# Patient Record
Sex: MALE | Race: BLACK OR AFRICAN AMERICAN | NOT HISPANIC OR LATINO | Employment: OTHER | URBAN - METROPOLITAN AREA
[De-identification: names, ages, dates, MRNs, and addresses within clinical notes are randomized per-mention and may not be internally consistent; named-entity substitution may affect disease eponyms.]

---

## 2021-09-03 ENCOUNTER — HOSPITAL ENCOUNTER (EMERGENCY)
Facility: HOSPITAL | Age: 76
Discharge: HOME | End: 2021-09-03
Attending: EMERGENCY MEDICINE
Payer: MEDICARE

## 2021-09-03 VITALS
RESPIRATION RATE: 18 BRPM | TEMPERATURE: 98 F | HEART RATE: 90 BPM | SYSTOLIC BLOOD PRESSURE: 132 MMHG | DIASTOLIC BLOOD PRESSURE: 66 MMHG | OXYGEN SATURATION: 96 %

## 2021-09-03 DIAGNOSIS — N30.00 ACUTE CYSTITIS WITHOUT HEMATURIA: Primary | ICD-10-CM

## 2021-09-03 DIAGNOSIS — J18.9 PNEUMONIA OF LEFT LOWER LOBE DUE TO INFECTIOUS ORGANISM: ICD-10-CM

## 2021-09-03 DIAGNOSIS — R00.0 TACHYCARDIA: ICD-10-CM

## 2021-09-03 DIAGNOSIS — I48.91 A-FIB: ICD-10-CM

## 2021-09-03 LAB
ALBUMIN SERPL BCP-MCNC: 4.2 G/DL (ref 3.5–5.2)
ALP SERPL-CCNC: 98 U/L (ref 38–126)
ALT SERPL W/O P-5'-P-CCNC: 11 U/L (ref 10–44)
ANION GAP SERPL CALC-SCNC: 13 MMOL/L (ref 8–16)
AST SERPL-CCNC: 49 U/L (ref 15–46)
BACTERIA #/AREA URNS AUTO: ABNORMAL /HPF
BASOPHILS # BLD AUTO: 0.05 K/UL (ref 0–0.2)
BASOPHILS NFR BLD: 0.3 % (ref 0–1.9)
BILIRUB SERPL-MCNC: 2.7 MG/DL (ref 0.1–1)
BILIRUB UR QL STRIP: ABNORMAL
CALCIUM SERPL-MCNC: 9.1 MG/DL (ref 8.7–10.5)
CHLORIDE SERPL-SCNC: 93 MMOL/L (ref 95–110)
CLARITY UR REFRACT.AUTO: ABNORMAL
CO2 SERPL-SCNC: 28 MMOL/L (ref 23–29)
COLOR UR AUTO: YELLOW
CREAT SERPL-MCNC: 1.6 MG/DL (ref 0.5–1.4)
DIFFERENTIAL METHOD: ABNORMAL
EOSINOPHIL # BLD AUTO: 0 K/UL (ref 0–0.5)
EOSINOPHIL NFR BLD: 0.1 % (ref 0–8)
ERYTHROCYTE [DISTWIDTH] IN BLOOD BY AUTOMATED COUNT: 15.4 % (ref 11.5–14.5)
EST. GFR  (AFRICAN AMERICAN): 47.7 ML/MIN/1.73 M^2
EST. GFR  (NON AFRICAN AMERICAN): 41.2 ML/MIN/1.73 M^2
GLUCOSE SERPL-MCNC: 161 MG/DL (ref 70–110)
GLUCOSE UR QL STRIP: NEGATIVE
HCT VFR BLD AUTO: 33 % (ref 40–54)
HGB BLD-MCNC: 11.1 G/DL (ref 14–18)
HGB UR QL STRIP: ABNORMAL
HYALINE CASTS UR QL AUTO: 2 /LPF
IMM GRANULOCYTES # BLD AUTO: 0.08 K/UL (ref 0–0.04)
IMM GRANULOCYTES NFR BLD AUTO: 0.5 % (ref 0–0.5)
KETONES UR QL STRIP: ABNORMAL
LEUKOCYTE ESTERASE UR QL STRIP: ABNORMAL
LYMPHOCYTES # BLD AUTO: 0.9 K/UL (ref 1–4.8)
LYMPHOCYTES NFR BLD: 5.2 % (ref 18–48)
MAGNESIUM SERPL-MCNC: 1.6 MG/DL (ref 1.6–2.6)
MCH RBC QN AUTO: 31.3 PG (ref 27–31)
MCHC RBC AUTO-ENTMCNC: 33.6 G/DL (ref 32–36)
MCV RBC AUTO: 93 FL (ref 82–98)
MICROSCOPIC COMMENT: ABNORMAL
MONOCYTES # BLD AUTO: 0.5 K/UL (ref 0.3–1)
MONOCYTES NFR BLD: 3.2 % (ref 4–15)
NEUTROPHILS # BLD AUTO: 14.9 K/UL (ref 1.8–7.7)
NEUTROPHILS NFR BLD: 90.7 % (ref 38–73)
NITRITE UR QL STRIP: NEGATIVE
NRBC BLD-RTO: 0 /100 WBC
NT-PROBNP SERPL-MCNC: 235 PG/ML (ref 5–1800)
PH UR STRIP: 5 [PH] (ref 5–8)
PLATELET # BLD AUTO: 328 K/UL (ref 150–450)
PMV BLD AUTO: 11.3 FL (ref 9.2–12.9)
POTASSIUM SERPL-SCNC: 4 MMOL/L (ref 3.5–5.1)
PROT SERPL-MCNC: 8 G/DL (ref 6–8.4)
PROT UR QL STRIP: ABNORMAL
RBC # BLD AUTO: 3.55 M/UL (ref 4.6–6.2)
RBC #/AREA URNS AUTO: 2 /HPF (ref 0–4)
SODIUM SERPL-SCNC: 134 MMOL/L (ref 136–145)
SP GR UR STRIP: 1.02 (ref 1–1.03)
TROPONIN I SERPL-MCNC: 0.03 NG/ML (ref 0.01–0.03)
URN SPEC COLLECT METH UR: ABNORMAL
UROBILINOGEN UR STRIP-ACNC: >=8 EU/DL
UUN UR-MCNC: 50 MG/DL (ref 2–20)
WBC # BLD AUTO: 16.39 K/UL (ref 3.9–12.7)
WBC #/AREA URNS AUTO: 30 /HPF (ref 0–5)
WBC CLUMPS UR QL AUTO: ABNORMAL

## 2021-09-03 PROCEDURE — 96372 THER/PROPH/DIAG INJ SC/IM: CPT | Mod: ER

## 2021-09-03 PROCEDURE — 83735 ASSAY OF MAGNESIUM: CPT | Mod: ER | Performed by: EMERGENCY MEDICINE

## 2021-09-03 PROCEDURE — 25000003 PHARM REV CODE 250: Mod: ER | Performed by: EMERGENCY MEDICINE

## 2021-09-03 PROCEDURE — 84484 ASSAY OF TROPONIN QUANT: CPT | Mod: ER | Performed by: EMERGENCY MEDICINE

## 2021-09-03 PROCEDURE — 93010 ELECTROCARDIOGRAM REPORT: CPT | Mod: 76,,, | Performed by: INTERNAL MEDICINE

## 2021-09-03 PROCEDURE — 96374 THER/PROPH/DIAG INJ IV PUSH: CPT | Mod: ER

## 2021-09-03 PROCEDURE — 63600175 PHARM REV CODE 636 W HCPCS: Mod: ER | Performed by: EMERGENCY MEDICINE

## 2021-09-03 PROCEDURE — 93005 ELECTROCARDIOGRAM TRACING: CPT | Mod: ER

## 2021-09-03 PROCEDURE — 87086 URINE CULTURE/COLONY COUNT: CPT | Mod: ER | Performed by: EMERGENCY MEDICINE

## 2021-09-03 PROCEDURE — 93010 EKG 12-LEAD: ICD-10-PCS | Mod: ,,, | Performed by: INTERNAL MEDICINE

## 2021-09-03 PROCEDURE — 99284 EMERGENCY DEPT VISIT MOD MDM: CPT | Mod: 25,ER

## 2021-09-03 PROCEDURE — 83880 ASSAY OF NATRIURETIC PEPTIDE: CPT | Mod: ER | Performed by: EMERGENCY MEDICINE

## 2021-09-03 PROCEDURE — 80053 COMPREHEN METABOLIC PANEL: CPT | Mod: ER | Performed by: EMERGENCY MEDICINE

## 2021-09-03 PROCEDURE — 81000 URINALYSIS NONAUTO W/SCOPE: CPT | Mod: ER | Performed by: EMERGENCY MEDICINE

## 2021-09-03 PROCEDURE — 85025 COMPLETE CBC W/AUTO DIFF WBC: CPT | Mod: ER | Performed by: EMERGENCY MEDICINE

## 2021-09-03 RX ORDER — METOPROLOL TARTRATE 1 MG/ML
5 INJECTION, SOLUTION INTRAVENOUS
Status: DISCONTINUED | OUTPATIENT
Start: 2021-09-03 | End: 2021-09-03

## 2021-09-03 RX ORDER — AZITHROMYCIN 250 MG/1
250 TABLET, FILM COATED ORAL DAILY
Qty: 6 TABLET | Refills: 0 | Status: SHIPPED | OUTPATIENT
Start: 2021-09-03 | End: 2021-09-21

## 2021-09-03 RX ORDER — METOPROLOL TARTRATE 1 MG/ML
5 INJECTION, SOLUTION INTRAVENOUS
Status: COMPLETED | OUTPATIENT
Start: 2021-09-03 | End: 2021-09-03

## 2021-09-03 RX ORDER — AMOXICILLIN AND CLAVULANATE POTASSIUM 875; 125 MG/1; MG/1
1 TABLET, FILM COATED ORAL 2 TIMES DAILY
Qty: 14 TABLET | Refills: 0 | Status: SHIPPED | OUTPATIENT
Start: 2021-09-03 | End: 2021-09-21

## 2021-09-03 RX ORDER — CEFTRIAXONE 1 G/1
1 INJECTION, POWDER, FOR SOLUTION INTRAMUSCULAR; INTRAVENOUS
Status: COMPLETED | OUTPATIENT
Start: 2021-09-03 | End: 2021-09-03

## 2021-09-03 RX ADMIN — CEFTRIAXONE SODIUM 1 G: 1 INJECTION, POWDER, FOR SOLUTION INTRAMUSCULAR; INTRAVENOUS at 05:09

## 2021-09-03 RX ADMIN — METOPROLOL TARTRATE 5 MG: 5 INJECTION INTRAVENOUS at 02:09

## 2021-09-05 LAB — BACTERIA UR CULT: NORMAL

## 2021-09-21 ENCOUNTER — OFFICE VISIT (OUTPATIENT)
Dept: CARDIOLOGY | Facility: CLINIC | Age: 76
End: 2021-09-21
Payer: MEDICARE

## 2021-09-21 VITALS
HEART RATE: 66 BPM | OXYGEN SATURATION: 97 % | WEIGHT: 213.88 LBS | DIASTOLIC BLOOD PRESSURE: 86 MMHG | SYSTOLIC BLOOD PRESSURE: 155 MMHG

## 2021-09-21 DIAGNOSIS — I48.91 A-FIB: ICD-10-CM

## 2021-09-21 DIAGNOSIS — I48.91 ATRIAL FIBRILLATION, UNSPECIFIED TYPE: Primary | ICD-10-CM

## 2021-09-21 DIAGNOSIS — M79.604 PAIN IN BOTH LOWER EXTREMITIES: ICD-10-CM

## 2021-09-21 DIAGNOSIS — M79.605 PAIN IN BOTH LOWER EXTREMITIES: ICD-10-CM

## 2021-09-21 DIAGNOSIS — E78.5 HYPERLIPIDEMIA, UNSPECIFIED HYPERLIPIDEMIA TYPE: ICD-10-CM

## 2021-09-21 DIAGNOSIS — I10 HYPERTENSION, UNSPECIFIED TYPE: ICD-10-CM

## 2021-09-21 PROCEDURE — 93010 ELECTROCARDIOGRAM REPORT: CPT | Mod: S$GLB,,, | Performed by: INTERNAL MEDICINE

## 2021-09-21 PROCEDURE — 99204 PR OFFICE/OUTPT VISIT, NEW, LEVL IV, 45-59 MIN: ICD-10-PCS | Mod: S$GLB,,, | Performed by: INTERNAL MEDICINE

## 2021-09-21 PROCEDURE — 93010 EKG 12-LEAD: ICD-10-PCS | Mod: S$GLB,,, | Performed by: INTERNAL MEDICINE

## 2021-09-21 PROCEDURE — 99204 OFFICE O/P NEW MOD 45 MIN: CPT | Mod: S$GLB,,, | Performed by: INTERNAL MEDICINE

## 2021-09-21 PROCEDURE — 93005 EKG 12-LEAD: ICD-10-PCS | Mod: S$GLB,,, | Performed by: INTERNAL MEDICINE

## 2021-09-21 PROCEDURE — 93005 ELECTROCARDIOGRAM TRACING: CPT | Mod: S$GLB,,, | Performed by: INTERNAL MEDICINE

## 2021-09-21 PROCEDURE — 99999 PR PBB SHADOW E&M-EST. PATIENT-LVL III: ICD-10-PCS | Mod: PBBFAC,,, | Performed by: INTERNAL MEDICINE

## 2021-09-21 PROCEDURE — 99213 OFFICE O/P EST LOW 20 MIN: CPT | Mod: PBBFAC,PO | Performed by: INTERNAL MEDICINE

## 2021-09-21 PROCEDURE — 99999 PR PBB SHADOW E&M-EST. PATIENT-LVL III: CPT | Mod: PBBFAC,,, | Performed by: INTERNAL MEDICINE

## 2021-09-21 RX ORDER — VALSARTAN 80 MG/1
TABLET ORAL DAILY
COMMUNITY
End: 2021-11-12 | Stop reason: SDUPTHER

## 2021-09-21 RX ORDER — ATORVASTATIN CALCIUM 10 MG/1
TABLET, FILM COATED ORAL DAILY
COMMUNITY
End: 2021-09-21 | Stop reason: SDUPTHER

## 2021-09-21 RX ORDER — ATORVASTATIN CALCIUM 10 MG/1
10 TABLET, FILM COATED ORAL DAILY
Qty: 30 TABLET | Refills: 11 | Status: SHIPPED | OUTPATIENT
Start: 2021-09-21 | End: 2021-11-12 | Stop reason: SDUPTHER

## 2021-09-21 RX ORDER — CARVEDILOL 12.5 MG/1
12.5 TABLET ORAL 2 TIMES DAILY
Qty: 60 TABLET | Refills: 11 | Status: SHIPPED | OUTPATIENT
Start: 2021-09-21 | End: 2021-11-12 | Stop reason: SDUPTHER

## 2021-09-21 RX ORDER — CHLORTHALIDONE 25 MG/1
TABLET ORAL DAILY
COMMUNITY
End: 2021-09-21

## 2021-09-28 ENCOUNTER — HOSPITAL ENCOUNTER (OUTPATIENT)
Dept: CARDIOLOGY | Facility: HOSPITAL | Age: 76
Discharge: HOME | End: 2021-09-28
Attending: INTERNAL MEDICINE
Payer: MEDICARE

## 2021-09-28 DIAGNOSIS — I48.91 ATRIAL FIBRILLATION, UNSPECIFIED TYPE: ICD-10-CM

## 2021-09-28 DIAGNOSIS — M79.604 PAIN IN BOTH LOWER EXTREMITIES: ICD-10-CM

## 2021-09-28 DIAGNOSIS — M79.605 PAIN IN BOTH LOWER EXTREMITIES: ICD-10-CM

## 2021-09-28 LAB
AORTIC ROOT ANNULUS: 3.39 CM
AORTIC VALVE CUSP SEPERATION: 1.88 CM
AV INDEX (PROSTH): 0.77
AV MEAN GRADIENT: 4 MMHG
AV PEAK GRADIENT: 7 MMHG
AV VALVE AREA: 2.5 CM2
AV VELOCITY RATIO: 0.74
CV ECHO LV RWT: 0.48 CM
DOP CALC AO PEAK VEL: 1.33 M/S
DOP CALC AO VTI: 26.78 CM
DOP CALC LVOT AREA: 3.3 CM2
DOP CALC LVOT DIAMETER: 2.04 CM
DOP CALC LVOT PEAK VEL: 0.99 M/S
DOP CALC LVOT STROKE VOLUME: 66.94 CM3
DOP CALC MV VTI: 20.85 CM
DOP CALCLVOT PEAK VEL VTI: 20.49 CM
E WAVE DECELERATION TIME: 212.72 MSEC
E/A RATIO: 0.7
ECHO LV POSTERIOR WALL: 1.22 CM (ref 0.6–1.1)
EJECTION FRACTION: 55 %
FRACTIONAL SHORTENING: 35 % (ref 28–44)
INTERVENTRICULAR SEPTUM: 1.33 CM (ref 0.6–1.1)
LA MAJOR: 6.09 CM
LA MINOR: 5.93 CM
LA WIDTH: 4.91 CM
LEFT ATRIUM SIZE: 4.61 CM
LEFT ATRIUM VOLUME MOD: 112.43 CM3
LEFT ATRIUM VOLUME: 115.61 CM3
LEFT INTERNAL DIMENSION IN SYSTOLE: 3.31 CM (ref 2.1–4)
LEFT VENTRICLE DIASTOLIC VOLUME: 125 ML
LEFT VENTRICLE SYSTOLIC VOLUME: 44.34 ML
LEFT VENTRICULAR INTERNAL DIMENSION IN DIASTOLE: 5.12 CM (ref 3.5–6)
LEFT VENTRICULAR MASS: 264.34 G
LV SEPTAL E/E' RATIO: 11.33 M/S
MV A" WAVE DURATION": 10.38 MSEC
MV MEAN GRADIENT: 1 MMHG
MV PEAK A VEL: 0.97 M/S
MV PEAK E VEL: 0.68 M/S
MV PEAK GRADIENT: 5 MMHG
MV STENOSIS PRESSURE HALF TIME: 61.69 MS
MV VALVE AREA BY CONTINUITY EQUATION: 3.21 CM2
MV VALVE AREA P 1/2 METHOD: 3.57 CM2
PISA MRMAX VEL: 0.06 M/S
PISA TR MAX VEL: 3.12 M/S
PULM VEIN S/D RATIO: 1.48
PV PEAK D VEL: 0.44 M/S
PV PEAK S VEL: 0.65 M/S
PV PEAK VELOCITY: 1.07 CM/S
RA MAJOR: 5.26 CM
RA PRESSURE: 3 MMHG
RA WIDTH: 4.72 CM
RIGHT VENTRICULAR END-DIASTOLIC DIMENSION: 2.88 CM
TDI SEPTAL: 0.06 M/S
TR MAX PG: 39 MMHG
TRICUSPID ANNULAR PLANE SYSTOLIC EXCURSION: 2.18 CM
TV REST PULMONARY ARTERY PRESSURE: 42 MMHG

## 2021-09-28 PROCEDURE — 93306 TTE W/DOPPLER COMPLETE: CPT | Mod: 26,,, | Performed by: INTERNAL MEDICINE

## 2021-09-28 PROCEDURE — 93306 ECHO (CUPID ONLY): ICD-10-PCS | Mod: 26,,, | Performed by: INTERNAL MEDICINE

## 2021-09-28 PROCEDURE — 93922 ANKLE BRACHIAL INDICES (ABI): ICD-10-PCS | Mod: 26,,, | Performed by: INTERNAL MEDICINE

## 2021-09-28 PROCEDURE — 93922 UPR/L XTREMITY ART 2 LEVELS: CPT | Mod: 26,,, | Performed by: INTERNAL MEDICINE

## 2021-09-28 PROCEDURE — 93306 TTE W/DOPPLER COMPLETE: CPT | Mod: PO

## 2021-09-28 PROCEDURE — 93922 UPR/L XTREMITY ART 2 LEVELS: CPT | Mod: PO

## 2021-09-29 ENCOUNTER — TELEPHONE (OUTPATIENT)
Dept: CARDIOLOGY | Facility: CLINIC | Age: 76
End: 2021-09-29

## 2021-09-30 ENCOUNTER — LAB VISIT (OUTPATIENT)
Dept: LAB | Facility: HOSPITAL | Age: 76
End: 2021-09-30
Attending: EMERGENCY MEDICINE
Payer: MEDICARE

## 2021-09-30 ENCOUNTER — TELEPHONE (OUTPATIENT)
Dept: FAMILY MEDICINE | Facility: CLINIC | Age: 76
End: 2021-09-30

## 2021-09-30 ENCOUNTER — OFFICE VISIT (OUTPATIENT)
Dept: CARDIOLOGY | Facility: CLINIC | Age: 76
End: 2021-09-30
Payer: MEDICARE

## 2021-09-30 VITALS
SYSTOLIC BLOOD PRESSURE: 172 MMHG | DIASTOLIC BLOOD PRESSURE: 93 MMHG | WEIGHT: 222 LBS | OXYGEN SATURATION: 96 % | HEART RATE: 84 BPM

## 2021-09-30 DIAGNOSIS — I10 HYPERTENSION, UNSPECIFIED TYPE: ICD-10-CM

## 2021-09-30 DIAGNOSIS — E78.5 HYPERLIPIDEMIA, UNSPECIFIED HYPERLIPIDEMIA TYPE: ICD-10-CM

## 2021-09-30 DIAGNOSIS — I48.91 ATRIAL FIBRILLATION, UNSPECIFIED TYPE: ICD-10-CM

## 2021-09-30 DIAGNOSIS — R29.818 SUSPECTED SLEEP APNEA: Primary | ICD-10-CM

## 2021-09-30 LAB
CHOLEST SERPL-MCNC: 131 MG/DL (ref 120–199)
CHOLEST/HDLC SERPL: 3.9 {RATIO} (ref 2–5)
CREAT SERPL-MCNC: 0.9 MG/DL (ref 0.5–1.4)
EST. GFR  (AFRICAN AMERICAN): >60 ML/MIN/1.73 M^2
EST. GFR  (NON AFRICAN AMERICAN): >60 ML/MIN/1.73 M^2
HDLC SERPL-MCNC: 34 MG/DL (ref 40–75)
HDLC SERPL: 26 % (ref 20–50)
LDLC SERPL CALC-MCNC: 71.8 MG/DL (ref 63–159)
NONHDLC SERPL-MCNC: 97 MG/DL
POTASSIUM SERPL-SCNC: 3.2 MMOL/L (ref 3.5–5.1)
SODIUM SERPL-SCNC: 144 MMOL/L (ref 136–145)
TRIGL SERPL-MCNC: 126 MG/DL (ref 30–150)

## 2021-09-30 PROCEDURE — 84132 ASSAY OF SERUM POTASSIUM: CPT | Performed by: INTERNAL MEDICINE

## 2021-09-30 PROCEDURE — 99212 PR OFFICE/OUTPT VISIT, EST, LEVL II, 10-19 MIN: ICD-10-PCS | Mod: S$GLB,,, | Performed by: INTERNAL MEDICINE

## 2021-09-30 PROCEDURE — 84295 ASSAY OF SERUM SODIUM: CPT | Performed by: INTERNAL MEDICINE

## 2021-09-30 PROCEDURE — 1126F PR PAIN SEVERITY QUANTIFIED, NO PAIN PRESENT: ICD-10-PCS | Mod: CPTII,S$GLB,, | Performed by: INTERNAL MEDICINE

## 2021-09-30 PROCEDURE — 3080F DIAST BP >= 90 MM HG: CPT | Mod: CPTII,S$GLB,, | Performed by: INTERNAL MEDICINE

## 2021-09-30 PROCEDURE — 1160F RVW MEDS BY RX/DR IN RCRD: CPT | Mod: CPTII,S$GLB,, | Performed by: INTERNAL MEDICINE

## 2021-09-30 PROCEDURE — 1160F PR REVIEW ALL MEDS BY PRESCRIBER/CLIN PHARMACIST DOCUMENTED: ICD-10-PCS | Mod: CPTII,S$GLB,, | Performed by: INTERNAL MEDICINE

## 2021-09-30 PROCEDURE — 1126F AMNT PAIN NOTED NONE PRSNT: CPT | Mod: CPTII,S$GLB,, | Performed by: INTERNAL MEDICINE

## 2021-09-30 PROCEDURE — 99212 OFFICE O/P EST SF 10 MIN: CPT | Mod: S$GLB,,, | Performed by: INTERNAL MEDICINE

## 2021-09-30 PROCEDURE — 80061 LIPID PANEL: CPT | Performed by: INTERNAL MEDICINE

## 2021-09-30 PROCEDURE — 82565 ASSAY OF CREATININE: CPT | Performed by: INTERNAL MEDICINE

## 2021-09-30 PROCEDURE — 3080F PR MOST RECENT DIASTOLIC BLOOD PRESSURE >= 90 MM HG: ICD-10-PCS | Mod: CPTII,S$GLB,, | Performed by: INTERNAL MEDICINE

## 2021-09-30 PROCEDURE — 3077F SYST BP >= 140 MM HG: CPT | Mod: CPTII,S$GLB,, | Performed by: INTERNAL MEDICINE

## 2021-09-30 PROCEDURE — 1159F PR MEDICATION LIST DOCUMENTED IN MEDICAL RECORD: ICD-10-PCS | Mod: CPTII,S$GLB,, | Performed by: INTERNAL MEDICINE

## 2021-09-30 PROCEDURE — 36415 COLL VENOUS BLD VENIPUNCTURE: CPT | Performed by: INTERNAL MEDICINE

## 2021-09-30 PROCEDURE — 99999 PR PBB SHADOW E&M-EST. PATIENT-LVL III: CPT | Mod: PBBFAC,,, | Performed by: INTERNAL MEDICINE

## 2021-09-30 PROCEDURE — 3077F PR MOST RECENT SYSTOLIC BLOOD PRESSURE >= 140 MM HG: ICD-10-PCS | Mod: CPTII,S$GLB,, | Performed by: INTERNAL MEDICINE

## 2021-09-30 PROCEDURE — 99999 PR PBB SHADOW E&M-EST. PATIENT-LVL III: ICD-10-PCS | Mod: PBBFAC,,, | Performed by: INTERNAL MEDICINE

## 2021-09-30 PROCEDURE — 1159F MED LIST DOCD IN RCRD: CPT | Mod: CPTII,S$GLB,, | Performed by: INTERNAL MEDICINE

## 2021-09-30 RX ORDER — HYDROCHLOROTHIAZIDE 12.5 MG/1
12.5 TABLET ORAL DAILY
Qty: 30 TABLET | Refills: 11 | Status: SHIPPED | OUTPATIENT
Start: 2021-09-30 | End: 2021-10-21 | Stop reason: SDUPTHER

## 2021-10-01 LAB
LEFT ABI: 1.24
LEFT ARM BP: 157 MMHG
LEFT DORSALIS PEDIS: 184 MMHG
LEFT POSTERIOR TIBIAL: 194 MMHG
LEFT TBI: 0.55
LEFT TOE PRESSURE: 86 MMHG
RIGHT ABI: 1.27
RIGHT ARM BP: 155 MMHG
RIGHT DORSALIS PEDIS: 177 MMHG
RIGHT POSTERIOR TIBIAL: 200 MMHG
RIGHT TBI: 0.64
RIGHT TOE PRESSURE: 100 MMHG

## 2021-10-05 ENCOUNTER — TELEPHONE (OUTPATIENT)
Dept: CARDIOLOGY | Facility: CLINIC | Age: 76
End: 2021-10-05

## 2021-10-05 DIAGNOSIS — I10 HYPERTENSION, UNSPECIFIED TYPE: Primary | ICD-10-CM

## 2021-10-09 ENCOUNTER — TELEPHONE (OUTPATIENT)
Dept: CARDIOLOGY | Facility: CLINIC | Age: 76
End: 2021-10-09

## 2021-10-11 ENCOUNTER — TELEPHONE (OUTPATIENT)
Dept: CARDIOLOGY | Facility: CLINIC | Age: 76
End: 2021-10-11

## 2021-10-15 ENCOUNTER — TELEPHONE (OUTPATIENT)
Dept: CARDIOLOGY | Facility: CLINIC | Age: 76
End: 2021-10-15

## 2021-10-21 ENCOUNTER — OFFICE VISIT (OUTPATIENT)
Dept: CARDIOLOGY | Facility: CLINIC | Age: 76
End: 2021-10-21
Payer: MEDICARE

## 2021-10-21 ENCOUNTER — LAB VISIT (OUTPATIENT)
Dept: LAB | Facility: HOSPITAL | Age: 76
End: 2021-10-21
Attending: INTERNAL MEDICINE
Payer: MEDICARE

## 2021-10-21 VITALS
BODY MASS INDEX: 30.24 KG/M2 | WEIGHT: 216 LBS | SYSTOLIC BLOOD PRESSURE: 169 MMHG | HEIGHT: 71 IN | HEART RATE: 89 BPM | DIASTOLIC BLOOD PRESSURE: 104 MMHG

## 2021-10-21 DIAGNOSIS — I47.10 PSVT (PAROXYSMAL SUPRAVENTRICULAR TACHYCARDIA): ICD-10-CM

## 2021-10-21 DIAGNOSIS — E78.00 PURE HYPERCHOLESTEROLEMIA: ICD-10-CM

## 2021-10-21 DIAGNOSIS — I10 PRIMARY HYPERTENSION: ICD-10-CM

## 2021-10-21 DIAGNOSIS — I48.0 PAF (PAROXYSMAL ATRIAL FIBRILLATION): ICD-10-CM

## 2021-10-21 DIAGNOSIS — I48.91 ATRIAL FIBRILLATION, UNSPECIFIED TYPE: ICD-10-CM

## 2021-10-21 DIAGNOSIS — I10 HYPERTENSION, UNSPECIFIED TYPE: ICD-10-CM

## 2021-10-21 PROBLEM — E78.5 HLD (HYPERLIPIDEMIA): Status: ACTIVE | Noted: 2021-10-21

## 2021-10-21 LAB
POTASSIUM SERPL-SCNC: 3.7 MMOL/L (ref 3.5–5.1)
SODIUM SERPL-SCNC: 141 MMOL/L (ref 136–145)

## 2021-10-21 PROCEDURE — 93005 RHYTHM STRIP: ICD-10-PCS | Mod: S$GLB,,, | Performed by: INTERNAL MEDICINE

## 2021-10-21 PROCEDURE — 3080F DIAST BP >= 90 MM HG: CPT | Mod: CPTII,S$GLB,, | Performed by: INTERNAL MEDICINE

## 2021-10-21 PROCEDURE — 3288F FALL RISK ASSESSMENT DOCD: CPT | Mod: CPTII,S$GLB,, | Performed by: INTERNAL MEDICINE

## 2021-10-21 PROCEDURE — 93010 RHYTHM STRIP: ICD-10-PCS | Mod: S$GLB,,, | Performed by: INTERNAL MEDICINE

## 2021-10-21 PROCEDURE — 93010 ELECTROCARDIOGRAM REPORT: CPT | Mod: S$GLB,,, | Performed by: INTERNAL MEDICINE

## 2021-10-21 PROCEDURE — 1159F MED LIST DOCD IN RCRD: CPT | Mod: CPTII,S$GLB,, | Performed by: INTERNAL MEDICINE

## 2021-10-21 PROCEDURE — 99205 OFFICE O/P NEW HI 60 MIN: CPT | Mod: S$GLB,,, | Performed by: INTERNAL MEDICINE

## 2021-10-21 PROCEDURE — 99999 PR PBB SHADOW E&M-EST. PATIENT-LVL III: CPT | Mod: PBBFAC,,, | Performed by: INTERNAL MEDICINE

## 2021-10-21 PROCEDURE — 1160F RVW MEDS BY RX/DR IN RCRD: CPT | Mod: CPTII,S$GLB,, | Performed by: INTERNAL MEDICINE

## 2021-10-21 PROCEDURE — 1100F PR PT FALLS ASSESS DOC 2+ FALLS/FALL W/INJURY/YR: ICD-10-PCS | Mod: CPTII,S$GLB,, | Performed by: INTERNAL MEDICINE

## 2021-10-21 PROCEDURE — 3077F SYST BP >= 140 MM HG: CPT | Mod: CPTII,S$GLB,, | Performed by: INTERNAL MEDICINE

## 2021-10-21 PROCEDURE — 1159F PR MEDICATION LIST DOCUMENTED IN MEDICAL RECORD: ICD-10-PCS | Mod: CPTII,S$GLB,, | Performed by: INTERNAL MEDICINE

## 2021-10-21 PROCEDURE — 99205 PR OFFICE/OUTPT VISIT, NEW, LEVL V, 60-74 MIN: ICD-10-PCS | Mod: S$GLB,,, | Performed by: INTERNAL MEDICINE

## 2021-10-21 PROCEDURE — 36415 COLL VENOUS BLD VENIPUNCTURE: CPT | Performed by: INTERNAL MEDICINE

## 2021-10-21 PROCEDURE — 1160F PR REVIEW ALL MEDS BY PRESCRIBER/CLIN PHARMACIST DOCUMENTED: ICD-10-PCS | Mod: CPTII,S$GLB,, | Performed by: INTERNAL MEDICINE

## 2021-10-21 PROCEDURE — 1126F PR PAIN SEVERITY QUANTIFIED, NO PAIN PRESENT: ICD-10-PCS | Mod: CPTII,S$GLB,, | Performed by: INTERNAL MEDICINE

## 2021-10-21 PROCEDURE — 84295 ASSAY OF SERUM SODIUM: CPT | Performed by: INTERNAL MEDICINE

## 2021-10-21 PROCEDURE — 3077F PR MOST RECENT SYSTOLIC BLOOD PRESSURE >= 140 MM HG: ICD-10-PCS | Mod: CPTII,S$GLB,, | Performed by: INTERNAL MEDICINE

## 2021-10-21 PROCEDURE — 93005 ELECTROCARDIOGRAM TRACING: CPT | Mod: S$GLB,,, | Performed by: INTERNAL MEDICINE

## 2021-10-21 PROCEDURE — 1126F AMNT PAIN NOTED NONE PRSNT: CPT | Mod: CPTII,S$GLB,, | Performed by: INTERNAL MEDICINE

## 2021-10-21 PROCEDURE — 3288F PR FALLS RISK ASSESSMENT DOCUMENTED: ICD-10-PCS | Mod: CPTII,S$GLB,, | Performed by: INTERNAL MEDICINE

## 2021-10-21 PROCEDURE — 1100F PTFALLS ASSESS-DOCD GE2>/YR: CPT | Mod: CPTII,S$GLB,, | Performed by: INTERNAL MEDICINE

## 2021-10-21 PROCEDURE — 84132 ASSAY OF SERUM POTASSIUM: CPT | Performed by: INTERNAL MEDICINE

## 2021-10-21 PROCEDURE — 3080F PR MOST RECENT DIASTOLIC BLOOD PRESSURE >= 90 MM HG: ICD-10-PCS | Mod: CPTII,S$GLB,, | Performed by: INTERNAL MEDICINE

## 2021-10-21 PROCEDURE — 99999 PR PBB SHADOW E&M-EST. PATIENT-LVL III: ICD-10-PCS | Mod: PBBFAC,,, | Performed by: INTERNAL MEDICINE

## 2021-10-21 RX ORDER — HYDROCHLOROTHIAZIDE 25 MG/1
25 TABLET ORAL DAILY
Qty: 90 TABLET | Refills: 3 | Status: SHIPPED | OUTPATIENT
Start: 2021-10-21 | End: 2021-11-12 | Stop reason: SDUPTHER

## 2021-10-22 ENCOUNTER — TELEPHONE (OUTPATIENT)
Dept: CARDIOLOGY | Facility: CLINIC | Age: 76
End: 2021-10-22

## 2021-10-22 DIAGNOSIS — I48.91 ATRIAL FIBRILLATION, UNSPECIFIED TYPE: Primary | ICD-10-CM

## 2021-11-12 ENCOUNTER — TELEPHONE (OUTPATIENT)
Dept: ELECTROPHYSIOLOGY | Facility: CLINIC | Age: 76
End: 2021-11-12
Payer: MEDICARE

## 2021-11-12 RX ORDER — ATORVASTATIN CALCIUM 10 MG/1
10 TABLET, FILM COATED ORAL DAILY
Qty: 90 TABLET | Refills: 3 | Status: SHIPPED | OUTPATIENT
Start: 2021-11-12 | End: 2023-05-26

## 2021-11-12 RX ORDER — VALSARTAN 40 MG/1
40 TABLET ORAL DAILY
Qty: 30 TABLET | Refills: 0 | Status: SHIPPED | OUTPATIENT
Start: 2021-11-12 | End: 2021-12-20

## 2021-11-12 RX ORDER — CARVEDILOL 12.5 MG/1
12.5 TABLET ORAL 2 TIMES DAILY
Qty: 180 TABLET | Refills: 3 | Status: SHIPPED | OUTPATIENT
Start: 2021-11-12 | End: 2023-05-26

## 2021-11-12 RX ORDER — HYDROCHLOROTHIAZIDE 25 MG/1
25 TABLET ORAL DAILY
Qty: 90 TABLET | Refills: 3 | Status: SHIPPED | OUTPATIENT
Start: 2021-11-12 | End: 2022-11-12

## 2022-01-13 ENCOUNTER — TELEPHONE (OUTPATIENT)
Dept: CARDIOLOGY | Facility: CLINIC | Age: 77
End: 2022-01-13
Payer: MEDICARE

## 2022-01-13 NOTE — TELEPHONE ENCOUNTER
Called pt to follow up in regards to verifying dosage of Rx valsartan    Pt did not answer but left a detailed voice message as well asa  Call back number    ND

## 2023-05-26 ENCOUNTER — HOSPITAL ENCOUNTER (INPATIENT)
Facility: HOSPITAL | Age: 78
LOS: 3 days | Discharge: HOME HEALTH | DRG: 896 | End: 2023-05-29
Attending: EMERGENCY MEDICINE | Admitting: INTERNAL MEDICINE
Payer: MEDICARE

## 2023-05-26 DIAGNOSIS — R41.82 ALTERED MENTAL STATUS, UNSPECIFIED ALTERED MENTAL STATUS TYPE: ICD-10-CM

## 2023-05-26 DIAGNOSIS — R06.02 SHORTNESS OF BREATH: ICD-10-CM

## 2023-05-26 DIAGNOSIS — E87.6 HYPOKALEMIA: ICD-10-CM

## 2023-05-26 DIAGNOSIS — N39.0 URINARY TRACT INFECTION WITHOUT HEMATURIA, SITE UNSPECIFIED: ICD-10-CM

## 2023-05-26 DIAGNOSIS — I48.0 PAF (PAROXYSMAL ATRIAL FIBRILLATION): ICD-10-CM

## 2023-05-26 DIAGNOSIS — I47.10 PSVT (PAROXYSMAL SUPRAVENTRICULAR TACHYCARDIA): ICD-10-CM

## 2023-05-26 DIAGNOSIS — F10.20 ALCOHOL USE DISORDER, SEVERE, DEPENDENCE: ICD-10-CM

## 2023-05-26 DIAGNOSIS — G40.909 SEIZURE DISORDER: Primary | ICD-10-CM

## 2023-05-26 LAB
ALBUMIN SERPL BCP-MCNC: 3.2 G/DL (ref 3.5–5.2)
ALLENS TEST: ABNORMAL
ALP SERPL-CCNC: 155 U/L (ref 55–135)
ALT SERPL W/O P-5'-P-CCNC: <5 U/L (ref 10–44)
AMPHET+METHAMPHET UR QL: NEGATIVE
ANION GAP SERPL CALC-SCNC: 33 MMOL/L (ref 8–16)
APAP SERPL-MCNC: <3 UG/ML (ref 10–20)
AST SERPL-CCNC: 24 U/L (ref 10–40)
B-OH-BUTYR BLD STRIP-SCNC: 0.1 MMOL/L (ref 0–0.5)
BACTERIA #/AREA URNS HPF: ABNORMAL /HPF
BARBITURATES UR QL SCN>200 NG/ML: NEGATIVE
BASOPHILS NFR BLD: 0 % (ref 0–1.9)
BENZODIAZ UR QL SCN>200 NG/ML: ABNORMAL
BILIRUB SERPL-MCNC: 1.8 MG/DL (ref 0.1–1)
BILIRUB UR QL STRIP: NEGATIVE
BNP SERPL-MCNC: 101 PG/ML (ref 0–99)
BUN SERPL-MCNC: 13 MG/DL (ref 8–23)
BZE UR QL SCN: NEGATIVE
CALCIUM SERPL-MCNC: 7.9 MG/DL (ref 8.7–10.5)
CANNABINOIDS UR QL SCN: NEGATIVE
CHLORIDE SERPL-SCNC: 97 MMOL/L (ref 95–110)
CLARITY UR: ABNORMAL
CO2 SERPL-SCNC: 11 MMOL/L (ref 23–29)
COLOR UR: YELLOW
CREAT SERPL-MCNC: 1.5 MG/DL (ref 0.5–1.4)
CREAT UR-MCNC: 170 MG/DL (ref 23–375)
CTP QC/QA: YES
DELSYS: ABNORMAL
DIFFERENTIAL METHOD: ABNORMAL
EOSINOPHIL NFR BLD: 1 % (ref 0–8)
ERYTHROCYTE [DISTWIDTH] IN BLOOD BY AUTOMATED COUNT: 14.1 % (ref 11.5–14.5)
EST. GFR  (NO RACE VARIABLE): 47 ML/MIN/1.73 M^2
ETHANOL SERPL-MCNC: <10 MG/DL
FIO2: 50 %
GLUCOSE SERPL-MCNC: 428 MG/DL (ref 70–110)
GLUCOSE UR QL STRIP: ABNORMAL
HCO3 UR-SCNC: 29.1 MMOL/L (ref 24–28)
HCT VFR BLD AUTO: 48 % (ref 40–54)
HGB BLD-MCNC: 15 G/DL (ref 14–18)
HGB UR QL STRIP: ABNORMAL
HYALINE CASTS #/AREA URNS LPF: 4 /LPF
IMM GRANULOCYTES # BLD AUTO: ABNORMAL K/UL (ref 0–0.04)
IMM GRANULOCYTES NFR BLD AUTO: ABNORMAL % (ref 0–0.5)
KETONES UR QL STRIP: ABNORMAL
LACTATE SERPL-SCNC: 4.4 MMOL/L (ref 0.5–2.2)
LACTATE SERPL-SCNC: 8.5 MMOL/L (ref 0.5–2.2)
LEUKOCYTE ESTERASE UR QL STRIP: ABNORMAL
LIPASE SERPL-CCNC: 24 U/L (ref 4–60)
LPM: 15
LYMPHOCYTES NFR BLD: 14 % (ref 18–48)
MCH RBC QN AUTO: 33.5 PG (ref 27–31)
MCHC RBC AUTO-ENTMCNC: 31.3 G/DL (ref 32–36)
MCV RBC AUTO: 107 FL (ref 82–98)
METHADONE UR QL SCN>300 NG/ML: NEGATIVE
MICROSCOPIC COMMENT: ABNORMAL
MONOCYTES NFR BLD: 2 % (ref 4–15)
NEUTROPHILS NFR BLD: 83 % (ref 38–73)
NITRITE UR QL STRIP: NEGATIVE
NRBC BLD-RTO: 1 /100 WBC
OPIATES UR QL SCN: NEGATIVE
PCO2 BLDA: 40.1 MMHG (ref 35–45)
PCO2 BLDA: 54 MMHG (ref 35–45)
PCP UR QL SCN>25 NG/ML: NEGATIVE
PH SMN: 7.02 [PH] (ref 7.35–7.45)
PH SMN: 7.47 [PH] (ref 7.35–7.45)
PH UR STRIP: 6 [PH] (ref 5–8)
PLATELET # BLD AUTO: 251 K/UL (ref 150–450)
PLATELET BLD QL SMEAR: ABNORMAL
PMV BLD AUTO: 11.4 FL (ref 9.2–12.9)
PO2 BLDA: 124 MMHG (ref 80–100)
PO2 BLDA: 61.6 MMHG (ref 40–60)
POC BASE DEFICIT: -17.3 MMOL/L (ref -2–2)
POC BE: 5 MMOL/L
POC HCO3: 14 MMOL/L (ref 24–28)
POC PERFORMED BY: ABNORMAL
POC SATURATED O2: 74.7 % (ref 95–100)
POC SATURATED O2: 99 % (ref 95–100)
POC TCO2: 30 MMOL/L (ref 23–27)
POCT GLUCOSE: 119 MG/DL (ref 70–110)
POCT GLUCOSE: 399 MG/DL (ref 70–110)
POTASSIUM SERPL-SCNC: 2.9 MMOL/L (ref 3.5–5.1)
PROCALCITONIN SERPL IA-MCNC: 0.26 NG/ML
PROT SERPL-MCNC: 6.7 G/DL (ref 6–8.4)
PROT UR QL STRIP: ABNORMAL
RBC # BLD AUTO: 4.48 M/UL (ref 4.6–6.2)
RBC #/AREA URNS HPF: 4 /HPF (ref 0–4)
SALICYLATES SERPL-MCNC: <5 MG/DL (ref 15–30)
SAMPLE: ABNORMAL
SARS-COV-2 RDRP RESP QL NAA+PROBE: NEGATIVE
SITE: ABNORMAL
SODIUM SERPL-SCNC: 141 MMOL/L (ref 136–145)
SP GR UR STRIP: 1.02 (ref 1–1.03)
SPECIMEN SOURCE: ABNORMAL
SQUAMOUS #/AREA URNS HPF: 1 /HPF
TOXICOLOGY INFORMATION: ABNORMAL
TROPONIN I SERPL DL<=0.01 NG/ML-MCNC: <0.006 NG/ML (ref 0–0.03)
URN SPEC COLLECT METH UR: ABNORMAL
UROBILINOGEN UR STRIP-ACNC: ABNORMAL EU/DL
WBC # BLD AUTO: 26.26 K/UL (ref 3.9–12.7)
WBC #/AREA URNS HPF: 24 /HPF (ref 0–5)
YEAST URNS QL MICRO: ABNORMAL

## 2023-05-26 PROCEDURE — 20000000 HC ICU ROOM

## 2023-05-26 PROCEDURE — 84425 ASSAY OF VITAMIN B-1: CPT

## 2023-05-26 PROCEDURE — 25000003 PHARM REV CODE 250

## 2023-05-26 PROCEDURE — 93010 ELECTROCARDIOGRAM REPORT: CPT | Mod: ,,, | Performed by: INTERNAL MEDICINE

## 2023-05-26 PROCEDURE — 93005 ELECTROCARDIOGRAM TRACING: CPT

## 2023-05-26 PROCEDURE — 82010 KETONE BODYS QUAN: CPT

## 2023-05-26 PROCEDURE — 27100171 HC OXYGEN HIGH FLOW UP TO 24 HOURS

## 2023-05-26 PROCEDURE — 99285 EMERGENCY DEPT VISIT HI MDM: CPT | Mod: 25

## 2023-05-26 PROCEDURE — 80143 DRUG ASSAY ACETAMINOPHEN: CPT | Performed by: EMERGENCY MEDICINE

## 2023-05-26 PROCEDURE — 82803 BLOOD GASES ANY COMBINATION: CPT

## 2023-05-26 PROCEDURE — 96376 TX/PRO/DX INJ SAME DRUG ADON: CPT

## 2023-05-26 PROCEDURE — 99900035 HC TECH TIME PER 15 MIN (STAT)

## 2023-05-26 PROCEDURE — 83605 ASSAY OF LACTIC ACID: CPT | Mod: 91 | Performed by: EMERGENCY MEDICINE

## 2023-05-26 PROCEDURE — 36415 COLL VENOUS BLD VENIPUNCTURE: CPT

## 2023-05-26 PROCEDURE — 81000 URINALYSIS NONAUTO W/SCOPE: CPT | Performed by: EMERGENCY MEDICINE

## 2023-05-26 PROCEDURE — 83690 ASSAY OF LIPASE: CPT | Performed by: EMERGENCY MEDICINE

## 2023-05-26 PROCEDURE — 80179 DRUG ASSAY SALICYLATE: CPT | Performed by: EMERGENCY MEDICINE

## 2023-05-26 PROCEDURE — 25000003 PHARM REV CODE 250: Performed by: EMERGENCY MEDICINE

## 2023-05-26 PROCEDURE — 96361 HYDRATE IV INFUSION ADD-ON: CPT

## 2023-05-26 PROCEDURE — 36600 WITHDRAWAL OF ARTERIAL BLOOD: CPT

## 2023-05-26 PROCEDURE — 63600175 PHARM REV CODE 636 W HCPCS

## 2023-05-26 PROCEDURE — 96365 THER/PROPH/DIAG IV INF INIT: CPT

## 2023-05-26 PROCEDURE — 80307 DRUG TEST PRSMV CHEM ANLYZR: CPT | Performed by: EMERGENCY MEDICINE

## 2023-05-26 PROCEDURE — 87086 URINE CULTURE/COLONY COUNT: CPT | Performed by: EMERGENCY MEDICINE

## 2023-05-26 PROCEDURE — 87150 DNA/RNA AMPLIFIED PROBE: CPT | Mod: 59 | Performed by: EMERGENCY MEDICINE

## 2023-05-26 PROCEDURE — P9612 CATHETERIZE FOR URINE SPEC: HCPCS

## 2023-05-26 PROCEDURE — 93010 EKG 12-LEAD: ICD-10-PCS | Mod: ,,, | Performed by: INTERNAL MEDICINE

## 2023-05-26 PROCEDURE — 87040 BLOOD CULTURE FOR BACTERIA: CPT | Mod: 59 | Performed by: EMERGENCY MEDICINE

## 2023-05-26 PROCEDURE — 63600175 PHARM REV CODE 636 W HCPCS: Performed by: EMERGENCY MEDICINE

## 2023-05-26 PROCEDURE — 82962 GLUCOSE BLOOD TEST: CPT

## 2023-05-26 PROCEDURE — 84484 ASSAY OF TROPONIN QUANT: CPT | Performed by: EMERGENCY MEDICINE

## 2023-05-26 PROCEDURE — 96375 TX/PRO/DX INJ NEW DRUG ADDON: CPT

## 2023-05-26 PROCEDURE — 85007 BL SMEAR W/DIFF WBC COUNT: CPT | Performed by: EMERGENCY MEDICINE

## 2023-05-26 PROCEDURE — 80053 COMPREHEN METABOLIC PANEL: CPT | Performed by: EMERGENCY MEDICINE

## 2023-05-26 PROCEDURE — 85027 COMPLETE CBC AUTOMATED: CPT | Performed by: EMERGENCY MEDICINE

## 2023-05-26 PROCEDURE — 27000190 HC CPAP FULL FACE MASK W/VALVE

## 2023-05-26 PROCEDURE — 94760 N-INVAS EAR/PLS OXIMETRY 1: CPT

## 2023-05-26 PROCEDURE — 83880 ASSAY OF NATRIURETIC PEPTIDE: CPT | Performed by: EMERGENCY MEDICINE

## 2023-05-26 PROCEDURE — 82077 ASSAY SPEC XCP UR&BREATH IA: CPT | Performed by: EMERGENCY MEDICINE

## 2023-05-26 PROCEDURE — 25000003 PHARM REV CODE 250: Performed by: INTERNAL MEDICINE

## 2023-05-26 PROCEDURE — 84145 PROCALCITONIN (PCT): CPT | Performed by: EMERGENCY MEDICINE

## 2023-05-26 PROCEDURE — 94761 N-INVAS EAR/PLS OXIMETRY MLT: CPT

## 2023-05-26 RX ORDER — LABETALOL HYDROCHLORIDE 5 MG/ML
20 INJECTION, SOLUTION INTRAVENOUS
Status: COMPLETED | OUTPATIENT
Start: 2023-05-26 | End: 2023-05-26

## 2023-05-26 RX ORDER — MUPIROCIN 20 MG/G
OINTMENT TOPICAL 2 TIMES DAILY
Status: DISCONTINUED | OUTPATIENT
Start: 2023-05-26 | End: 2023-05-29 | Stop reason: HOSPADM

## 2023-05-26 RX ORDER — DIAZEPAM 10 MG/2ML
10 INJECTION INTRAMUSCULAR EVERY 6 HOURS PRN
Status: DISCONTINUED | OUTPATIENT
Start: 2023-05-26 | End: 2023-05-29 | Stop reason: HOSPADM

## 2023-05-26 RX ORDER — ENOXAPARIN SODIUM 100 MG/ML
1 INJECTION SUBCUTANEOUS EVERY 12 HOURS
Status: DISCONTINUED | OUTPATIENT
Start: 2023-05-26 | End: 2023-05-29 | Stop reason: HOSPADM

## 2023-05-26 RX ORDER — LORAZEPAM 2 MG/ML
2 INJECTION INTRAMUSCULAR EVERY 4 HOURS PRN
Status: DISCONTINUED | OUTPATIENT
Start: 2023-05-26 | End: 2023-05-29 | Stop reason: HOSPADM

## 2023-05-26 RX ORDER — TALC
6 POWDER (GRAM) TOPICAL NIGHTLY PRN
Status: DISCONTINUED | OUTPATIENT
Start: 2023-05-26 | End: 2023-05-29 | Stop reason: HOSPADM

## 2023-05-26 RX ORDER — POTASSIUM CHLORIDE 7.45 MG/ML
10 INJECTION INTRAVENOUS
Status: COMPLETED | OUTPATIENT
Start: 2023-05-26 | End: 2023-05-26

## 2023-05-26 RX ORDER — SODIUM CHLORIDE 0.9 % (FLUSH) 0.9 %
10 SYRINGE (ML) INJECTION
Status: DISCONTINUED | OUTPATIENT
Start: 2023-05-26 | End: 2023-05-29 | Stop reason: HOSPADM

## 2023-05-26 RX ORDER — POTASSIUM CHLORIDE 7.45 MG/ML
10 INJECTION INTRAVENOUS
Status: COMPLETED | OUTPATIENT
Start: 2023-05-27 | End: 2023-05-27

## 2023-05-26 RX ORDER — POTASSIUM CHLORIDE 14.9 MG/ML
20 INJECTION INTRAVENOUS
Status: DISCONTINUED | OUTPATIENT
Start: 2023-05-26 | End: 2023-05-26

## 2023-05-26 RX ADMIN — POTASSIUM CHLORIDE 10 MEQ: 7.46 INJECTION, SOLUTION INTRAVENOUS at 05:05

## 2023-05-26 RX ADMIN — SODIUM CHLORIDE, POTASSIUM CHLORIDE, SODIUM LACTATE AND CALCIUM CHLORIDE 1000 ML: 600; 310; 30; 20 INJECTION, SOLUTION INTRAVENOUS at 05:05

## 2023-05-26 RX ADMIN — AZITHROMYCIN MONOHYDRATE 500 MG: 500 INJECTION, POWDER, LYOPHILIZED, FOR SOLUTION INTRAVENOUS at 10:05

## 2023-05-26 RX ADMIN — CEFTRIAXONE SODIUM 2 G: 2 INJECTION, POWDER, FOR SOLUTION INTRAMUSCULAR; INTRAVENOUS at 05:05

## 2023-05-26 RX ADMIN — LABETALOL HYDROCHLORIDE 20 MG: 5 INJECTION INTRAVENOUS at 05:05

## 2023-05-26 RX ADMIN — ENOXAPARIN SODIUM 100 MG: 100 INJECTION SUBCUTANEOUS at 10:05

## 2023-05-26 RX ADMIN — MUPIROCIN: 20 OINTMENT TOPICAL at 10:05

## 2023-05-26 RX ADMIN — POTASSIUM CHLORIDE 10 MEQ: 7.46 INJECTION, SOLUTION INTRAVENOUS at 06:05

## 2023-05-26 RX ADMIN — FOLIC ACID: 5 INJECTION, SOLUTION INTRAMUSCULAR; INTRAVENOUS; SUBCUTANEOUS at 10:05

## 2023-05-26 NOTE — ED PROVIDER NOTES
"Encounter Date: 5/26/2023       History     Chief Complaint   Patient presents with    Seizures     Pt BIB EMS after brother found pt down at home having seizure. Pt CBG upon EMS arrival was 42. Pt was given 5mg versed and 25g D50 and 1mg glucagon. Repeat sugar 480 per EMS. Pt's brother not familiar with pt's PMhx, pt noncompliant with seizure medications.      This is a 77 yo gentleman with history of HTN, a-fib on Xarelto, HL, questionable seizure disorder, IDDM who was brought for evaluation of AMS. Patient supposedly last seen normal by his brother qt 09:30 today and when sibling returned at 14:10, noticed brother "seizing" on bed. EMS promptly summoned who noted an initial pulse ox in the 80's for which supplemental oxygen was administered with subsequent rise to the mid 90's on 100% NRB. Initial Accucheck 42 for which an amp of D50 was administered with subsequent rise to 180 then 480. Patient exhibited tonic seizure activity en route for which 5 mg IV Versed given. Patient was also administered 1 mg IM Glucagon. Brother states non-compliance with medications.   This   Review of patient's allergies indicates:  No Known Allergies  Past Medical History:   Diagnosis Date    Atrial fibrillation     Hypertension     Irregular heart beat     Supraventricular tachycardia      No past surgical history on file.  History reviewed. No pertinent family history.  Social History     Tobacco Use    Smoking status: Former    Smokeless tobacco: Never   Substance Use Topics    Alcohol use: Yes     Comment: socially     Review of Systems   Unable to perform ROS: Mental status change     Physical Exam     Initial Vitals [05/26/23 1514]   BP Pulse Resp Temp SpO2   (!) 116/59 (!) 119 (!) 22 98.4 °F (36.9 °C) (!) 86 %      MAP       --         Physical Exam    Nursing note and vitals reviewed.  Constitutional: Vital signs are normal. He appears well-developed and well-nourished.   HENT:   Head: Normocephalic and atraumatic.   Eyes: " Conjunctivae and lids are normal.   No anisocoria, 3 mm OU   Neck: Trachea normal and phonation normal. Neck supple. No thyroid mass and no thyromegaly present.   Normal range of motion.   Full passive range of motion without pain.     Cardiovascular:  Regular rhythm, normal heart sounds, intact distal pulses and normal pulses.     Exam reveals no gallop and no friction rub.       No murmur heard.  tachycardic   Pulmonary/Chest: He has no wheezes.   Bronchial breath sounds bilateral   Abdominal: Abdomen is soft. He exhibits no distension. There is no abdominal tenderness.   Genitourinary:    Penis normal.     Musculoskeletal:         General: Normal range of motion.      Right shoulder: No swelling, deformity or tenderness.      Cervical back: Full passive range of motion without pain, normal range of motion and neck supple. Normal.     Neurological:   Moving all extremities x 4, opens eyes to command, no facial drawing   Skin: Skin is warm, dry and intact.   Psychiatric: He has a normal mood and affect. His speech is normal and behavior is normal.       ED Course   Procedures  Labs Reviewed   CBC W/ AUTO DIFFERENTIAL - Abnormal; Notable for the following components:       Result Value    WBC 26.26 (*)     RBC 4.48 (*)      (*)     MCH 33.5 (*)     MCHC 31.3 (*)     nRBC 1 (*)     Gran % 83.0 (*)     Lymph % 14.0 (*)     Mono % 2.0 (*)     All other components within normal limits   COMPREHENSIVE METABOLIC PANEL - Abnormal; Notable for the following components:    Potassium 2.9 (*)     CO2 11 (*)     Glucose 428 (*)     Creatinine 1.5 (*)     Calcium 7.9 (*)     Albumin 3.2 (*)     Total Bilirubin 1.8 (*)     Alkaline Phosphatase 155 (*)     ALT <5 (*)     Anion Gap 33 (*)     eGFR 47 (*)     All other components within normal limits   B-TYPE NATRIURETIC PEPTIDE - Abnormal; Notable for the following components:     (*)     All other components within normal limits   URINALYSIS, REFLEX TO URINE CULTURE  - Abnormal; Notable for the following components:    Appearance, UA Hazy (*)     Protein, UA 3+ (*)     Glucose, UA 3+ (*)     Ketones, UA Trace (*)     Occult Blood UA 1+ (*)     Urobilinogen, UA 4.0-6.0 (*)     Leukocytes, UA 2+ (*)     All other components within normal limits    Narrative:     Specimen Source->Urine   DRUG SCREEN PANEL, URINE EMERGENCY - Abnormal; Notable for the following components:    Benzodiazepines Presumptive Positive (*)     All other components within normal limits    Narrative:     Specimen Source->Urine   ACETAMINOPHEN LEVEL - Abnormal; Notable for the following components:    Acetaminophen (Tylenol), Serum <3.0 (*)     All other components within normal limits   SALICYLATE LEVEL - Abnormal; Notable for the following components:    Salicylate Lvl <5.0 (*)     All other components within normal limits   URINALYSIS MICROSCOPIC - Abnormal; Notable for the following components:    WBC, UA 24 (*)     Hyaline Casts, UA 4 (*)     All other components within normal limits    Narrative:     Specimen Source->Urine   LACTIC ACID, PLASMA - Abnormal; Notable for the following components:    Lactate (Lactic Acid) 8.5 (*)     All other components within normal limits    Narrative:      LA critical result(s) called and verbal readback obtained from CAROLINA JOSHI RN by JESSICA 05/26/2023 18:20   PROCALCITONIN - Abnormal; Notable for the following components:    Procalcitonin 0.26 (*)     All other components within normal limits   POCT GLUCOSE - Abnormal; Notable for the following components:    POCT Glucose 399 (*)     All other components within normal limits   CULTURE, URINE   CULTURE, BLOOD   CULTURE, BLOOD   TROPONIN I   ALCOHOL,MEDICAL (ETHANOL)   LIPASE   LACTIC ACID, PLASMA   SARS-COV-2 RDRP GENE     EKG Readings: (Independently Interpreted)   Initial Reading: No STEMI. Rhythm: Sinus Tachycardia.   Sinus tachycardia, no path cheek ST-elevation, LVH, nonspecific ST and T-wave abnormality      Imaging Results              CT Head Without Contrast (Final result)  Result time 05/26/23 17:42:49      Final result by Hipolito Shultz MD (05/26/23 17:42:49)                   Impression:      Involutional changes of the right with no evidence of acute hemorrhage, mass or infarction.      Electronically signed by: Hipolito Shultz  Date:    05/26/2023  Time:    17:42               Narrative:    EXAMINATION:  CT HEAD WITHOUT CONTRAST    CLINICAL HISTORY:  Mental status change, unknown cause;    TECHNIQUE:  Low dose axial CT images obtained throughout the head without intravenous contrast. Sagittal and coronal reconstructions were performed.    COMPARISON:  None.    FINDINGS:  Intracranial compartment:    Ventricles and sulci are prominent in size for age suggesting involutional change without evidence of hydrocephalus. No extra-axial blood or fluid collections.    The brain parenchyma appears intact with prominence of the cortical sulcal markings basilar cisterns and ventricular system..  No parenchymal mass, hemorrhage, edema or major vascular distribution infarct.    Skull/extracranial contents (limited evaluation): No fracture. Mastoid air cells and paranasal sinuses are essentially clear.                                       X-Ray Chest AP Portable (Final result)  Result time 05/26/23 16:29:18      Final result by Puneet Ochoa MD (05/26/23 16:29:18)                   Impression:      1. Interstitial findings are accentuated by shallow inspiratory effort and habitus, mild edema is a consideration.  Correlation is advised.      Electronically signed by: Puneet Ochoa MD  Date:    05/26/2023  Time:    16:29               Narrative:    EXAMINATION:  XR CHEST AP PORTABLE    CLINICAL HISTORY:  CHF;    TECHNIQUE:  Single frontal view of the chest was performed.    COMPARISON:  09/03/2021    FINDINGS:  The cardiomediastinal silhouette is prominent, similar to the previous exam noting calcification of  "the aorta and overall magnification by technique.  Surgical changes are noted of the gastroesophageal junction..  There is no pleural effusion.  The trachea is midline.  The lungs are symmetrically expanded bilaterally with mildly coarse central hilar interstitial attenuation accentuated by shallow inspiratory effort and habitus..  No large focal consolidation seen.  There is no pneumothorax.  The osseous structures are remarkable for degenerative change and osteopenia..                                       Medications   LORazepam injection 2 mg (has no administration in time range)   diazePAM injection 10 mg (has no administration in time range)   sodium chloride 0.9% flush 10 mL (has no administration in time range)   melatonin tablet 6 mg (has no administration in time range)   enoxaparin injection 100 mg (has no administration in time range)   cefTRIAXone (ROCEPHIN) 2 g in dextrose 5 % in water (D5W) 5 % 50 mL IVPB (MB+) (0 g Intravenous Stopped 5/26/23 1802)   lactated ringers bolus 1,000 mL (1,000 mLs Intravenous New Bag 5/26/23 1736)   potassium chloride 10 mEq in 100 mL IVPB (0 mEq Intravenous Stopped 5/26/23 1923)   labetaloL injection 20 mg (20 mg Intravenous Given 5/26/23 1759)     Medical Decision Making:   Initial Assessment:   This is a 79 yo gentleman with history of HTN, a-fib on Xarelto, HL, questionable seizure disorder, IDDM who was brought for evaluation of AMS. Patient supposedly last seen normal by his brother qt 09:30 today and when sibling returned at 14:10, noticed brother "seizing" on bed. EMS promptly summoned who noted an initial pulse ox in the 80's for which supplemental oxygen was administered with subsequent rise to the mid 90's on 100% NRB. Initial Accucheck 42 for which an amp of D50 was administered with subsequent rise to 180 then 480. Patient exhibited tonic seizure activity en route for which 5 mg IV Versed given. Patient was also administered 1 mg IM Glucagon. Brother states " non-compliance with medications.   Differential Diagnosis:   DDx includes but not limited to: ICH, CVA, DKA, coagulopathy, electrolyte derangement, infection, seizure  ED Management:  Accucheck here is 399. Rectal temp afebrile. VBG exhibits a mixed acidosis. BIPAP instituted with O2 sats now in them mid 90's on an FiO2 of 60%. Patient now more alert and moving all extremities. CT head pending. No sign of trauma. Patient liely post-ictal and partially sedated by IV Versed administered earlier. Labs pending. Will gently hydrate. Patient with likely hypoglycemic episode with concomitant seizure activity. No seizure activity while in ED.   16:30 serum chemistry exhibits evidence of metabolic acidosis with elevated blood sugar. MA may be secondary to recent seizure activity.  Unlikely DKA as patient was initially severely hypoglycemic.   Patient is not a candidate for an insulin drip at this juncture due to his moderate hypokalemia.  Twenty mEq of IV potassium chloride will be administered.  Evidence of UTI for which IV Rocephin administered.  Will initiate septic workup. Will administer judicious IV fluids in light of possible CHF component.  I discussed with Pulmonary/CC Fellow at 17:20 who agrees with ICU admission for further management.   Blood pressure 205/123.  Will administer IV Labetalol.  CT head results pending.  Patient more alert.  Patient had been administered IV Ativan for persistent restlessness. No fever. Another family member had reported that patient consumes a fifth of voda daily and now with ETOH negligible. Concern for developing DT's.           Attending Attestation:         Attending Critical Care:   Critical Care Times:   ==============================================================  Total Critical Care Time - exclusive of procedural time: 60 minutes.  ==============================================================                     Clinical Impression:   Final diagnoses:  [R06.02] Shortness  of breath  [G40.909] Seizure disorder (Primary)  [N39.0] Urinary tract infection without hematuria, site unspecified  [R41.82] Altered mental status, unspecified altered mental status type  [E87.6] Hypokalemia        ED Disposition Condition    Admit                 Fadi Rasmussen MD  05/26/23 2040

## 2023-05-26 NOTE — Clinical Note
Diagnosis: Seizure disorder [041075]   Future Attending Provider: JUANCARLOS CARTY [[id number]]   Admitting Provider:: JUANCARLOS CARTY [[id number]]

## 2023-05-27 LAB
ALBUMIN SERPL BCP-MCNC: 2.9 G/DL (ref 3.5–5.2)
ALP SERPL-CCNC: 120 U/L (ref 55–135)
ALT SERPL W/O P-5'-P-CCNC: 6 U/L (ref 10–44)
AMMONIA PLAS-SCNC: 31 UMOL/L (ref 10–50)
ANION GAP SERPL CALC-SCNC: 13 MMOL/L (ref 8–16)
ANISOCYTOSIS BLD QL SMEAR: SLIGHT
AST SERPL-CCNC: 27 U/L (ref 10–40)
BASOPHILS # BLD AUTO: 0.11 K/UL (ref 0–0.2)
BASOPHILS NFR BLD: 0.5 % (ref 0–1.9)
BILIRUB DIRECT SERPL-MCNC: 0.5 MG/DL (ref 0.1–0.3)
BILIRUB SERPL-MCNC: 3.1 MG/DL (ref 0.1–1)
BUN SERPL-MCNC: 17 MG/DL (ref 8–23)
CALCIUM SERPL-MCNC: 7.6 MG/DL (ref 8.7–10.5)
CHLORIDE SERPL-SCNC: 102 MMOL/L (ref 95–110)
CHOLEST SERPL-MCNC: 116 MG/DL (ref 120–199)
CHOLEST/HDLC SERPL: 3.7 {RATIO} (ref 2–5)
CO2 SERPL-SCNC: 26 MMOL/L (ref 23–29)
CREAT SERPL-MCNC: 1.6 MG/DL (ref 0.5–1.4)
CREAT UR-MCNC: 80.8 MG/DL (ref 23–375)
DIFFERENTIAL METHOD: ABNORMAL
EOSINOPHIL # BLD AUTO: 0 K/UL (ref 0–0.5)
EOSINOPHIL NFR BLD: 0 % (ref 0–8)
ERYTHROCYTE [DISTWIDTH] IN BLOOD BY AUTOMATED COUNT: 14 % (ref 11.5–14.5)
EST. GFR  (NO RACE VARIABLE): 44 ML/MIN/1.73 M^2
GLUCOSE SERPL-MCNC: 149 MG/DL (ref 70–110)
HCT VFR BLD AUTO: 44.3 % (ref 40–54)
HDLC SERPL-MCNC: 31 MG/DL (ref 40–75)
HDLC SERPL: 26.7 % (ref 20–50)
HGB BLD-MCNC: 15.1 G/DL (ref 14–18)
IMM GRANULOCYTES # BLD AUTO: 0.21 K/UL (ref 0–0.04)
IMM GRANULOCYTES NFR BLD AUTO: 0.9 % (ref 0–0.5)
INR PPP: 1.2 (ref 0.8–1.2)
LACTATE SERPL-SCNC: 2.6 MMOL/L (ref 0.5–2.2)
LDLC SERPL CALC-MCNC: 68 MG/DL (ref 63–159)
LYMPHOCYTES # BLD AUTO: 0.9 K/UL (ref 1–4.8)
LYMPHOCYTES NFR BLD: 4 % (ref 18–48)
MAGNESIUM SERPL-MCNC: 1.2 MG/DL (ref 1.6–2.6)
MCH RBC QN AUTO: 33.6 PG (ref 27–31)
MCHC RBC AUTO-ENTMCNC: 34.1 G/DL (ref 32–36)
MCV RBC AUTO: 98 FL (ref 82–98)
MONOCYTES # BLD AUTO: 0.8 K/UL (ref 0.3–1)
MONOCYTES NFR BLD: 3.4 % (ref 4–15)
MRSA ID BY PCR: NEGATIVE
NEUTROPHILS # BLD AUTO: 20.6 K/UL (ref 1.8–7.7)
NEUTROPHILS NFR BLD: 91.2 % (ref 38–73)
NONHDLC SERPL-MCNC: 85 MG/DL
NRBC BLD-RTO: 0 /100 WBC
PHOSPHATE SERPL-MCNC: 4.2 MG/DL (ref 2.7–4.5)
PLATELET # BLD AUTO: 160 K/UL (ref 150–450)
PLATELET BLD QL SMEAR: ABNORMAL
PMV BLD AUTO: 10.3 FL (ref 9.2–12.9)
POCT GLUCOSE: 121 MG/DL (ref 70–110)
POCT GLUCOSE: 127 MG/DL (ref 70–110)
POCT GLUCOSE: 143 MG/DL (ref 70–110)
POCT GLUCOSE: 160 MG/DL (ref 70–110)
POTASSIUM SERPL-SCNC: 3.1 MMOL/L (ref 3.5–5.1)
PROT SERPL-MCNC: 6.2 G/DL (ref 6–8.4)
PROTHROMBIN TIME: 13.2 SEC (ref 9–12.5)
RBC # BLD AUTO: 4.5 M/UL (ref 4.6–6.2)
SODIUM SERPL-SCNC: 141 MMOL/L (ref 136–145)
SODIUM UR-SCNC: 21 MMOL/L (ref 20–250)
STAPH AUREUS ID BY PCR: NEGATIVE
TRIGL SERPL-MCNC: 85 MG/DL (ref 30–150)
TSH SERPL DL<=0.005 MIU/L-ACNC: 1.49 UIU/ML (ref 0.4–4)
WBC # BLD AUTO: 22.56 K/UL (ref 3.9–12.7)

## 2023-05-27 PROCEDURE — 84100 ASSAY OF PHOSPHORUS: CPT

## 2023-05-27 PROCEDURE — 25000003 PHARM REV CODE 250: Performed by: STUDENT IN AN ORGANIZED HEALTH CARE EDUCATION/TRAINING PROGRAM

## 2023-05-27 PROCEDURE — 27000221 HC OXYGEN, UP TO 24 HOURS

## 2023-05-27 PROCEDURE — 20000000 HC ICU ROOM

## 2023-05-27 PROCEDURE — 63600175 PHARM REV CODE 636 W HCPCS

## 2023-05-27 PROCEDURE — 51798 US URINE CAPACITY MEASURE: CPT

## 2023-05-27 PROCEDURE — 63600175 PHARM REV CODE 636 W HCPCS: Performed by: STUDENT IN AN ORGANIZED HEALTH CARE EDUCATION/TRAINING PROGRAM

## 2023-05-27 PROCEDURE — 25000003 PHARM REV CODE 250: Performed by: INTERNAL MEDICINE

## 2023-05-27 PROCEDURE — 83605 ASSAY OF LACTIC ACID: CPT | Performed by: STUDENT IN AN ORGANIZED HEALTH CARE EDUCATION/TRAINING PROGRAM

## 2023-05-27 PROCEDURE — 82570 ASSAY OF URINE CREATININE: CPT | Performed by: STUDENT IN AN ORGANIZED HEALTH CARE EDUCATION/TRAINING PROGRAM

## 2023-05-27 PROCEDURE — S5010 5% DEXTROSE AND 0.45% SALINE: HCPCS

## 2023-05-27 PROCEDURE — 94761 N-INVAS EAR/PLS OXIMETRY MLT: CPT

## 2023-05-27 PROCEDURE — 84443 ASSAY THYROID STIM HORMONE: CPT

## 2023-05-27 PROCEDURE — 85025 COMPLETE CBC W/AUTO DIFF WBC: CPT

## 2023-05-27 PROCEDURE — 25000003 PHARM REV CODE 250

## 2023-05-27 PROCEDURE — 85610 PROTHROMBIN TIME: CPT

## 2023-05-27 PROCEDURE — 36415 COLL VENOUS BLD VENIPUNCTURE: CPT | Performed by: STUDENT IN AN ORGANIZED HEALTH CARE EDUCATION/TRAINING PROGRAM

## 2023-05-27 PROCEDURE — 80053 COMPREHEN METABOLIC PANEL: CPT

## 2023-05-27 PROCEDURE — 84300 ASSAY OF URINE SODIUM: CPT | Performed by: STUDENT IN AN ORGANIZED HEALTH CARE EDUCATION/TRAINING PROGRAM

## 2023-05-27 PROCEDURE — 36415 COLL VENOUS BLD VENIPUNCTURE: CPT

## 2023-05-27 PROCEDURE — 82248 BILIRUBIN DIRECT: CPT | Performed by: STUDENT IN AN ORGANIZED HEALTH CARE EDUCATION/TRAINING PROGRAM

## 2023-05-27 PROCEDURE — 83735 ASSAY OF MAGNESIUM: CPT | Performed by: STUDENT IN AN ORGANIZED HEALTH CARE EDUCATION/TRAINING PROGRAM

## 2023-05-27 PROCEDURE — 99900035 HC TECH TIME PER 15 MIN (STAT)

## 2023-05-27 PROCEDURE — 82140 ASSAY OF AMMONIA: CPT

## 2023-05-27 PROCEDURE — 80061 LIPID PANEL: CPT

## 2023-05-27 RX ORDER — HYDROCHLOROTHIAZIDE 12.5 MG/1
12.5 TABLET ORAL DAILY
COMMUNITY
End: 2023-08-23 | Stop reason: ALTCHOICE

## 2023-05-27 RX ORDER — CLONIDINE 0.1 MG/24H
1 PATCH, EXTENDED RELEASE TRANSDERMAL
Status: DISCONTINUED | OUTPATIENT
Start: 2023-05-27 | End: 2023-05-29 | Stop reason: HOSPADM

## 2023-05-27 RX ORDER — MAGNESIUM SULFATE HEPTAHYDRATE 40 MG/ML
2 INJECTION, SOLUTION INTRAVENOUS ONCE
Status: COMPLETED | OUTPATIENT
Start: 2023-05-27 | End: 2023-05-27

## 2023-05-27 RX ORDER — ONDANSETRON 2 MG/ML
4 INJECTION INTRAMUSCULAR; INTRAVENOUS ONCE AS NEEDED
Status: COMPLETED | OUTPATIENT
Start: 2023-05-27 | End: 2023-05-27

## 2023-05-27 RX ORDER — LABETALOL HYDROCHLORIDE 5 MG/ML
10 INJECTION, SOLUTION INTRAVENOUS EVERY 4 HOURS PRN
Status: DISCONTINUED | OUTPATIENT
Start: 2023-05-27 | End: 2023-05-29 | Stop reason: HOSPADM

## 2023-05-27 RX ORDER — LABETALOL HYDROCHLORIDE 5 MG/ML
10 INJECTION, SOLUTION INTRAVENOUS ONCE
Status: COMPLETED | OUTPATIENT
Start: 2023-05-27 | End: 2023-05-27

## 2023-05-27 RX ORDER — DEXTROSE MONOHYDRATE AND SODIUM CHLORIDE 5; .45 G/100ML; G/100ML
INJECTION, SOLUTION INTRAVENOUS CONTINUOUS
Status: DISCONTINUED | OUTPATIENT
Start: 2023-05-27 | End: 2023-05-28

## 2023-05-27 RX ORDER — CARVEDILOL 12.5 MG/1
12.5 TABLET ORAL DAILY
COMMUNITY
End: 2023-08-23 | Stop reason: SDUPTHER

## 2023-05-27 RX ORDER — POTASSIUM CHLORIDE 7.45 MG/ML
10 INJECTION INTRAVENOUS
Status: COMPLETED | OUTPATIENT
Start: 2023-05-27 | End: 2023-05-27

## 2023-05-27 RX ORDER — VALSARTAN 40 MG/1
40 TABLET ORAL NIGHTLY
Status: DISCONTINUED | OUTPATIENT
Start: 2023-05-27 | End: 2023-05-29 | Stop reason: HOSPADM

## 2023-05-27 RX ADMIN — DIAZEPAM 10 MG: 10 INJECTION, SOLUTION INTRAMUSCULAR; INTRAVENOUS at 07:05

## 2023-05-27 RX ADMIN — VALSARTAN 40 MG: 40 TABLET, FILM COATED ORAL at 08:05

## 2023-05-27 RX ADMIN — LORAZEPAM 2 MG: 2 INJECTION INTRAMUSCULAR; INTRAVENOUS at 05:05

## 2023-05-27 RX ADMIN — DIAZEPAM 10 MG: 10 INJECTION, SOLUTION INTRAMUSCULAR; INTRAVENOUS at 02:05

## 2023-05-27 RX ADMIN — POTASSIUM CHLORIDE 10 MEQ: 7.46 INJECTION, SOLUTION INTRAVENOUS at 02:05

## 2023-05-27 RX ADMIN — ENOXAPARIN SODIUM 100 MG: 100 INJECTION SUBCUTANEOUS at 09:05

## 2023-05-27 RX ADMIN — LABETALOL HYDROCHLORIDE 10 MG: 5 INJECTION INTRAVENOUS at 04:05

## 2023-05-27 RX ADMIN — CEFTRIAXONE SODIUM 2 G: 2 INJECTION, POWDER, FOR SOLUTION INTRAMUSCULAR; INTRAVENOUS at 04:05

## 2023-05-27 RX ADMIN — CLONIDINE 1 PATCH: 0.1 PATCH TRANSDERMAL at 12:05

## 2023-05-27 RX ADMIN — MUPIROCIN: 20 OINTMENT TOPICAL at 08:05

## 2023-05-27 RX ADMIN — POTASSIUM CHLORIDE 10 MEQ: 7.46 INJECTION, SOLUTION INTRAVENOUS at 10:05

## 2023-05-27 RX ADMIN — POTASSIUM CHLORIDE 10 MEQ: 7.46 INJECTION, SOLUTION INTRAVENOUS at 09:05

## 2023-05-27 RX ADMIN — POTASSIUM CHLORIDE 10 MEQ: 7.46 INJECTION, SOLUTION INTRAVENOUS at 07:05

## 2023-05-27 RX ADMIN — LORAZEPAM 2 MG: 2 INJECTION INTRAMUSCULAR; INTRAVENOUS at 09:05

## 2023-05-27 RX ADMIN — MAGNESIUM SULFATE 2 G: 2 INJECTION INTRAVENOUS at 10:05

## 2023-05-27 RX ADMIN — POTASSIUM CHLORIDE 10 MEQ: 7.46 INJECTION, SOLUTION INTRAVENOUS at 01:05

## 2023-05-27 RX ADMIN — DIAZEPAM 10 MG: 10 INJECTION, SOLUTION INTRAMUSCULAR; INTRAVENOUS at 12:05

## 2023-05-27 RX ADMIN — POTASSIUM CHLORIDE 10 MEQ: 7.46 INJECTION, SOLUTION INTRAVENOUS at 08:05

## 2023-05-27 RX ADMIN — POTASSIUM CHLORIDE 10 MEQ: 7.46 INJECTION, SOLUTION INTRAVENOUS at 12:05

## 2023-05-27 RX ADMIN — POTASSIUM CHLORIDE 10 MEQ: 7.46 INJECTION, SOLUTION INTRAVENOUS at 11:05

## 2023-05-27 RX ADMIN — THIAMINE HYDROCHLORIDE 100 MG: 100 INJECTION, SOLUTION INTRAMUSCULAR; INTRAVENOUS at 04:05

## 2023-05-27 RX ADMIN — ENOXAPARIN SODIUM 100 MG: 100 INJECTION SUBCUTANEOUS at 08:05

## 2023-05-27 RX ADMIN — DEXTROSE AND SODIUM CHLORIDE: 5; .45 INJECTION, SOLUTION INTRAVENOUS at 06:05

## 2023-05-27 RX ADMIN — AZITHROMYCIN MONOHYDRATE 500 MG: 500 INJECTION, POWDER, LYOPHILIZED, FOR SOLUTION INTRAVENOUS at 08:05

## 2023-05-27 RX ADMIN — POTASSIUM CHLORIDE 10 MEQ: 7.46 INJECTION, SOLUTION INTRAVENOUS at 04:05

## 2023-05-27 RX ADMIN — LABETALOL HYDROCHLORIDE 10 MG: 5 INJECTION INTRAVENOUS at 09:05

## 2023-05-27 RX ADMIN — ONDANSETRON 4 MG: 2 INJECTION INTRAMUSCULAR; INTRAVENOUS at 12:05

## 2023-05-27 RX ADMIN — Medication 6 MG: at 08:05

## 2023-05-27 NOTE — PROGRESS NOTES
"\[facility]\"" Hospital Medicine Progress Note    Primary Team: \[facility]\"" Hospitalist Team B  Attending Physician: Ferny Haas MD  Resident: Lisa  Intern: Sandra    Subjective:      NAEON. Pt was seen at bedside still acutely encephalopathic. He opens his eyes and exhibits partial tracking but does not respond or react to verbal stimuli. 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     Objective:     Last 24 Hour Vital Signs:  BP  Min: 116/66  Max: 224/117  Temp  Av.1 °F (36.7 °C)  Min: 97.8 °F (36.6 °C)  Max: 98.5 °F (36.9 °C)  Pulse  Av.1  Min: 92  Max: 159  Resp  Av.2  Min: 10  Max: 39  SpO2  Av.6 %  Min: 86 %  Max: 100 %  Height  Av' 11" (180.3 cm)  Min: 5' 11" (180.3 cm)  Max: 5' 11" (180.3 cm)  Weight  Av kg (226 lb 15.7 oz)  Min: 98 kg (216 lb)  Max: 107 kg (235 lb 14.3 oz)  I/O last 3 completed shifts:  In: 3522 [I.V.:900.2; IV Piggyback:2621.8]  Out: 75 [Urine:75]    Physical Examination:  GGeneral: WNWD adult male laying in bed, restrained but restless  HEENT: NC/AT, PERRL, EOMI, MMM, NC in place  Neck: Supple, trachea midline  CV: Grossly normal to auscultation (difficult to appreciate d/t pt movement)  Resp: Grossly CTAB, on 2 L via NC  Abdominal: Soft, NT/ND  Extremities: 2+ pulses, ROM grossly intact, no BLE edema  Skin: Warm, dry, intact, no rash or erythema appreciated on exposed skin  Neuro: Alert, not oriented, opens eyes w/ intermittent tracking, spontaneously moving extremities  Psych: Unable to assess       Laboratory:  Laboratory Data Reviewed: yes  Pertinent Findings:  Recent Labs   Lab 23  1547 23  0505   WBC 26.26* 22.56*   HGB 15.0 15.1   HCT 48.0 44.3    160   * 98   RDW 14.1 14.0    141   K 2.9* 3.1*   CL 97 102   CO2 11* 26   BUN 13 17   CREATININE 1.5* 1.6*   * 149*   PROT 6.7 6.2   ALBUMIN 3.2* 2.9*   BILITOT 1.8* 3.1*   AST 24 27   ALKPHOS " 155* 120   ALT <5* 6*     Recent Labs   Lab 05/26/23  1547   TROPONINI <0.006   *        Microbiology Data Reviewed: yes  Pertinent Findings:  Microbiology Results (last 7 days)       Procedure Component Value Units Date/Time    Blood culture x two cultures. Draw prior to antibiotics. [[id number]] Collected: 05/26/23 1630    Order Status: Sent Specimen: Blood from Peripheral, Antecubital, Left Updated: 05/27/23 0205    Blood culture x two cultures. Draw prior to antibiotics. [[id number]] Collected: 05/26/23 1654    Order Status: Sent Specimen: Blood from Peripheral, Antecubital, Left Updated: 05/27/23 0205    Urine culture [[id number]] Collected: 05/26/23 1605    Order Status: No result Specimen: Urine Updated: 05/26/23 1625             Other Results:  EKG (my interpretation): No new study since admission    Radiology Data Reviewed: yes  Pertinent Findings:  CT Head Without Contrast   Final Result      Involutional changes of the right with no evidence of acute hemorrhage, mass or infarction.         Electronically signed by: Hipolito Shultz   Date:    05/26/2023   Time:    17:42      X-Ray Chest AP Portable   Final Result      1. Interstitial findings are accentuated by shallow inspiratory effort and habitus, mild edema is a consideration.  Correlation is advised.         Electronically signed by: Puneet Ochoa MD   Date:    05/26/2023   Time:    16:29           Current Medications:     Infusions:       Scheduled:   azithromycin  500 mg Intravenous Daily    cefTRIAXone (ROCEPHIN) IVPB  2 g Intravenous Daily    enoxparin  1 mg/kg Subcutaneous Q12H (prophylaxis, 0900/2100)    mupirocin   Nasal BID    potassium chloride  10 mEq Intravenous Q1H        PRN:  diazePAM, lorazepam, melatonin, sodium chloride 0.9%    Antibiotics and Day Number of Therapy:  Antibiotics (72h ago, onward)      Start     Stop Route Frequency Ordered    05/27/23 1700  cefTRIAXone (ROCEPHIN) 2 g in dextrose 5 % in water (D5W) 5 % 50 mL IVPB  (MB+)         -- IV Daily 23  mupirocin 2 % ointment         2059 Nasl 2 times daily 23  azithromycin (ZITHROMAX) 500 mg in dextrose 5 % (D5W) 250 mL IVPB (Vial-Mate)         -- IV Daily 23             Lines and Day Number of Therapy:  PIV       Assessment and Plan:     Dejuan Correa is a 78 y.o. male w/ PMHx of pAfib, HTN, HLD, and EtOh Use Disorder who presented to [facility] on 2023 with a primary complaint of seizure-like activity around 2:00 pm today. He was admitted to [facility] for acute encephalopathy / seizure-like activity. Pt has not demonstrated any further seizure-like activity but remains acutely encephalopathic.      Acute Encephalopathy  Seizure like Activity  Found at approximately 2:00 pm down and in what appeared to be a convulsive episode.  Plan:  - Etiologies include EtOH w/d vs hypoglycemia vs infection  - Unclear if pt has previous seizure disorder per hx given by brother  - Troponin negative,   - COVID negative  - Procal slightly elevated at 0.26  - Lipase negative  - LA 8.5, continue to trend  - UDS positive for benzos  - EtOH level < 10  - VB.02/54/62/14  - Bcx x 2 collected in ED  - s/p CTX 2g IV and 1 L LR bolus in ED  - Continue Azithro and CTX for possible aspiration event during seizure  - Beta hydroxybutyrate level negative  - AB.47/40.1/124/29.1  - tele-Psych consulted, appreciate recs  - Continue to closely monitor vitals  - Ativan 2 mg q4h prn for anxiety or CIWA > 8  - Diazepam 10 mg q6h prn for seizures or EtOH w/d  - Plan to transfer to [facility] if experiences another seizure for EEG     Acute Hypoxic Respiratory Failure  Pt seen w/ NRB on 15 L.  Plan:  - VBG, procal, COVID, UDS as above  - CXR did not demonstrate any striking abnormalities  - Azithro and CTX as above  - AB.47/40.1/124/29.1  - Continue to wean O2 as tolerated     EtOH Use  "Disorder  Collateral from daughter states that pt has been a daily binge drinker; 3-7 "fifths" of vodka per day.  Plan:  - EtOH level < 10  - CIWA protocol  - Ativan and Diazepam as above  - s/p banana bag on day of admit  - Thiamine level pending  - Thiamine 100 mg daily x 5 days  - U/S Abd pending  - Continue to closely monitor     Hypokalemia  K 2.9 on CMP in ED.  Plan:  - s/p Kcl 20 meq given in ED  - K 3.1 this morning, continuing to replete w/ K riders  - Continue to monitor on morning labs     Afib  Hx per chart review. Currently NSR.  Plan:  - Monitor on Tele  - Continue to monitor for symptoms     Hypertensive Urgency  HTN  Per chart review, pt takes Valsartan 40 mg daily at home. He was normotensive at initial presentation but shortly thereafter became hypertensive to 224/117.  Plan:  - s/p Labetalol 20 mg IV in ED  - Clonidine 0.1 mg 24 hr patch  - Labetalol 10 mg IV q4h PRN for SPB > 180  - Continue Valsartan when pt is able  - Continue to routinely check vitals     HLD  Per chart review. No apparent home meds.  Plan:  - Lipid panel demonstrated: decreased total cholesterol and HDL w/ normal triglycerides and LDL  - Can start statin therapy as appropriate        Code Status: Full code  VTE Ppx: Lovenox  Diet: NPO     Disposition: Pending Psych recommendations and U/S Abd      Sunny Flores DO  [facility] Internal Medicine, PGY-1  [facility] Hospital Medicine Team B    [facility] Medicine Hospitalist Pager numbers:   [facility] Hospitalist Medicine Team A (Anila/Priscilla): [phone]  [facility] Hospitalist Medicine Team B (Reji/Samina):  [phone]  "

## 2023-05-27 NOTE — CONSULTS
Consult Note  [facility]    Attending: Dr. Haas  Admit Date: 5/26/2023  Today's Date: 05/27/2023    HPI: 77 yo M with PMH HTN, pAfib, HLD, alcohol use disorder, seizure dx (unknown?) who was BIB EMS after seizure at home around 2PM on 5/26. Was found to be hypoxic 80s and with BS 42. Was given D50 and glucagon x1. Had another witnessed seizure like activity and was given Versed 5 x1. Per chart review, patient has very few records here and possibly sees a PCP in [place] by daughter.  Primary team called patient's brother who states he does not take AEDs but thinks he is supposed to be on them. Patient drinks 1/5 or so of vodka daily for over 20 years.     SUBJECTIVE:     Patient seen and examined this morning. Still not following commands but does open eyes. Was just given valium prior to examination.     OBJECTIVE:     Vital Signs Trends/Hx Reviewed  Vitals:    05/27/23 0600 05/27/23 0700 05/27/23 0712 05/27/23 0730   BP: 130/72 133/78     BP Location:  Right arm     Patient Position:  Lying     Pulse: 100 96 97 107   Resp: (!) 22 17 (!) 23 (!) 35   Temp:   98.5 °F (36.9 °C)    TempSrc:   Axillary    SpO2: 95% 100% 100%    Weight: 107 kg (235 lb 14.3 oz)      Height:           Oxygen Concentration (%):  [6-60] 6        Physical Exam  Vitals reviewed.   Constitutional:       General: He is not in acute distress.     Appearance: He is not toxic-appearing.   Cardiovascular:      Rate and Rhythm: Normal rate and regular rhythm.      Pulses: Normal pulses.   Pulmonary:      Effort: Pulmonary effort is normal. No respiratory distress.      Breath sounds: Normal breath sounds. No wheezing, rhonchi or rales.      Comments: Shallow breaths  Abdominal:      Palpations: Abdomen is soft.      Tenderness: There is no abdominal tenderness.   Musculoskeletal:      Right lower leg: No edema.      Left lower leg: No edema.      Comments: Moves extremities spontaneously    Skin:     General: Skin is  warm.   Neurological:      Mental Status: He is disoriented.      Comments: Does not follow commands but does open eyes to voice       Laboratory:  Recent Labs   Lab 05/27/23  0505   WBC 22.56*   RBC 4.50*   HGB 15.1   HCT 44.3      MCV 98   MCH 33.6*   MCHC 34.1     Recent Labs   Lab 05/27/23  0505      K 3.1*      CO2 26   BUN 17   CREATININE 1.6*     Recent Labs   Lab 05/26/23  2035   PH 7.468*   PCO2 40.1   PO2 124*   HCO3 29.1*   POCSATURATED 99   BE 5           ASSESSMENT & RECOMMENDATIONS   77 yo M with PMH HTN, pAfib, HLD, alcohol use disorder, seizure dx (?) who was BIB EMS after seizure at home. Found to be hypoglycemic 40s. LA 8.5 downtrending. Unclear of his baseline mental status. Suspect seizure related to hypoglycemia vs alcohol withdrawal vs underlying seizure disorder.     Neuro/Psych  #Acute encephalopathy - likely alcohol withdrawal vs seizure  #Seizure disorder (?)  Was hypoglycemia 40s in ED  CT imaging unremarkable  Has PRN Valium for seizures and Ativan for CIWA>8  Has not been loaded with Keppra, need more collateral to see what AEDs patient is supposed to be on    CV  #HTN  Possibly on Valsartan 40 mg at home?   Persistently hypertensive while here  Would add on anti-HTN for goal SBP <160    #pAfib  EKG w/ Sinus tach on admit  Unsure if patient is still on Eliquis though noted in his chart from 2021     Pulm  #Acute Hypoxic Respiratory Failure  Intermittent de-sats, on 2-3L NC  Most recent ABG as above  CXR with no consolidation, coarse central hilar attenuation   Possible suspicion of CAREY noted per chart review    FEN/GI  Diet: NPO until mental status improves  Replete electrolytes for goal K>3, Phos >3, Mg >2    #Elevated Tbili  PT 13.2 INR 1.2  Ammonia 31  Will order RUQ US to assess for cirrhosis, has no prior abdominal imaging  Monitor LFTs     RENAL  UOP: 75cc , In: 3522cc, net +3447cc in last 24 hrs    #ALEYDA vs CKD   BUN/Cr: 17/1.6 unsure of his baseline? Only has 1  value from 2021 with Cr 1.6   Continue to monitor renal status and urine output     Heme  H/H 15.1/44.3; stable  WBC 22.56 down-trending from admit 26.26  DVT prophylaxis: Lovenox    Endo  No known issues at this time.  Goal -180  POCT q6h    ID  #Leukocytosis  Remains afebrile  Cultures are sent   UA with 2+ leukocytes, no nitrites. WBC24  Currently on Rocephin, Azithromycin treating presumed UTI and PNA    Feeding: NPO  Analgesia: none  Sedation: benzos   Thrombo PPX: Lovenox  Head of Bed: > 30 degrees  Ulcer PPX: N/A  Glucose: goal 140-180s  SBT/SAT: N/A  Bowel Regimen: none, unsure of last BM  Indwelling Lines: condom cath, PIV x2  Abx: Rocephin, Azithromycin      Code Status: Full    Arielle Morgan MD  [facility], PGY-1  05/27/2023

## 2023-05-27 NOTE — EICU
"EICU Note    77 y/o male with a PMH of HTN, questionable seizure disorder,and a-fib on Xarelto presents with complaints of having a seizure. Seen by EMS and received oxygen for a saturation in the 80s, and  D50  for a BS of 42. Patient again seized in the ambulance and was given versed 5 mg IV x1.     Currently:    BP (!) 157/77   Pulse 95   Temp 98.1 °F (36.7 °C) (Axillary)   Resp 19   Ht 5' 11" (1.803 m)   Wt 98 kg (216 lb)   SpO2 97%   BMI 30.13 kg/m²     Labs:    CBC: WBC 26.26/ Hgb 15/ Hct 48/ plts 251 K    BMP:  Na 141/ K 2.9/ CL 97/ CO2 11/ AGAP 33/ BUN 13/ Cr 1.5             Glucose 428    Alkphos: 156  BNP: 101  Troponin: < 0.006  Acetaminophen: 3.0  Salicylate: < 5.0  Alcohol: <10    UDS: positive benzodiazepines    CT Scan brain w/o contrast: Final reading  Impression:  Involutional changes of the right with no evidence of acute hemorrhage, mass or infarction.    Chest Xray: Final result     Impression:  1. Interstitial findings are accentuated by shallow inspiratory effort and habitus, mild edema is a consideration.  Correlation is advised.        Impression;  -Probable seizures  -Hypoxia R/O aspiration   -Hypertension    Plan;   Continue present management of IV azithromycin and rocephin, valium PRN if patient should have seizures, hydration. Neurology evaluation      "

## 2023-05-27 NOTE — PLAN OF CARE
Problem: Adult Inpatient Plan of Care  Goal: Plan of Care Review  Outcome: Ongoing, Progressing  Goal: Patient-Specific Goal (Individualized)  Outcome: Ongoing, Progressing  Goal: Absence of Hospital-Acquired Illness or Injury  Outcome: Ongoing, Progressing  Goal: Optimal Comfort and Wellbeing  Outcome: Ongoing, Progressing  Goal: Readiness for Transition of Care  Outcome: Ongoing, Progressing     Problem: Alcohol Withdrawal  Goal: Alcohol Withdrawal Symptom Control  Outcome: Ongoing, Progressing     Problem: Seizure, Active Management  Goal: Absence of Seizure/Seizure-Related Injury  Outcome: Ongoing, Progressing  Pt remains nonverbal and uncooperative. He does not follow commands and does not make eye contact when speaking to pt. Afebrile overnight. ST w/ pvcs when agitated w/BP elevated but ST normotensive when resting.  Sats remained >90 on 6L HFNC. No UOP since arrival BS 87 cc. One dose of valium and ativan given during shift when CIWA>8. Pt remains in restraints overnight. Gordy Roberts(dtr) on POC will call in AM. Frequent checks for safety done during shift. Will report to oncoming RN.

## 2023-05-27 NOTE — H&P
[facility] Medicine H&P Note     Admitting Team: [facility] Hospitalist Team B  Attending Physician: Ferny Haas MD  Resident: Lisa  Intern: Sandra    Date of Admit: 5/26/2023    Chief Complaint     Seizure-like activity earlier today w/ new-onset altered mentation    Subjective:      History of Present Illness:  Dejuan Correa is a 78 y.o. male w/ PMHx of pAfib, HTN, HLD, and EtOh Use Disorder who presented to [facility] on 5/26/2023 with a primary complaint of seizure-like activity around 2:00 pm today.     The patient was in their usual state of health until approximately 2:00 pm today when he was found down at home having what appeared to be  seizure. Last known normal was 9:30 am. CBG upon EMS arrival was 42, and D50 25 g and Glugcagon 1 mg was given in the field. EMS also gave 5 mg Versed while in route for continued seizure-like activity. Repeat CBG w/ EMS was 480 while in route. Pt's brother provided limited collateral stating that the pt is non-compliant with AEDs. Pt's daughter also called in and spoke w/ nursing staff stating that the pt has a 20 yr hx of drinking multiple fifths of vodka daily. Pt is currently unable to verbally respond to questioning.    Daughter states that pt's former PCP in [place] is Dr. Francis and can be contacted at [phone].    Past Medical History:  Past Medical History:   Diagnosis Date    Atrial fibrillation     Hypertension     Irregular heart beat     Supraventricular tachycardia        Past Surgical History:  No past surgical history on file.    Allergies:  Review of patient's allergies indicates:  No Known Allergies    Home Medications:  Prior to Admission medications    Medication Sig Start Date End Date Taking? Authorizing Provider   valsartan (DIOVAN) 40 MG tablet TAKE 1 TABLET (40 MG TOTAL) BY MOUTH ONCE DAILY. PT STATED TAKING 320MG ONCE DAILY  Patient taking differently: Take 40 mg by mouth once daily. 8/10/22   Britney Cooper MD  "  atorvastatin (LIPITOR) 10 MG tablet Take 1 tablet (10 mg total) by mouth once daily. 21  Britney Cooper MD   carvediloL (COREG) 12.5 MG tablet Take 1 tablet (12.5 mg total) by mouth 2 (two) times daily. 21  Britney Cooper MD   hydroCHLOROthiazide (HYDRODIURIL) 12.5 MG Tab TAKE 1 TABLET BY MOUTH DAILY 10/24/22 5/26/23  Roberto Howard MD   rivaroxaban (XARELTO) 20 mg Tab Take 1 tablet (20 mg total) by mouth once daily. 21  Britney Cooper MD       Family History:  History reviewed. No pertinent family history.    Social History:  Social History     Tobacco Use    Smoking status: Former    Smokeless tobacco: Never   Substance Use Topics    Alcohol use: Yes     Comment: socially       Review of Systems:  Unable to adequately assess 2/2 acute encephalopathy    Health Maintaince :   Primary Care Physician: Joe De Jesus Jr, MD    Immunizations:   Immunization History   Administered Date(s) Administered    COVID-19, MRNA, LN-S, PF (Pfizer) (Purple Cap) 2021, 2021, 2022       Tdap: unknown  Flu: unknown  Pna: unknown    Cancer Screening:  Colonoscopy: unknown     Objective:   Last 24 Hour Vital Signs:  BP  Min: 116/59  Max: 224/117  Temp  Av.5 °F (36.9 °C)  Min: 98.4 °F (36.9 °C)  Max: 98.5 °F (36.9 °C)  Pulse  Av.3  Min: 98  Max: 159  Resp  Av.8  Min: 10  Max: 39  SpO2  Av.6 %  Min: 86 %  Max: 100 %  Height  Av' 11" (180.3 cm)  Min: 5' 11" (180.3 cm)  Max: 5' 11" (180.3 cm)  Weight  Av kg (216 lb)  Min: 98 kg (216 lb)  Max: 98 kg (216 lb)  Body mass index is 30.13 kg/m².  I/O last 3 completed shifts:  In: 50 [IV Piggyback:50]  Out: -     Physical Examination:  General: WNWD adult male laying in bed, restrained but restless  HEENT: NC/AT, PERRL, EOMI, MMM, NRB in place  Neck: Supple, trachea midline  CV: Grossly normal to auscultation (difficult to appreciate d/t pt movement)  Resp: Grossly CTAB, on 15 L via " NRB  Abdominal: Soft, NT/ND  Extremities: 2+ pulses, ROM grossly intact, no BLE edema  Skin: Warm, dry, intact, no rash or erythema appreciated on exposed skin  Neuro: Alert, not oriented, opens eyes w/ intermittent tracking, spontaneously moving extremities  Psych: Unable to assess    Laboratory:  Most Recent Data:  CBC:   Lab Results   Component Value Date    WBC 26.26 (H) 05/26/2023    HGB 15.0 05/26/2023    HCT 48.0 05/26/2023     05/26/2023     (H) 05/26/2023    RDW 14.1 05/26/2023     BMP:   Lab Results   Component Value Date     05/26/2023    K 2.9 (L) 05/26/2023    CL 97 05/26/2023    CO2 11 (L) 05/26/2023    BUN 13 05/26/2023    CREATININE 1.5 (H) 05/26/2023     (H) 05/26/2023    CALCIUM 7.9 (L) 05/26/2023    MG 1.6 09/03/2021     LFTs:   Lab Results   Component Value Date    PROT 6.7 05/26/2023    ALBUMIN 3.2 (L) 05/26/2023    BILITOT 1.8 (H) 05/26/2023    AST 24 05/26/2023    ALKPHOS 155 (H) 05/26/2023    ALT <5 (L) 05/26/2023     FLP:   Lab Results   Component Value Date    CHOL 131 09/30/2021    HDL 34 (L) 09/30/2021    LDLCALC 71.8 09/30/2021    TRIG 126 09/30/2021    CHOLHDL 26.0 09/30/2021     DM:   Lab Results   Component Value Date    LDLCALC 71.8 09/30/2021    CREATININE 1.5 (H) 05/26/2023     Cardiac:   Lab Results   Component Value Date    TROPONINI <0.006 05/26/2023     (H) 05/26/2023     Urinalysis:   Lab Results   Component Value Date    LABURIN No significant growth 09/03/2021    COLORU Yellow 05/26/2023    SPECGRAV 1.020 05/26/2023    NITRITE Negative 05/26/2023    KETONESU Trace (A) 05/26/2023    UROBILINOGEN 4.0-6.0 (A) 05/26/2023    WBCUA 24 (H) 05/26/2023       Trended Lab Data:  Recent Labs   Lab 05/26/23  1547   WBC 26.26*   HGB 15.0   HCT 48.0      *   RDW 14.1      K 2.9*   CL 97   CO2 11*   BUN 13   CREATININE 1.5*   *   PROT 6.7   ALBUMIN 3.2*   BILITOT 1.8*   AST 24   ALKPHOS 155*   ALT <5*       Trended Cardiac  Data:  Recent Labs   Lab 05/26/23  1547   TROPONINI <0.006   *       Microbiology Data:  Microbiology Results (last 7 days)       Procedure Component Value Units Date/Time    Blood culture x two cultures. Draw prior to antibiotics. [[id number]] Collected: 05/26/23 1654    Order Status: Sent Specimen: Blood from Peripheral, Antecubital, Left Updated: 05/26/23 1655    Blood culture x two cultures. Draw prior to antibiotics. [[id number]] Collected: 05/26/23 1630    Order Status: Sent Specimen: Blood from Peripheral, Antecubital, Left Updated: 05/26/23 1655    Urine culture [[id number]] Collected: 05/26/23 1605    Order Status: No result Specimen: Urine Updated: 05/26/23 1625             Other Results:  EKG (my interpretation): Sinus tach at 123 bpm. T wave flattening in V4 and V5. ST depression in V4 and V5.    Radiology:  Imaging Results              CT Head Without Contrast (Final result)  Result time 05/26/23 17:42:49      Final result by Hipolito Shultz MD (05/26/23 17:42:49)                   Impression:      Involutional changes of the right with no evidence of acute hemorrhage, mass or infarction.      Electronically signed by: Hipolito Shultz  Date:    05/26/2023  Time:    17:42               Narrative:    EXAMINATION:  CT HEAD WITHOUT CONTRAST    CLINICAL HISTORY:  Mental status change, unknown cause;    TECHNIQUE:  Low dose axial CT images obtained throughout the head without intravenous contrast. Sagittal and coronal reconstructions were performed.    COMPARISON:  None.    FINDINGS:  Intracranial compartment:    Ventricles and sulci are prominent in size for age suggesting involutional change without evidence of hydrocephalus. No extra-axial blood or fluid collections.    The brain parenchyma appears intact with prominence of the cortical sulcal markings basilar cisterns and ventricular system..  No parenchymal mass, hemorrhage, edema or major vascular distribution infarct.    Skull/extracranial  contents (limited evaluation): No fracture. Mastoid air cells and paranasal sinuses are essentially clear.                                       X-Ray Chest AP Portable (Final result)  Result time 05/26/23 16:29:18      Final result by Puneet Ochoa MD (05/26/23 16:29:18)                   Impression:      1. Interstitial findings are accentuated by shallow inspiratory effort and habitus, mild edema is a consideration.  Correlation is advised.      Electronically signed by: Puneet Ochoa MD  Date:    05/26/2023  Time:    16:29               Narrative:    EXAMINATION:  XR CHEST AP PORTABLE    CLINICAL HISTORY:  CHF;    TECHNIQUE:  Single frontal view of the chest was performed.    COMPARISON:  09/03/2021    FINDINGS:  The cardiomediastinal silhouette is prominent, similar to the previous exam noting calcification of the aorta and overall magnification by technique.  Surgical changes are noted of the gastroesophageal junction..  There is no pleural effusion.  The trachea is midline.  The lungs are symmetrically expanded bilaterally with mildly coarse central hilar interstitial attenuation accentuated by shallow inspiratory effort and habitus..  No large focal consolidation seen.  There is no pneumothorax.  The osseous structures are remarkable for degenerative change and osteopenia..                                         Assessment and Plan:     Dejuan Correa is a 78 y.o. male w/ PMHx of pAfib, HTN, HLD, and EtOh Use Disorder who presented to [facility] on 5/26/2023 with a primary complaint of seizure-like activity around 2:00 pm today. He was admitted to [facility] for acute encephalopathy / seizure-like activity.    Acute Encephalopathy  Seizure like Activity  Found at approximately 2:00 pm down and in what appeared to be a convulsive episode.  Plan:  - Etiologies include EtOH w/d vs hypoglycemia vs infection  - Unclear if pt has previous seizure disorder per hx given by brother  -  "Troponin negative,   - COVID negative  - Procal slightly elevated at 0.26  - Lipase negative  - LA 8.5, continue to trend  - UDS positive for benzos  - EtOH level < 10  - VB.02/54/62/14  - Bcx x 2 collected in ED  - s/p CTX 2g IV and 1 L LR bolus in ED  - Azithro and CTX for possible aspiration event during seizure  - Check beta hydroxybutyrate level  - Check ABG  - Continue to closely monitor vitals    Acute Hypoxic Respiratory Failure  Pt seen w/ NRB on 15 L.  Plan:  - VBG, procal, COVID, UDS as above  - CXR did not demonstrate any striking abnormalities  - Azithro and CTX as above  - Check ABG  - Continue to wean O2 as tolerated    EtOH Use Disorder  Collateral from daughter states that pt has been a daily binge drinker; 3-7 "fifths" of vodka per day.  Plan:  - EtOH level < 10  - CIWA protocol  - Give banana bag  - Check thiamine level  - Continue to closely monitor    Hypokalemia  K 2.9 on CMP in ED.  Plan:  - s/p Kcl 20 meq given in ED  - Continue to replete K as needed  - Continue to monitor on morning labs    Afib  Hx per chart review. Currently NSR.  Plan:  - Monitor on Tele  - Continue to monitor for symptoms    Hypertensive Urgency  HTN  Per chart review, pt takes Valsartan 40 mg daily at home. He was normotensive at initial presentation but shortly thereafter became hypertensive to 224/117.  Plan:  - s/p Labetalol 20 mg IV in ED  - Continue Valsartan when pt is able  - Continue to routinely check vitals    HLD  Per chart review. No apparent home meds.  Plan:  - Check lipid panel  - Can start statin therapy as appropriate      Code Status: Full code  VTE Ppx: Lovenox  Diet: NPO    Disposition: Pending improvement in acute encephalopathy and respiratory status    Sunny Flores,   [facility] Medicine, PGY-1  [facility] Hospital Medicine Team B    [facility] Medicine Hospitalist Pager numbers:   [facility] Hospitalist Medicine Team A (Anila/Priscilla): [phone]  [facility] Hospitalist Medicine Team B " (Reji/Samina):  [phone]

## 2023-05-27 NOTE — NURSING TRANSFER
Nursing Transfer Note      5/26/2023     Reason patient is being transferred: Sz/Etoh withdrawal    Transfer From: ED    Transfer via stretcher    Transfer with high flow 15 NC to O2, cardiac monitoring    Transported by Nyasia ED RN    Telemetry: Box Number , Rate 109, and Rhythm ST w/pvcs    Medicines sent: IVF    Any special needs or follow-up needed: Stephanie    Chart send with patient: Yes    Notified: Nolan(spouse), Alejandra(daughter) [phone]    Patient reassessed at: 2105 5/26/23     Upon arrival to floor: cardiac monitor applied, patient oriented to room, call bell in reach, and bed in lowest position. Pt currently resting with restraints but not following commands, only looks at RN.     RN spoke with dtr(Alejandra) who stated pt lived in [place] and moved to [place] 3 years ago to family home while wife went to [place] with dtr. She stated he has been an alcoholic for about 20 yrs worsening within the last 3 yrs drinking 3-7 fifths/day of vodka or whisky. The only history she knew of was gout, recently a.fib, and he was supposed to have stent placed. Per dtr MD in [place] was Dr. Aguilar [phone]. She states he has multiple children which he is not in contact with and to call her with information on Mr. Correa.

## 2023-05-27 NOTE — PHARMACY MED REC
"Admission Medication History     The home medication history was taken by Carol Augustin CPhT.    Medication history obtained from, [facility]    You may go to "Admission" then "Reconcile Home Medications" tabs to review and/or act upon these items.     The home medication list has been updated by the Pharmacy department.   Please read ALL comments highlighted in yellow.   Please address this information as you see fit.    Feel free to contact us if you have any questions or require assistance.      The medications listed below were removed from the home medication list.  Please reorder if appropriate:  Patient reports no longer taking the following medication(s):  Atorvastatin 10 mg  Carvedilol 12.5 mg  HCTZ 12.5 mg  Xarelto 20 mg      Carol Augustin CPhT.  Ext [phone]               .        "

## 2023-05-27 NOTE — PLAN OF CARE
Problem: Adult Inpatient Plan of Care  Goal: Plan of Care Review  Outcome: Ongoing, Progressing     Problem: Fall Injury Risk  Goal: Absence of Fall and Fall-Related Injury  Outcome: Ongoing, Progressing     Problem: Restraint, Nonbehavioral (Nonviolent)  Goal: Absence of Harm or Injury  Outcome: Ongoing, Progressing     Problem: Seizure, Active Management  Goal: Absence of Seizure/Seizure-Related Injury  Outcome: Ongoing, Progressing     Problem: Alcohol Withdrawal  Goal: Alcohol Withdrawal Symptom Control  Outcome: Ongoing, Progressing     Problem: Acute Neurologic Deterioration (Alcohol Withdrawal)  Goal: Optimal Neurologic Function  Outcome: Ongoing, Progressing     Problem: Substance Misuse (Alcohol Withdrawal)  Goal: Readiness for Change Identified  Outcome: Ongoing, Progressing

## 2023-05-27 NOTE — ED NOTES
Assumed care of pt who will be admitted s/p new onset SZ. Pt is altered and does not verbally respond or follow commands but does show purposeful movement. Pt is in 4 point medical restraints for pt safety. Pt not redirectable and tries to climb out of bed and pull off/ out equipment. ABCs intact, NAD. No obvious major trauma. Unable to perform complete assessment d/t altered mental status.    Review of patient's allergies indicates:  No Known Allergies

## 2023-05-28 PROBLEM — F10.20 ALCOHOL USE DISORDER, SEVERE, DEPENDENCE: Status: ACTIVE | Noted: 2023-05-28

## 2023-05-28 LAB
ALBUMIN SERPL BCP-MCNC: 2.4 G/DL (ref 3.5–5.2)
ALP SERPL-CCNC: 98 U/L (ref 55–135)
ALT SERPL W/O P-5'-P-CCNC: 6 U/L (ref 10–44)
ANION GAP SERPL CALC-SCNC: 13 MMOL/L (ref 8–16)
AST SERPL-CCNC: 29 U/L (ref 10–40)
BACTERIA UR CULT: NORMAL
BASOPHILS # BLD AUTO: 0.06 K/UL (ref 0–0.2)
BASOPHILS NFR BLD: 0.4 % (ref 0–1.9)
BILIRUB SERPL-MCNC: 2.3 MG/DL (ref 0.1–1)
BUN SERPL-MCNC: 21 MG/DL (ref 8–23)
CALCIUM SERPL-MCNC: 7.7 MG/DL (ref 8.7–10.5)
CHLORIDE SERPL-SCNC: 106 MMOL/L (ref 95–110)
CK SERPL-CCNC: 270 U/L (ref 20–200)
CO2 SERPL-SCNC: 20 MMOL/L (ref 23–29)
CREAT SERPL-MCNC: 1.8 MG/DL (ref 0.5–1.4)
DIFFERENTIAL METHOD: ABNORMAL
EOSINOPHIL # BLD AUTO: 0.1 K/UL (ref 0–0.5)
EOSINOPHIL NFR BLD: 0.4 % (ref 0–8)
ERYTHROCYTE [DISTWIDTH] IN BLOOD BY AUTOMATED COUNT: 13.9 % (ref 11.5–14.5)
EST. GFR  (NO RACE VARIABLE): 38 ML/MIN/1.73 M^2
GLUCOSE SERPL-MCNC: 111 MG/DL (ref 70–110)
HCT VFR BLD AUTO: 39.1 % (ref 40–54)
HGB BLD-MCNC: 12.5 G/DL (ref 14–18)
IMM GRANULOCYTES # BLD AUTO: 0.08 K/UL (ref 0–0.04)
IMM GRANULOCYTES NFR BLD AUTO: 0.6 % (ref 0–0.5)
LYMPHOCYTES # BLD AUTO: 1.5 K/UL (ref 1–4.8)
LYMPHOCYTES NFR BLD: 11.1 % (ref 18–48)
MAGNESIUM SERPL-MCNC: 1.6 MG/DL (ref 1.6–2.6)
MCH RBC QN AUTO: 33.2 PG (ref 27–31)
MCHC RBC AUTO-ENTMCNC: 32 G/DL (ref 32–36)
MCV RBC AUTO: 104 FL (ref 82–98)
MONOCYTES # BLD AUTO: 0.7 K/UL (ref 0.3–1)
MONOCYTES NFR BLD: 4.8 % (ref 4–15)
NEUTROPHILS # BLD AUTO: 11.1 K/UL (ref 1.8–7.7)
NEUTROPHILS NFR BLD: 82.7 % (ref 38–73)
NRBC BLD-RTO: 0 /100 WBC
PLATELET # BLD AUTO: 125 K/UL (ref 150–450)
PMV BLD AUTO: 10.6 FL (ref 9.2–12.9)
POCT GLUCOSE: 120 MG/DL (ref 70–110)
POCT GLUCOSE: 126 MG/DL (ref 70–110)
POCT GLUCOSE: 144 MG/DL (ref 70–110)
POCT GLUCOSE: 166 MG/DL (ref 70–110)
POTASSIUM SERPL-SCNC: 3.4 MMOL/L (ref 3.5–5.1)
PROT SERPL-MCNC: 5.5 G/DL (ref 6–8.4)
RBC # BLD AUTO: 3.77 M/UL (ref 4.6–6.2)
SODIUM SERPL-SCNC: 139 MMOL/L (ref 136–145)
WBC # BLD AUTO: 13.48 K/UL (ref 3.9–12.7)

## 2023-05-28 PROCEDURE — 36410 VNPNXR 3YR/> PHY/QHP DX/THER: CPT

## 2023-05-28 PROCEDURE — 25000003 PHARM REV CODE 250

## 2023-05-28 PROCEDURE — 80053 COMPREHEN METABOLIC PANEL: CPT

## 2023-05-28 PROCEDURE — 83735 ASSAY OF MAGNESIUM: CPT | Performed by: STUDENT IN AN ORGANIZED HEALTH CARE EDUCATION/TRAINING PROGRAM

## 2023-05-28 PROCEDURE — 99900035 HC TECH TIME PER 15 MIN (STAT)

## 2023-05-28 PROCEDURE — 63600175 PHARM REV CODE 636 W HCPCS

## 2023-05-28 PROCEDURE — 63600175 PHARM REV CODE 636 W HCPCS: Performed by: STUDENT IN AN ORGANIZED HEALTH CARE EDUCATION/TRAINING PROGRAM

## 2023-05-28 PROCEDURE — 36415 COLL VENOUS BLD VENIPUNCTURE: CPT | Performed by: INTERNAL MEDICINE

## 2023-05-28 PROCEDURE — 85025 COMPLETE CBC W/AUTO DIFF WBC: CPT

## 2023-05-28 PROCEDURE — 11000001 HC ACUTE MED/SURG PRIVATE ROOM

## 2023-05-28 PROCEDURE — C1751 CATH, INF, PER/CENT/MIDLINE: HCPCS

## 2023-05-28 PROCEDURE — 94761 N-INVAS EAR/PLS OXIMETRY MLT: CPT

## 2023-05-28 PROCEDURE — 25000003 PHARM REV CODE 250: Performed by: STUDENT IN AN ORGANIZED HEALTH CARE EDUCATION/TRAINING PROGRAM

## 2023-05-28 PROCEDURE — S5010 5% DEXTROSE AND 0.45% SALINE: HCPCS

## 2023-05-28 PROCEDURE — 76937 US GUIDE VASCULAR ACCESS: CPT

## 2023-05-28 PROCEDURE — 82550 ASSAY OF CK (CPK): CPT | Performed by: INTERNAL MEDICINE

## 2023-05-28 PROCEDURE — 36415 COLL VENOUS BLD VENIPUNCTURE: CPT

## 2023-05-28 RX ORDER — MAGNESIUM SULFATE HEPTAHYDRATE 40 MG/ML
2 INJECTION, SOLUTION INTRAVENOUS ONCE
Status: COMPLETED | OUTPATIENT
Start: 2023-05-28 | End: 2023-05-28

## 2023-05-28 RX ADMIN — POTASSIUM BICARBONATE 20 MEQ: 391 TABLET, EFFERVESCENT ORAL at 10:05

## 2023-05-28 RX ADMIN — THIAMINE HYDROCHLORIDE 100 MG: 100 INJECTION, SOLUTION INTRAMUSCULAR; INTRAVENOUS at 10:05

## 2023-05-28 RX ADMIN — CEFTRIAXONE SODIUM 2 G: 2 INJECTION, POWDER, FOR SOLUTION INTRAMUSCULAR; INTRAVENOUS at 04:05

## 2023-05-28 RX ADMIN — LABETALOL HYDROCHLORIDE 10 MG: 5 INJECTION INTRAVENOUS at 12:05

## 2023-05-28 RX ADMIN — ENOXAPARIN SODIUM 100 MG: 100 INJECTION SUBCUTANEOUS at 09:05

## 2023-05-28 RX ADMIN — VALSARTAN 40 MG: 40 TABLET, FILM COATED ORAL at 09:05

## 2023-05-28 RX ADMIN — MAGNESIUM SULFATE 2 G: 2 INJECTION INTRAVENOUS at 10:05

## 2023-05-28 RX ADMIN — LORAZEPAM 2 MG: 2 INJECTION INTRAMUSCULAR; INTRAVENOUS at 09:05

## 2023-05-28 RX ADMIN — DEXTROSE AND SODIUM CHLORIDE: 5; .45 INJECTION, SOLUTION INTRAVENOUS at 03:05

## 2023-05-28 RX ADMIN — ENOXAPARIN SODIUM 100 MG: 100 INJECTION SUBCUTANEOUS at 08:05

## 2023-05-28 RX ADMIN — MUPIROCIN: 20 OINTMENT TOPICAL at 09:05

## 2023-05-28 RX ADMIN — MUPIROCIN: 20 OINTMENT TOPICAL at 08:05

## 2023-05-28 RX ADMIN — AZITHROMYCIN MONOHYDRATE 500 MG: 500 INJECTION, POWDER, LYOPHILIZED, FOR SOLUTION INTRAVENOUS at 09:05

## 2023-05-28 NOTE — PLAN OF CARE
Problem: Adult Inpatient Plan of Care  Goal: Plan of Care Review  Outcome: Ongoing, Progressing  Goal: Patient-Specific Goal (Individualized)  Outcome: Ongoing, Progressing  Goal: Absence of Hospital-Acquired Illness or Injury  Outcome: Ongoing, Progressing  Goal: Optimal Comfort and Wellbeing  Outcome: Ongoing, Progressing  Goal: Readiness for Transition of Care  Outcome: Ongoing, Progressing     Problem: Alcohol Withdrawal  Goal: Alcohol Withdrawal Symptom Control  Outcome: Ongoing, Progressing     Problem: Seizure, Active Management  Goal: Absence of Seizure/Seizure-Related Injury  Outcome: Ongoing, Progressing   Pt more lucid throughout shift A/Ox1-2 and follows commands. Afebrile overnight. VSS.  HTN episode with dose of home med and labetalol x1 to improve. Sats remained >90 on 2L NC, now on RA. No reports of pain during shift. D5 1/2NS @100 IVF continued. Alejandra(dtr) and Nolan(spouse) arrived early am at bedside with pt. Restraints d/c with education provided. Frequent checks for safety done during shift. Will report to oncoming RN.

## 2023-05-28 NOTE — PROGRESS NOTES
"Dejuan Correa is a 78 y.o. male patient presenting to [facility] with status epilepticus likely secondary to alcohol withdrawal. Plan as below.    There are no hospital problems to display for this patient.    Temp: 98 °F (36.7 °C) (05/28/23 0730)  Pulse: 85 (05/28/23 0900)  Resp: 19 (05/28/23 0900)  BP: (!) 174/80 (05/28/23 0900)  SpO2: 100 % (05/28/23 0900)  Weight: 105.5 kg (232 lb 9.4 oz) (05/28/23 0600)  Height: 5' 11" (180.3 cm) (05/26/23 2103)    Subjective  No further seizures since yesterday. Mentation significantly improved today compared to yesterday. Patient is awake, alert, talking, eating, and interacting with care team members and family in the room. Had a 6 beat run of NSVT overnight, no further episodes and no hemodynamic instability.    Objective:  General Appearance:  Comfortable, in no acute distress and not in pain.    Vital signs: (most recent): Blood pressure (!) 174/80, pulse 85, temperature 98 °F (36.7 °C), temperature source Oral, resp. rate 19, height 5' 11" (1.803 m), weight 105.5 kg (232 lb 9.4 oz), SpO2 100 %.  Vital signs are normal.    Output: Producing urine.    Lungs:  Normal effort and normal respiratory rate.  He is not in respiratory distress.  No rales, decreased breath sounds, wheezes or rhonchi.    Heart: Normal rate.  Regular rhythm.    Abdomen: Abdomen is soft and non-distended.  There is no abdominal tenderness.     Extremities: Normal range of motion.  There is no dependent edema.    Neurological: Patient is alert and oriented to person, place and time.    Skin:  Warm and dry.        Assessment & Plan    Alcohol withdrawal seizure, status (resolved)  Postictal encephalopathy (resolved)  Alcohol abuse and dependence  NSVT    - recommend psych eval to discuss severe alcohol dependence, consider initiation of meds for withdrawal and dependence, and to provide support services or other outpatient services to assist with addiction; patient's daughter, Alejandra, is a very strong " advocate for her father  - needs correction of electrolytes in setting of NSVT; keep K>4 and Mg >2  - PT consult to assist with patient mobility (significant debility reported at home)  - okay to step down to telemetry floor unit today    Seen and discussed with Dr. Syed. Critical care to sign off at this time.    Benedict Farrell MD  Pulmonary / Critical Care    Benedict Farrell MD  5/28/2023

## 2023-05-28 NOTE — PLAN OF CARE
Problem: Adult Inpatient Plan of Care  Goal: Plan of Care Review  Outcome: Ongoing, Progressing  Goal: Patient-Specific Goal (Individualized)  Outcome: Ongoing, Progressing  Goal: Absence of Hospital-Acquired Illness or Injury  Outcome: Ongoing, Progressing  Goal: Optimal Comfort and Wellbeing  Outcome: Ongoing, Progressing  Goal: Readiness for Transition of Care  Outcome: Ongoing, Progressing     Problem: Fall Injury Risk  Goal: Absence of Fall and Fall-Related Injury  Outcome: Ongoing, Progressing     Problem: Seizure, Active Management  Goal: Absence of Seizure/Seizure-Related Injury  Outcome: Ongoing, Progressing     Problem: Alcohol Withdrawal  Goal: Alcohol Withdrawal Symptom Control  Outcome: Ongoing, Progressing     Problem: Infection  Goal: Absence of Infection Signs and Symptoms  Outcome: Ongoing, Progressing

## 2023-05-28 NOTE — PROCEDURES
"Dejuan Correa is a 78 y.o. male patient.    Temp: 98 °F (36.7 °C) (05/28/23 0730)  Pulse: 85 (05/28/23 0900)  Resp: 19 (05/28/23 0900)  BP: (!) 174/80 (05/28/23 0900)  SpO2: 100 % (05/28/23 0900)  Weight: 105.5 kg (232 lb 9.4 oz) (05/28/23 0600)  Height: 5' 11" (180.3 cm) (05/26/23 2103)    PICC  Date/Time: 5/28/2023 9:51 AM  Consent Done: Yes  Time out: Immediately prior to procedure a time out was called to verify the correct patient, procedure, equipment, support staff and site/side marked as required  Indications: med administration and vascular access  Anesthesia: local infiltration  Local anesthetic: lidocaine 1% without epinephrine  Anesthetic Total (mL): 3  Preparation: skin prepped with ChloraPrep  Skin prep agent dried: skin prep agent completely dried prior to procedure  Sterile barriers: all five maximum sterile barriers used - cap, mask, sterile gown, sterile gloves, and large sterile sheet  Hand hygiene: hand hygiene performed prior to central venous catheter insertion  Location details: right brachial  Catheter type: single lumen  Catheter size: 4 Fr  Catheter Length: 11cm    Ultrasound guidance: yes  Vessel Caliber: medium and patent, compressibility normal  Vascular Doppler: not done  Needle advanced into vessel with real time Ultrasound guidance.  Guidewire confirmed in vessel.  Sterile sheath used.  no esophageal manometryNumber of attempts: 1  Post-procedure: chlorhexidine patch, blood return through all ports and sterile dressing applied  Estimated blood loss (mL): 0.5  Specimens: No  Implants: No  Assessment: successful placement  Complications: none        Name LUIS Collins  5/28/2023    "

## 2023-05-28 NOTE — NURSING
Pt trf via bed to room 520.  Kiana RN at bedside.  Pt attached to tele monitor.  All questions answered.  Siderails upx2, bed in lowest position and call light in reach.  All person belongings sent with patient.  Daughter informed of trf to floor.

## 2023-05-28 NOTE — PROGRESS NOTES
[facility] Medicine Progress Note    Primary Team: [facility] Hospitalist Team B  Attending Physician: Ferny Haas MD  Resident: Lisa/Billy  Intern: Sandra/Cathy    Subjective:      NAEON. Pt was alert eating on exam this morning. Mental status much improved, appropriately responsive to questions,. Family at bedside. No headache, chest pain, SOB. Tolerating full diet.      Objective:     Last 24 Hour Vital Signs:  BP  Min: 100/56  Max: 200/93  Temp  Av.3 °F (36.8 °C)  Min: 97.5 °F (36.4 °C)  Max: 99.4 °F (37.4 °C)  Pulse  Av.5  Min: 77  Max: 120  Resp  Av.7  Min: 10  Max: 30  SpO2  Av.6 %  Min: 94 %  Max: 100 %  Weight  Av.5 kg (232 lb 9.4 oz)  Min: 105.5 kg (232 lb 9.4 oz)  Max: 105.5 kg (232 lb 9.4 oz)  I/O last 3 completed shifts:  In: 5605.1 [P.O.:240; I.V.:1934.5; IV Piggyback:3430.6]  Out: 305 [Urine:305]    Physical Examination:  GGeneral: WNWD adult male sitting up in bed  HEENT: NC/AT, PERRL, EOMI, MMM  Neck: Supple, trachea midline  CV: Grossly normal to auscultation (difficult to appreciate d/t pt movement)  Resp: Grossly CTAB, on 2 L via NC  Abdominal: Soft, NT/ND  Extremities: 2+ pulses, ROM grossly intact, no BLE edema  Skin: Warm, dry, intact, no rash or erythema appreciated on exposed skin  Neuro: Alert, oriented       Laboratory:  Laboratory Data Reviewed: yes  Pertinent Findings:  Recent Labs   Lab 23  1547 23  0505 23  0431   WBC 26.26* 22.56* 13.48*   HGB 15.0 15.1 12.5*   HCT 48.0 44.3 39.1*    160 125*   * 98 104*   RDW 14.1 14.0 13.9    141 139   K 2.9* 3.1* 3.4*   CL 97 102 106   CO2 11* 26 20*   BUN 13 17 21   CREATININE 1.5* 1.6* 1.8*   * 149* 111*   PROT 6.7 6.2 5.5*   ALBUMIN 3.2* 2.9* 2.4*   BILITOT 1.8* 3.1* 2.3*   AST 24 27 29   ALKPHOS 155* 120 98   ALT <5* 6* 6*     Recent Labs   Lab 23  1547   TROPONINI <0.006   *        Microbiology Data Reviewed: yes  Pertinent Findings:  Microbiology Results  (last 7 days)       Procedure Component Value Units Date/Time    Blood culture x two cultures. Draw prior to antibiotics. [[id number]] Collected: 05/26/23 1654    Order Status: Completed Specimen: Blood from Peripheral, Antecubital, Left Updated: 05/28/23 0620     Blood Culture, Routine Gram stain aer bottle: Gram positive cocci in clusters resembling Staph      Results called to and read back by:Summer Mallory RN 05/27/2023  21:03      Gram stain stephy bottle: Gram positive cocci in clusters resembling Staph      Positive results previously called 05/27/2023  22:31    Narrative:      Aerobic and anaerobic    Blood culture x two cultures. Draw prior to antibiotics. [[id number]] Collected: 05/26/23 1630    Order Status: Completed Specimen: Blood from Peripheral, Antecubital, Left Updated: 05/28/23 0613     Blood Culture, Routine No Growth to date      No Growth to date    Narrative:      Aerobic and anaerobic    Urine culture [[id number]] Collected: 05/26/23 1605    Order Status: Completed Specimen: Urine Updated: 05/28/23 0151     Urine Culture, Routine No significant growth    Narrative:      Specimen Source->Urine    MRSA/SA Rapid ID by PCR from Blood culture [[id number]] Collected: 05/26/23 1654    Order Status: Completed Updated: 05/27/23 2231     Staph aureus ID by PCR Negative     Methicillin Resistant ID by PCR Negative    Narrative:      Aerobic and anaerobic             Other Results:  EKG (my interpretation): No new study since admission    Radiology Data Reviewed: yes  Pertinent Findings:  US Abdomen Limited   Final Result      Limited exam due to extensive bowel gas and limited mobility.      Gallbladder likely partially contracted.  No definite cholelithiasis.  No convincing evidence of acute cholecystitis.  No abnormal biliary duct dilatation.  Recommend clinical correlation.      Splenomegaly.         Electronically signed by: Naga Dao   Date:    05/27/2023   Time:    12:39      CT Head Without  Contrast   Final Result      Involutional changes of the right with no evidence of acute hemorrhage, mass or infarction.         Electronically signed by: Hipolito Shultz   Date:    05/26/2023   Time:    17:42      X-Ray Chest AP Portable   Final Result      1. Interstitial findings are accentuated by shallow inspiratory effort and habitus, mild edema is a consideration.  Correlation is advised.         Electronically signed by: Puneet Ochoa MD   Date:    05/26/2023   Time:    16:29           Current Medications:     Infusions:   dextrose 5 % and 0.45 % NaCl 100 mL/hr at 05/28/23 0344        Scheduled:   azithromycin  500 mg Intravenous Daily    cefTRIAXone (ROCEPHIN) IVPB  2 g Intravenous Daily    cloNIDine 0.1 mg/24 hr td ptwk  1 patch Transdermal Q7 Days    enoxparin  1 mg/kg Subcutaneous Q12H (prophylaxis, 0900/2100)    mupirocin   Nasal BID    thiamine (VITAMIN B1) IVPB  100 mg Intravenous Daily    valsartan  40 mg Oral QHS        PRN:  diazePAM, labetalol, lorazepam, melatonin, sodium chloride 0.9%    Antibiotics and Day Number of Therapy:  Antibiotics (72h ago, onward)      Start     Stop Route Frequency Ordered    05/27/23 1700  cefTRIAXone (ROCEPHIN) 2 g in dextrose 5 % in water (D5W) 5 % 50 mL IVPB (MB+)         -- IV Daily 05/26/23 2138 05/26/23 2245  mupirocin 2 % ointment         05/31 2059 Nasl 2 times daily 05/26/23 2142 05/26/23 2145  azithromycin (ZITHROMAX) 500 mg in dextrose 5 % (D5W) 250 mL IVPB (Vial-Mate)         -- IV Daily 05/26/23 2138             Lines and Day Number of Therapy:  PIV       Assessment and Plan:     Dejuan Correa is a 78 y.o. male w/ PMHx of pAfib, HTN, HLD, and EtOh Use Disorder who presented to [facility] on 5/26/2023 with a primary complaint of seizure-like activity around 2:00 pm day of admission. He was admitted to [facility] for acute encephalopathy / seizure-like activity. Pt has not demonstrated any further seizure-like activity, mental  status greatly improved on exam today. Will contiue to monitor for withdrawls and continue CAP coverage.      Acute Encephalopathy- Improved  Seizure like Activity   Severe ETOH use disorder  Found at approximately 2:00 pm down and in what appeared to be a convulsive episode. At home. Reportedly had 3 events. Per daughter was first seizure. Drinks 3-7 fifths of vodka daily. EtOH level < 10  Etiologies include EtOH w/d vs hypoglycemia vs infection  - Continue Azithro and CTX for possible aspiration event during seizure  - Ativan 2 mg q4h prn for anxiety or CIWA > 8  - Diazepam 10 mg q6h prn for seizures or EtOH w/d  - Plan to transfer to [facility] if experiences another seizure for EEG  - Psych consulted, appreciate recs  - Continue to closely monitor vitals  - Thiamine level pending  - Thiamine 100 mg daily x 5 days     Acute Hypoxic Respiratory Failure - Resolved  Community acquired Pneumonia  Pt seen w/ NRB on 15 L, resolved weaned to room air  Plan:  - continue CAP treatment     Hypokalemia  K 2.9 on CMP in ED, improved.   - Continue to monitor on morning labs     Afib  Hx per chart review. Currently NSR.  Plan:  - Monitor on Tele  - Continue to monitor for symptoms     Hypertensive Urgency - resolved  HTN - chronic  Per chart review, pt takes Valsartan 40 mg daily at home. He was normotensive at initial presentation but shortly thereafter became hypertensive to 224/117.  Plan:  - s/p Labetalol 20 mg IV in ED  - Clonidine 0.1 mg 24 hr patch  - Labetalol 10 mg IV q4h PRN for SPB > 180  - Continue Valsartan when pt is able  - Continue to routinely check vitals     HLD  Per chart review. No apparent home meds.  Plan:  - Lipid panel demonstrated: decreased total cholesterol and HDL w/ normal triglycerides and LDL  - Can start statin therapy as appropriate        Code Status: Full code  VTE Ppx: Lovenox  Diet: NPO     Disposition: Pending Psych recommendations and U/S Abd    Davida Cervantes MD  [facility]  IM/Peds PGY3/HO2  [facility] Hospital Medicine Team B    [facility] Hospitalist Pager numbers:   [facility] Hospitalist Medicine Team A (Anila/Priscilla): [phone]  [facility] Hospitalist Medicine Team B (Reji/Samina):  [phone]

## 2023-05-29 ENCOUNTER — TELEPHONE (OUTPATIENT)
Dept: FAMILY MEDICINE | Facility: CLINIC | Age: 78
End: 2023-05-29
Payer: MEDICARE

## 2023-05-29 VITALS
DIASTOLIC BLOOD PRESSURE: 93 MMHG | HEART RATE: 88 BPM | HEIGHT: 71 IN | WEIGHT: 232.56 LBS | RESPIRATION RATE: 24 BRPM | SYSTOLIC BLOOD PRESSURE: 166 MMHG | BODY MASS INDEX: 32.56 KG/M2 | TEMPERATURE: 98 F | OXYGEN SATURATION: 95 %

## 2023-05-29 PROBLEM — F10.931 ALCOHOL WITHDRAWAL SEIZURE WITH DELIRIUM: Status: ACTIVE | Noted: 2023-05-29

## 2023-05-29 PROBLEM — R56.9 ALCOHOL WITHDRAWAL SEIZURE WITH DELIRIUM: Status: ACTIVE | Noted: 2023-05-29

## 2023-05-29 LAB
ALBUMIN SERPL BCP-MCNC: 2.6 G/DL (ref 3.5–5.2)
ALP SERPL-CCNC: 94 U/L (ref 55–135)
ALT SERPL W/O P-5'-P-CCNC: 5 U/L (ref 10–44)
ANION GAP SERPL CALC-SCNC: 12 MMOL/L (ref 8–16)
AST SERPL-CCNC: 19 U/L (ref 10–40)
BACTERIA BLD CULT: ABNORMAL
BASOPHILS # BLD AUTO: 0.06 K/UL (ref 0–0.2)
BASOPHILS NFR BLD: 0.6 % (ref 0–1.9)
BILIRUB SERPL-MCNC: 1.5 MG/DL (ref 0.1–1)
BUN SERPL-MCNC: 20 MG/DL (ref 8–23)
CALCIUM SERPL-MCNC: 8.1 MG/DL (ref 8.7–10.5)
CHLORIDE SERPL-SCNC: 104 MMOL/L (ref 95–110)
CO2 SERPL-SCNC: 27 MMOL/L (ref 23–29)
CREAT SERPL-MCNC: 1.7 MG/DL (ref 0.5–1.4)
DIFFERENTIAL METHOD: ABNORMAL
EOSINOPHIL # BLD AUTO: 0.3 K/UL (ref 0–0.5)
EOSINOPHIL NFR BLD: 2.6 % (ref 0–8)
ERYTHROCYTE [DISTWIDTH] IN BLOOD BY AUTOMATED COUNT: 14 % (ref 11.5–14.5)
EST. GFR  (NO RACE VARIABLE): 41 ML/MIN/1.73 M^2
GLUCOSE SERPL-MCNC: 92 MG/DL (ref 70–110)
HCT VFR BLD AUTO: 35.2 % (ref 40–54)
HGB BLD-MCNC: 11.6 G/DL (ref 14–18)
IMM GRANULOCYTES # BLD AUTO: 0.04 K/UL (ref 0–0.04)
IMM GRANULOCYTES NFR BLD AUTO: 0.4 % (ref 0–0.5)
LYMPHOCYTES # BLD AUTO: 1.4 K/UL (ref 1–4.8)
LYMPHOCYTES NFR BLD: 13.6 % (ref 18–48)
MCH RBC QN AUTO: 33.8 PG (ref 27–31)
MCHC RBC AUTO-ENTMCNC: 33 G/DL (ref 32–36)
MCV RBC AUTO: 103 FL (ref 82–98)
MONOCYTES # BLD AUTO: 0.5 K/UL (ref 0.3–1)
MONOCYTES NFR BLD: 4.9 % (ref 4–15)
NEUTROPHILS # BLD AUTO: 7.9 K/UL (ref 1.8–7.7)
NEUTROPHILS NFR BLD: 77.9 % (ref 38–73)
NRBC BLD-RTO: 0 /100 WBC
PLATELET # BLD AUTO: 156 K/UL (ref 150–450)
PMV BLD AUTO: 10.7 FL (ref 9.2–12.9)
POTASSIUM SERPL-SCNC: 3.4 MMOL/L (ref 3.5–5.1)
PROT SERPL-MCNC: 5.2 G/DL (ref 6–8.4)
RBC # BLD AUTO: 3.43 M/UL (ref 4.6–6.2)
SODIUM SERPL-SCNC: 143 MMOL/L (ref 136–145)
WBC # BLD AUTO: 10.18 K/UL (ref 3.9–12.7)

## 2023-05-29 PROCEDURE — 25000003 PHARM REV CODE 250: Performed by: STUDENT IN AN ORGANIZED HEALTH CARE EDUCATION/TRAINING PROGRAM

## 2023-05-29 PROCEDURE — 99223 1ST HOSP IP/OBS HIGH 75: CPT | Mod: ,,, | Performed by: PSYCHIATRY & NEUROLOGY

## 2023-05-29 PROCEDURE — 63600175 PHARM REV CODE 636 W HCPCS: Performed by: STUDENT IN AN ORGANIZED HEALTH CARE EDUCATION/TRAINING PROGRAM

## 2023-05-29 PROCEDURE — 90833 PSYTX W PT W E/M 30 MIN: CPT | Mod: ,,, | Performed by: PSYCHIATRY & NEUROLOGY

## 2023-05-29 PROCEDURE — 63600175 PHARM REV CODE 636 W HCPCS

## 2023-05-29 PROCEDURE — 85025 COMPLETE CBC W/AUTO DIFF WBC: CPT

## 2023-05-29 PROCEDURE — 94761 N-INVAS EAR/PLS OXIMETRY MLT: CPT

## 2023-05-29 PROCEDURE — 80053 COMPREHEN METABOLIC PANEL: CPT

## 2023-05-29 PROCEDURE — 25000003 PHARM REV CODE 250

## 2023-05-29 PROCEDURE — 97162 PT EVAL MOD COMPLEX 30 MIN: CPT

## 2023-05-29 PROCEDURE — 97530 THERAPEUTIC ACTIVITIES: CPT

## 2023-05-29 PROCEDURE — 90833 PR PSYCHOTHERAPY W/PATIENT W/E&M, 30 MIN (ADD ON): ICD-10-PCS | Mod: ,,, | Performed by: PSYCHIATRY & NEUROLOGY

## 2023-05-29 PROCEDURE — 99223 PR INITIAL HOSPITAL CARE,LEVL III: ICD-10-PCS | Mod: ,,, | Performed by: PSYCHIATRY & NEUROLOGY

## 2023-05-29 RX ORDER — CARVEDILOL 6.25 MG/1
6.25 TABLET ORAL 2 TIMES DAILY
Status: DISCONTINUED | OUTPATIENT
Start: 2023-05-29 | End: 2023-05-29

## 2023-05-29 RX ORDER — LOPERAMIDE HYDROCHLORIDE 2 MG/1
2 CAPSULE ORAL ONCE AS NEEDED
Status: COMPLETED | OUTPATIENT
Start: 2023-05-29 | End: 2023-05-29

## 2023-05-29 RX ORDER — MAGNESIUM SULFATE HEPTAHYDRATE 40 MG/ML
2 INJECTION, SOLUTION INTRAVENOUS ONCE
Status: COMPLETED | OUTPATIENT
Start: 2023-05-29 | End: 2023-05-29

## 2023-05-29 RX ORDER — CARVEDILOL 12.5 MG/1
12.5 TABLET ORAL 2 TIMES DAILY
Status: DISCONTINUED | OUTPATIENT
Start: 2023-05-29 | End: 2023-05-29 | Stop reason: HOSPADM

## 2023-05-29 RX ORDER — CARVEDILOL 12.5 MG/1
12.5 TABLET ORAL 2 TIMES DAILY
Status: DISCONTINUED | OUTPATIENT
Start: 2023-05-29 | End: 2023-05-29

## 2023-05-29 RX ORDER — LEVOFLOXACIN 750 MG/1
750 TABLET ORAL DAILY
Qty: 2 TABLET | Refills: 0 | Status: SHIPPED | OUTPATIENT
Start: 2023-05-29 | End: 2023-05-31

## 2023-05-29 RX ADMIN — LOPERAMIDE HYDROCHLORIDE 2 MG: 2 CAPSULE ORAL at 05:05

## 2023-05-29 RX ADMIN — POTASSIUM BICARBONATE 50 MEQ: 978 TABLET, EFFERVESCENT ORAL at 11:05

## 2023-05-29 RX ADMIN — MUPIROCIN: 20 OINTMENT TOPICAL at 09:05

## 2023-05-29 RX ADMIN — THIAMINE HYDROCHLORIDE 100 MG: 100 INJECTION, SOLUTION INTRAMUSCULAR; INTRAVENOUS at 09:05

## 2023-05-29 RX ADMIN — ENOXAPARIN SODIUM 100 MG: 100 INJECTION SUBCUTANEOUS at 09:05

## 2023-05-29 RX ADMIN — CARVEDILOL 6.25 MG: 6.25 TABLET, FILM COATED ORAL at 01:05

## 2023-05-29 RX ADMIN — LABETALOL HYDROCHLORIDE 10 MG: 5 INJECTION INTRAVENOUS at 09:05

## 2023-05-29 RX ADMIN — DIAZEPAM 10 MG: 10 INJECTION, SOLUTION INTRAMUSCULAR; INTRAVENOUS at 02:05

## 2023-05-29 RX ADMIN — CARVEDILOL 12.5 MG: 12.5 TABLET, FILM COATED ORAL at 08:05

## 2023-05-29 RX ADMIN — MAGNESIUM SULFATE 2 G: 2 INJECTION INTRAVENOUS at 05:05

## 2023-05-29 RX ADMIN — LABETALOL HYDROCHLORIDE 10 MG: 5 INJECTION INTRAVENOUS at 01:05

## 2023-05-29 RX ADMIN — Medication 6 MG: at 01:05

## 2023-05-29 NOTE — PLAN OF CARE
CM met with pt, daughter Alejnadra  [phone] and wife Nolan  [phone]  per James   daughter and wife live in [place]   - they are en route there today - will not take pt home at [place]     Pt lives with is brother Sunny - will need transportation to home   for d/c  to home today WC van booked for 2pm     RW ordered - to be delivered to pt   Accepted per Marcia - [facility]  RP will see pt post d/c       Future Appointments   Date Time Provider Department Center   6/14/2023  1:30 PM Luzmaria Ribeiro MD [facility]     f/u apt made per LUIS Reyes CM sent a message per  Princess [facility]          05/29/23 1247   Discharge Assessment   Assessment Type Discharge Planning Assessment   Confirmed/corrected address, phone number and insurance Yes   Confirmed Demographics Correct on Facesheet   Source of Information patient;family;health record   When was your last doctors appointment?   (pt's last MD is in [place] - no local pcp)   Communicated LIZ with patient/caregiver Yes   Reason For Admission AMS   People in Home sibling(s)  (brother Sunny  [phone])   Do you expect to return to your current living situation? Yes   Do you have help at home or someone to help you manage your care at home? Yes   Who are your caregiver(s) and their phone number(s)? brother Sunny   Prior to hospitilization cognitive status: Alert/Oriented   Current cognitive status: Alert/Oriented   Walking or Climbing Stairs ambulation difficulty, requires equipment   Equipment Currently Used at Home cane, quad   Readmission within 30 days? No   Patient currently being followed by outpatient case management? No   Do you currently have service(s) that help you manage your care at home? No   Do you have prescription coverage?   ([facility] [place])   How do you get to doctors appointments? car, drives self;family or friend will provide   Are you on dialysis? No   Do you take coumadin? No    Discharge Plan A Home;Home with family;Home Health   DME Needed Upon Discharge  walker, rolling   Discharge Plan discussed with: Patient;Spouse/sig other;Adult children   Name(s) and Number(s) Wife Nolan Correa ;  daughter Alejandra  - en route to [place] - they don't live in [place]   Transition of Care Barriers None

## 2023-05-29 NOTE — PLAN OF CARE
05/29/23 0146   Patient Request   Patient Requested AI alert   Nurse Notification   Charge Nurse Notified? Yes   Name of Charge Nurse Anita Egan RN   Bedside Nurse Notified? Yes   Name of Bedside Nurse Abbie Aguillon RN   Nurse Notfication Method Secure Chat   Nurse Notified Of Other  (AI alert)   Provider Notification   Provider Notified? Yes  (Notified per Abbie Aguillon RN)   Name of Provider Criselda Freed

## 2023-05-29 NOTE — DISCHARGE SUMMARY
[facility] Hospital Medicine Discharge Summary    Primary Team: [facility] Hospitalist Team B  Attending Physician: Ferny Haas MD  Resident: Lisa  Intern: Sandra    Date of Admit: 5/26/2023  Date of Discharge: 5/29/2023    Discharge to: home w/ brother and home health  Condition: stable    Discharge Diagnoses     Patient Active Problem List   Diagnosis    PAF (paroxysmal atrial fibrillation)    PSVT (paroxysmal supraventricular tachycardia)    Primary hypertension    HLD (hyperlipidemia)    Alcohol use disorder, severe, dependence    Alcohol withdrawal seizure with delirium       Consultants and Procedures     Consultants:  Pulm/Crit, Psych    Procedures:   None    Imaging:  US Abdomen Limited   Final Result      Limited exam due to extensive bowel gas and limited mobility.      Gallbladder likely partially contracted.  No definite cholelithiasis.  No convincing evidence of acute cholecystitis.  No abnormal biliary duct dilatation.  Recommend clinical correlation.      Splenomegaly.         Electronically signed by: Naga Dao   Date:    05/27/2023   Time:    12:39      CT Head Without Contrast   Final Result      Involutional changes of the right with no evidence of acute hemorrhage, mass or infarction.         Electronically signed by: Hipolito Shultz   Date:    05/26/2023   Time:    17:42      X-Ray Chest AP Portable   Final Result      1. Interstitial findings are accentuated by shallow inspiratory effort and habitus, mild edema is a consideration.  Correlation is advised.         Electronically signed by: Puneet Ochoa MD   Date:    05/26/2023   Time:    16:29            Brief History of Present Illness      Dejuan Correa is a 78 y.o. male w/ PMHx of pAfib, HTN, HLD, and EtOh Use Disorder who presented to [facility] on 5/26/2023 with a primary complaint of seizure-like activity around 2:00 pm day of admission. He was admitted to [facility] for acute encephalopathy / seizure-like  "activity. Pt has not demonstrated any further seizure-like activity, mental status greatly improved on exam yesterday. Presentation likely EtOH withdrawal seizure. Pt refused EtOH rehab services. Discharging w/ home health PT and skilled nursing as well as a rolling walker.    For the full HPI please refer to the History & Physical from this admission.    Hospital Course By Problem with Pertinent Findings     Acute Encephalopathy, improved  Seizure like Activity   Severe ETOH use disorder  Found at approximately 2:00 pm down and in what appeared to be a convulsive episode. At home. Reportedly had 3 events. Per daughter was first seizure. Drinks 3-7 "fifths" of vodka daily. EtOH level < 10  Etiologies include EtOH w/d vs hypoglycemia vs infection  - Continue Azithro and CTX for possible aspiration event during seizure  - Ativan 2 mg q4h prn for anxiety or CIWA > 8  - Diazepam 10 mg q6h prn for seizures or EtOH w/d  - Plan to transfer to [facility] if experiences another seizure for EEG  - Psych consulted, appreciate recs  - Continue to closely monitor vitals  - Thiamine level still pending  - Thiamine 100 mg daily x 5 days  - Pt currently denying heavy EtOH use and not agreeable to rehab at this time  - Home health for PT and skilled nursing at discharge     Acute Hypoxic Respiratory Failure, resolved  Community acquired Pneumonia, suspected  Pt seen w/ NRB on 15 L, resolved weaned to room air  Plan:  - Etiology likely CAP 2/2 aspiration during seizure event  - Azithromycin and CTX for CAP coverage  - Transition to PO Levafloxacin to finish 5 day course for CAP coverage  - Rx for Levafloxacin given at discharge     Hypokalemia  K 2.9 on CMP in ED, improved.   - Continue to monitor on morning labs  - 3.4 this morning, repleted     Demetrius Marlow per chart review. Currently NSR.  Plan:  - Monitor on Tele  - Continue to monitor for symptoms  - Resume all home meds upon discharge     Hypertensive Urgency - " resolved  HTN - chronic  Per chart review, pt takes Valsartan 40 mg daily at home. He was normotensive at initial presentation but shortly thereafter became hypertensive to 224/117.  Plan:  - s/p Labetalol 20 mg IV in ED  - Clonidine 0.1 mg 24 hr patch  - Labetalol 10 mg IV q4h PRN for SPB > 180  - Resumed Coreg at 6.25 mg yesterday, increased to home dose of 12.5 mg today  - Continue Valsartan when pt is able  - Continue to routinely check vitals  - Resume home meds at discharge     HLD  Per chart review. No apparent home meds.  Plan:  - Lipid panel demonstrated: decreased total cholesterol and HDL w/ normal triglycerides and LDL  - Can start statin therapy as appropriate    Discharge Medications        Medication List        START taking these medications      levoFLOXacin 750 MG tablet  Commonly known as: LEVAQUIN  Take 1 tablet (750 mg total) by mouth once daily. for 2 days            CONTINUE taking these medications      carvediloL 12.5 MG tablet  Commonly known as: COREG     hydroCHLOROthiazide 12.5 MG Tab  Commonly known as: HYDRODIURIL     valsartan 40 MG tablet  Commonly known as: DIOVAN  TAKE 1 TABLET (40 MG TOTAL) BY MOUTH ONCE DAILY. PT STATED TAKING 320MG ONCE DAILY     XARELTO ORAL               Where to Get Your Medications        These medications were sent to [facility]  [address], [place] [address]      Hours: Mon-Fri, 8a-5:30p Phone: [phone]   levoFLOXacin 750 MG tablet         Discharge Information:   Diet:   Diet Order    None          Physical Activity:  As tolerated w/ fall precautions             Instructions:  1. Take all medications as prescribed  2. Keep all follow-up appointments  3. Return to the hospital or call your primary care physicians if any worsening symptoms such as fever, chest pain, shortness of breath, return of symptoms, or any other concerns.    Follow-Up Appointments:  PCP      Sunny Flores,   [facility], PGY-I  [facility]  Hospital Medicine Team B

## 2023-05-29 NOTE — PLAN OF CARE
For d/c to home today    van  set for 2pm   RW avani Witt Leigh is working on order     Future Appointments   Date Time Provider Department Center   6/14/2023  1:30 PM Luzmaria Ribeiro MD [facility]     Discharging nurse to review all d/c meds/instructions        05/29/23 1257   Final Note   Assessment Type Final Discharge Note   Anticipated Discharge Disposition Home-Health  ([facility])   What phone number can be called within the next 1-3 days to see how you are doing after discharge? [phone]   Hospital Resources/Appts/Education Provided Appointments scheduled and added to AVS   Post-Acute Status   Post-Acute Authorization Home Health   Home Health Status Set-up Complete/Auth obtained   Discharge Delays None known at this time

## 2023-05-29 NOTE — PROGRESS NOTES
[facility]                    Pharmacy       Discharge Medication Education    Patient ACCEPTED medication education. Pharmacy has provided education on the name, indication, and possible side effects of the medication(s) prescribed, using teach-back method.     The following medications have also been discussed, during this admission.        Medication List        START taking these medications      levoFLOXacin 750 MG tablet  Commonly known as: LEVAQUIN  Take 1 tablet (750 mg total) by mouth once daily. for 2 days            CONTINUE taking these medications      carvediloL 12.5 MG tablet  Commonly known as: COREG     hydroCHLOROthiazide 12.5 MG Tab  Commonly known as: HYDRODIURIL     valsartan 40 MG tablet  Commonly known as: DIOVAN  TAKE 1 TABLET (40 MG TOTAL) BY MOUTH ONCE DAILY. PT STATED TAKING 320MG ONCE DAILY     XARELTO ORAL               Where to Get Your Medications        These medications were sent to [facility]  [address], [place] [address]      Hours: Mon-Fri, 8a-5:30p Phone: [phone]   levoFLOXacin 750 MG tablet          Thank you  Cassandra Garber, PharmD  [phone]

## 2023-05-29 NOTE — PLAN OF CARE
[facility]      HOME HEALTH ORDERS  FACE TO FACE ENCOUNTER    Patient Name: Dejuan Correa  YOB: 1945    PCP: Joe De Jesus Jr, MD   PCP Address: [address] / [place] [address]  PCP Phone Number: [phone]  PCP Fax: [phone]    Encounter Date: 5/26/23    Admit to Home Health    Diagnoses:  Active Hospital Problems    Diagnosis  POA    Alcohol use disorder, severe, dependence [F10.20]  Unknown      Resolved Hospital Problems   No resolved problems to display.       Follow Up Appointments:  No future appointments.    Allergies:Review of patient's allergies indicates:  No Known Allergies    Medications: Review discharge medications with patient and family and provide education.    Current Facility-Administered Medications   Medication Dose Route Frequency Provider Last Rate Last Admin    azithromycin (ZITHROMAX) 500 mg in dextrose 5 % (D5W) 250 mL IVPB (Vial-Mate)  500 mg Intravenous Daily Sunny Flores MD   Stopped at 05/28/23 2226    carvediloL tablet 12.5 mg  12.5 mg Oral BID Sunny Flores MD   12.5 mg at 05/29/23 0859    cefTRIAXone (ROCEPHIN) 2 g in dextrose 5 % in water (D5W) 5 % 50 mL IVPB (MB+)  2 g Intravenous Daily Sunny Flores MD   Stopped at 05/28/23 1654    cloNIDine 0.1 mg/24 hr td ptwk 1 patch  1 patch Transdermal Q7 Days Emiliano Gonzalez MD   1 patch at 05/27/23 1227    diazePAM injection 10 mg  10 mg Intravenous Q6H PRN Sunny Flores MD   10 mg at 05/29/23 0245    enoxaparin injection 100 mg  1 mg/kg Subcutaneous Q12H (prophylaxis, 0900/2100) Sunny Flores MD   100 mg at 05/29/23 0911    labetaloL injection 10 mg  10 mg Intravenous Q4H PRN Sunny Flores MD   10 mg at 05/29/23 0906    LORazepam injection 2 mg  2 mg Intravenous Q4H PRN Emiliano Gonzalez MD   2 mg at 05/28/23 2107    melatonin tablet 6 mg  6 mg Oral Nightly PRN Sunny Flores MD   6 mg at 05/29/23 0154    mupirocin 2 % ointment   Nasal BID Ferny Haas MD   Given  at 05/29/23 0900    potassium bicarbonate disintegrating tablet 50 mEq  50 mEq Oral Once Emiliano Gonzalez MD        sodium chloride 0.9% flush 10 mL  10 mL Intravenous PRN Sunny Flores MD        thiamine (B-1) 100 mg in dextrose 5 % (D5W) 100 mL IVPB  100 mg Intravenous Daily Emiliano Gonzalez MD   Stopped at 05/29/23 0934    valsartan tablet 40 mg  40 mg Oral QHS Linda Fitzpatrick MD   40 mg at 05/28/23 2101     Current Discharge Medication List        CONTINUE these medications which have NOT CHANGED    Details   carvediloL (COREG) 12.5 MG tablet Take 12.5 mg by mouth Daily.      hydroCHLOROthiazide (HYDRODIURIL) 12.5 MG Tab Take 12.5 mg by mouth once daily.      rivaroxaban (XARELTO ORAL) Take 20 mg by mouth Daily.      valsartan (DIOVAN) 40 MG tablet TAKE 1 TABLET (40 MG TOTAL) BY MOUTH ONCE DAILY. PT STATED TAKING 320MG ONCE DAILY  Qty: 90 tablet, Refills: 1               I have seen and examined this patient within the last 30 days. My clinical findings that support the need for the home health skilled services and home bound status are the following:no   Weakness/numbness causing balance and gait disturbance due to Weakness/Debility making it taxing to leave home.  Requiring assistive device to leave home due to unsteady gait caused by  Weakness/Debility.     Diet:   cardiac diet    Labs:  None    Referrals/ Consults  Physical Therapy to evaluate and treat. Evaluate for home safety and equipment needs; Establish/upgrade home exercise program. Perform / instruct on therapeutic exercises, gait training, transfer training, and Range of Motion.    Activities:   activity as tolerated    Nursing:   Agency to admit patient within 24 hours of hospital discharge unless specified on physician order or at patient request    SN to complete comprehensive assessment including routine vital signs. Instruct on disease process and s/s of complications to report to MD. Review/verify medication list sent home with  the patient at time of discharge  and instruct patient/caregiver as needed. Frequency may be adjusted depending on start of care date.     Skilled nurse to perform up to 3 visits PRN for symptoms related to diagnosis    Notify MD if SBP > 160 or < 90; DBP > 90 or < 50; HR > 120 or < 50; Temp > 101; O2 < 88%    Ok to schedule additional visits based on staff availability and patient request on consecutive days within the home health episode.    When multiple disciplines ordered:    Start of Care occurs on Sunday - Wednesday schedule remaining discipline evaluations as ordered on separate consecutive days following the start of care.    Thursday SOC -schedule subsequent evaluations Friday and Monday the following week.     Friday - Saturday SOC - schedule subsequent discipline evaluations on consecutive days starting Monday of the following week.    For all post-discharge communication and subsequent orders please contact patient's primary care physician. If unable to reach primary care physician or do not receive response within 30 minutes, please contact [facility] on call for clinical staff order clarification    Miscellaneous   None    Home Health Aide:  Nursing Weekly    Wound Care Orders  no    I certify that this patient is confined to his home and needs intermittent skilled nursing care and physical therapy.

## 2023-05-29 NOTE — NURSING
MD Carson was made aware of patient in regards episodes of 6 beats running Vtach   multiple times. Patient is Afib in the monitor. BP was elevated as well. PT came and checked the patient at the bedside.

## 2023-05-29 NOTE — PLAN OF CARE
Problem: Adult Inpatient Plan of Care  Goal: Absence of Hospital-Acquired Illness or Injury  Outcome: Ongoing, Progressing     Problem: Fall Injury Risk  Goal: Absence of Fall and Fall-Related Injury  Outcome: Ongoing, Progressing     Problem: Substance Misuse (Alcohol Withdrawal)  Goal: Readiness for Change Identified  Outcome: Ongoing, Progressing   Nigjhtly medications and prn administered per MAR. Multiple episodes of Vtach. X4 BM loose, brown color nonodorous. Condom catheter in place. Telemonitoring in progress run ST and Episodes of V tach. 140ish then goes down to 90-bpm. Safety precautions secured. Labs are being monitor.Will continue to monitor.

## 2023-05-29 NOTE — NURSING
RAPID RESPONSE NURSE PROACTIVE ROUNDING NOTE       Time of Visit: 0800    Admit Date: 2023  LOS: 3  Code Status: Full Code   Date of Visit: 2023  : 1945  Age: 78 y.o.  Sex: male  Race: Black or   Bed: K510/K510 A:   MRN: [id number]  Was the patient discharged from an ICU this admission? Yes   Was the patient discharged from a PACU within last 24 hours? No   Did the patient receive conscious sedation/general anesthesia in last 24 hours? No   Was the patient in the ED within the past 24 hours? No   Was the patient on NIPPV within the past 24 hours? No   Attending Physician: Ferny Haas MD  Primary Service: [facility]PITAL[facility] TEAM B   Time spent at the bedside: < 15 min    SITUATION    Notified by Epic patient alert  Reason for alert: Proactive rounds    Diagnosis: <principal problem not specified>   has a past medical history of Atrial fibrillation, Hypertension, Irregular heart beat, and Supraventricular tachycardia.    Last Vitals:  Temp: 98.3 °F (36.8 °C) (712)  Pulse: 85 (712)  Resp: 20 (712)  BP: 136/88 (712)  SpO2: 97 % (739)    24 Hour Vitals Range:  Temp:  [97 °F (36.1 °C)-98.6 °F (37 °C)]   Pulse:  []   Resp:  [18-25]   BP: (114-191)/(57-89)   SpO2:  [95 %-100 %]     Clinical Issues:  vtac    ASSESSMENT/INTERVENTIONS    Neuro and cardiac    RECOMMENDATIONS  Replace mg    Discussed plan of care with bedside RNSavanah    PROVIDER ESCALATION    Physician escalation: Yes    Orders received and case discussed with Dr. Morgan .    Disposition:Remain in room      FOLLOW UP    Call back the Rapid Response Nurse, Jaja Mcmahan at [phone] for additional questions or concerns.

## 2023-05-29 NOTE — TELEPHONE ENCOUNTER
I currently do not have a new pt appt until next year given we have taken on a large influx of new pt's from some of the private practices that have closed in this area. Please have the pt contact the \[facility]\"" [facility] to help follow up with his care

## 2023-05-29 NOTE — NURSING
Pt had 6 beat run of V tach x2 this morning. BP elevated. IV labetalol and Coreg given. MD at bedsided. Will continue to monitor.

## 2023-05-29 NOTE — PROGRESS NOTES
[facility] Medicine Progress Note    Primary Team: [facility] Hospitalist Team B  Attending Physician: Ferny Haas MD  Resident: Lisa  Intern: Sandra    Subjective:      VSS. Nursing staff reports pt has had multiple brief runs of V-tach (6 beats). Pt was seen at bedside resting comfortably. He denies experiencing any fever, chills, abdominal pain, nausea, vomiting, chest pain, palpitations, headache or dizziness at this time.    He states that he only consumes approximately 2 drinks per day that consist of either [place] or vodka mixed w/ 7up. He denies heavy alcohol use and states that so much alcohol is consumed at his house because he has friends that come over and drink.     Objective:     Last 24 Hour Vital Signs:  BP  Min: 114/57  Max: 191/87  Temp  Av °F (36.7 °C)  Min: 97 °F (36.1 °C)  Max: 98.6 °F (37 °C)  Pulse  Av.8  Min: 79  Max: 101  Resp  Av.7  Min: 17  Max: 25  SpO2  Av.7 %  Min: 95 %  Max: 100 %  I/O last 3 completed shifts:  In: 2769.7 [P.O.:1120; I.V.:1308.4; IV Piggyback:341.3]  Out: 850 [Urine:850]    Physical Examination:  GGeneral: WNWD adult male reclined in bed  HEENT: NC/AT, PERRL, EOMI, MMM  Neck: Supple, trachea midline  CV: RRR, normal S1/S2  Resp: Grossly CTAB, normal respiratory effort, stable on RA  Abdominal: Soft, NT/ND  Extremities: 2+ pulses, ROM grossly intact, no BLE edema  Skin: Warm, dry, intact, no rash or erythema appreciated on exposed skin  Neuro: AAOx3, spontaneously moving extremities  Psych: Pleasant mood, somewhat flat affect, cooperative w/ exam       Laboratory:  Laboratory Data Reviewed: yes  Pertinent Findings:  Recent Labs   Lab 23  0505 23  0431 23  0549   WBC 22.56* 13.48* 10.18   HGB 15.1 12.5* 11.6*   HCT 44.3 39.1* 35.2*    125* 156   MCV 98 104* 103*   RDW 14.0 13.9 14.0    139 143   K 3.1* 3.4* 3.4*    106 104   CO2 26 20* 27   BUN 17 21 20   CREATININE 1.6* 1.8* 1.7*   * 111* 92   PROT  6.2 5.5* 5.2*   ALBUMIN 2.9* 2.4* 2.6*   BILITOT 3.1* 2.3* 1.5*   AST 27 29 19   ALKPHOS 120 98 94   ALT 6* 6* 5*     Recent Labs   Lab 05/26/23  1547   TROPONINI <0.006   *        Microbiology Data Reviewed: yes  Pertinent Findings:  Microbiology Results (last 7 days)       Procedure Component Value Units Date/Time    Blood culture x two cultures. Draw prior to antibiotics. [[id number]] Collected: 05/26/23 1630    Order Status: Completed Specimen: Blood from Peripheral, Antecubital, Left Updated: 05/29/23 0612     Blood Culture, Routine No Growth to date      No Growth to date      No Growth to date    Narrative:      Aerobic and anaerobic    Blood culture x two cultures. Draw prior to antibiotics. [[id number]]  (Abnormal) Collected: 05/26/23 1654    Order Status: Completed Specimen: Blood from Peripheral, Antecubital, Left Updated: 05/28/23 1141     Blood Culture, Routine Gram stain aer bottle: Gram positive cocci in clusters resembling Staph      Results called to and read back by:Summer Mallory RN 05/27/2023  21:03      Gram stain stephy bottle: Gram positive cocci in clusters resembling Staph      Positive results previously called 05/27/2023  22:31      COAGULASE-NEGATIVE STAPHYLOCOCCUS SPECIES  Organism is a probable contaminant      Narrative:      Aerobic and anaerobic    Urine culture [[id number]] Collected: 05/26/23 1605    Order Status: Completed Specimen: Urine Updated: 05/28/23 0151     Urine Culture, Routine No significant growth    Narrative:      Specimen Source->Urine    MRSA/SA Rapid ID by PCR from Blood culture [[id number]] Collected: 05/26/23 1654    Order Status: Completed Updated: 05/27/23 2231     Staph aureus ID by PCR Negative     Methicillin Resistant ID by PCR Negative    Narrative:      Aerobic and anaerobic             Other Results:  EKG (my interpretation): No new study since admission    Radiology Data Reviewed: yes  Pertinent Findings:  US Abdomen Limited   Final Result      Limited  exam due to extensive bowel gas and limited mobility.      Gallbladder likely partially contracted.  No definite cholelithiasis.  No convincing evidence of acute cholecystitis.  No abnormal biliary duct dilatation.  Recommend clinical correlation.      Splenomegaly.         Electronically signed by: Naga Dao   Date:    05/27/2023   Time:    12:39      CT Head Without Contrast   Final Result      Involutional changes of the right with no evidence of acute hemorrhage, mass or infarction.         Electronically signed by: Hipolito Shultz   Date:    05/26/2023   Time:    17:42      X-Ray Chest AP Portable   Final Result      1. Interstitial findings are accentuated by shallow inspiratory effort and habitus, mild edema is a consideration.  Correlation is advised.         Electronically signed by: Puneet Ochoa MD   Date:    05/26/2023   Time:    16:29           Current Medications:     Infusions:         Scheduled:   azithromycin  500 mg Intravenous Daily    carvediloL  6.25 mg Oral BID    cefTRIAXone (ROCEPHIN) IVPB  2 g Intravenous Daily    cloNIDine 0.1 mg/24 hr td ptwk  1 patch Transdermal Q7 Days    enoxparin  1 mg/kg Subcutaneous Q12H (prophylaxis, 0900/2100)    magnesium sulfate IVPB  2 g Intravenous Once    mupirocin   Nasal BID    thiamine (VITAMIN B1) IVPB  100 mg Intravenous Daily    valsartan  40 mg Oral QHS        PRN:  diazePAM, labetalol, lorazepam, melatonin, sodium chloride 0.9%    Antibiotics and Day Number of Therapy:  Antibiotics (72h ago, onward)      Start     Stop Route Frequency Ordered    05/27/23 1700  cefTRIAXone (ROCEPHIN) 2 g in dextrose 5 % in water (D5W) 5 % 50 mL IVPB (MB+)         -- IV Daily 05/26/23 2138 05/26/23 2245  mupirocin 2 % ointment         05/31 2059 Nasl 2 times daily 05/26/23 2142 05/26/23 2145  azithromycin (ZITHROMAX) 500 mg in dextrose 5 % (D5W) 250 mL IVPB (Vial-Mate)         -- IV Daily 05/26/23 2138             Lines and Day Number of Therapy:  PIV      "  Assessment and Plan:     Dejuan Correa is a 78 y.o. male w/ PMHx of pAfib, HTN, HLD, and EtOh Use Disorder who presented to [facility] on 5/26/2023 with a primary complaint of seizure-like activity around 2:00 pm day of admission. He was admitted to [facility] for acute encephalopathy / seizure-like activity. Pt has not demonstrated any further seizure-like activity and mental status has returned to baseline. He denies heavy alcohol use at this time and is not agreeable to rehab. Will plan to discharge today w/ home health for PT and skilled nursing.     Acute Encephalopathy, improved  Seizure like Activity   Severe ETOH use disorder  Found at approximately 2:00 pm down and in what appeared to be a convulsive episode. At home. Reportedly had 3 events. Per daughter was first seizure. Drinks 3-7 "fifths" of vodka daily. EtOH level < 10  Etiologies include EtOH w/d vs hypoglycemia vs infection  - Continue Azithro and CTX for possible aspiration event during seizure  - Ativan 2 mg q4h prn for anxiety or CIWA > 8  - Diazepam 10 mg q6h prn for seizures or EtOH w/d  - Plan to transfer to [facility] if experiences another seizure for EEG  - Psych consulted, appreciate recs  - Continue to closely monitor vitals  - Thiamine level still pending  - Thiamine 100 mg daily x 5 days  - Pt currently denying heavy EtOH use and not agreeable to rehab at this time  - Home health for PT and skilled nursing at discharge     Acute Hypoxic Respiratory Failure, resolved  Community acquired Pneumonia, suspected  Pt seen w/ NRB on 15 L, resolved weaned to room air  Plan:  - Etiology likely CAP 2/2 aspiration during seizure event  - Azithromycin and CTX for CAP coverage  - Transition to PO Levafloxacin to finish 5 day course for CAP coverage  - Rx for Levafloxacin given at discharge     Hypokalemia  K 2.9 on CMP in ED, improved.   - Continue to monitor on morning labs  - 3.4 this morning, repleted   "   Afib  Hx per chart review. Currently NSR.  Plan:  - Monitor on Tele  - Continue to monitor for symptoms  - Resume all home meds upon discharge     Hypertensive Urgency - resolved  HTN - chronic  Per chart review, pt takes Valsartan 40 mg daily at home. He was normotensive at initial presentation but shortly thereafter became hypertensive to 224/117.  Plan:  - s/p Labetalol 20 mg IV in ED  - Clonidine 0.1 mg 24 hr patch  - Labetalol 10 mg IV q4h PRN for SPB > 180  - Resumed Coreg at 6.25 mg yesterday, increased to home dose of 12.5 mg today  - Continue Valsartan when pt is able  - Continue to routinely check vitals  - Resume home meds at discharge     HLD  Per chart review. No apparent home meds.  Plan:  - Lipid panel demonstrated: decreased total cholesterol and HDL w/ normal triglycerides and LDL  - Can start statin therapy as appropriate        Code Status: Full code  VTE Ppx: Lovenox  Diet: NPO     Disposition: Pending discharge w/ home health for PT and skilled nursing      Sunny Flores DO  [facility] Internal Medicine, PGY-1  [facility] Medicine Team B    [facility] Medicine Hospitalist Pager numbers:   [facility] Hospitalist Medicine Team A (Anila/Priscilla): [phone]  [facility] Hospitalist Medicine Team B (Reji/Samina):  [phone]

## 2023-05-29 NOTE — PLAN OF CARE
Problem: Physical Therapy  Goal: Physical Therapy Goal  Description: Goals to be met by: 23     Patient will increase functional independence with mobility by performin. Supine to sit with Modified [place]  2. Sit to stand transfer with Modified [place]  3. Bed to chair transfer with Modified [place] with or without an AD.  4. Gait  x 150 feet with Modified [place] with or without an AD.    Outcome: Ongoing, Progressing     Limited PT Eval due to pt with unstable VS. Nurse cleared pt for participation in PT evaluation. Pt transitioned supine>sit with Supervision. Pt reporting increased light-headedness sitting EOB and BP taken: 181/107. MAP elevated this AM. Deferred standing/ambulating due to above reasons. Nurse notified. Pt returned supine and symptoms improved. Will progress pt as able/appropriate.

## 2023-05-29 NOTE — NURSING
PRN labetalol 10 mml, Coreg  25mg and melatonin administered per MAR. Patient is asymptomatic. Will continue to monitor.

## 2023-05-29 NOTE — PROGRESS NOTES
[facility] - Med Surg  Adult Nutrition  Progress Note    SUMMARY       Recommendations    Recommendations:   1. Continue adult regular diet.   2. Add Boost Plus BID and Deepak daily.   3. Monitor and encourage PO intake.   4. Monitor weight changes and labs.   5. RD to follow.    Goals:   Meet 50-75% assessed needs through PO and supplement intake by RD follow up.  Nutrition Goal Status: new  Communication of RD Recs: other (comment) (Plan of care)    Assessment and Plan    No nutrition diagnosis at this time.       Malnutrition Assessment    Orbital Region (Subcutaneous Fat Loss): moderate depletion   [place] Region (Muscle Loss): moderate depletion       Reason for Assessment    Reason For Assessment: identified at risk by screening criteria  Diagnosis: other (see comments) (alcohol use disorder, severe dependence)  Relevant Medical History: HTN  Interdisciplinary Rounds: did not attend  General Information Comments: Pt admitted 5/26/2023 with alcohol use disorder, severe dependence. Pt is currently on adult regular diet with intake reported %. Current wt 105.5 kg - increase of 7.5 kg since 5/26/2023. LBM 5/28/2023. Tino 16 - left anterior upper arm skin tear partial thickness tissue loss. NFPE completed 5/29/2023 - moderate temporal and orbital depletion. Pt agreed to Boost Plus and Deepak. Pt now has d/c orders. Pt reports he is eating well and has a good appetite.  Nutrition Discharge Planning: Pt to d/c on adult regular diet.    Patient Active Problem List   Diagnosis    PAF (paroxysmal atrial fibrillation)    PSVT (paroxysmal supraventricular tachycardia)    Primary hypertension    HLD (hyperlipidemia)    Alcohol use disorder, severe, dependence    Alcohol withdrawal seizure with delirium     Past Medical History:   Diagnosis Date    Atrial fibrillation     Hypertension     Irregular heart beat     Supraventricular tachycardia        Nutrition Risk Screen    Nutrition Risk Screen: no indicators  "present    Nutrition/Diet History    Food Preferences: No cultural or Mu-ism preferences identified.  Spiritual, Cultural Beliefs, Jehovah's witness Practices, Values that Affect Care: no  Food Allergies: NKFA  Factors Affecting Nutritional Intake: impaired cognitive status/motor control, excessive alcohol intake    Anthropometrics    Temp: 97.8 °F (36.6 °C)  Height Method: Stated  Height: 5' 11" (180.3 cm)  Height (inches): 71 in  Weight Method: Bed Scale  Weight: 105.5 kg (232 lb 9.4 oz)  Weight (lb): 232.59 lb  Ideal Body Weight (IBW), Male: 172 lb  % Ideal Body Weight, Male (lb): 133.17 %  BMI (Calculated): 32.5  BMI Grade: 30 - 34.9- obesity - grade I     Wt Readings from Last 10 Encounters:   05/29/23 105.5 kg (232 lb 9.4 oz)   10/21/21 98 kg (216 lb)   09/30/21 100.7 kg (222 lb)   09/21/21 97 kg (213 lb 13.5 oz)       Lab/Procedures/Meds    Pertinent Labs Reviewed: reviewed  Pertinent Labs Comments: Potassium 3.4, Creatinine 1.7, Albumin 2.6    Lab Results   Component Value Date/Time     05/29/2023 05:49 AM    K 3.4 (L) 05/29/2023 05:49 AM    EGFRNORACEVR 41 (A) 05/29/2023 05:49 AM    CALCIUM 8.1 (L) 05/29/2023 05:49 AM    PHOS 4.2 05/27/2023 08:16 AM    MG 1.6 05/28/2023 04:31 AM    ALBUMIN 2.6 (L) 05/29/2023 05:49 AM    TRIG 85 05/27/2023 05:05 AM     BMP  Lab Results   Component Value Date     05/29/2023    K 3.4 (L) 05/29/2023     05/29/2023    CO2 27 05/29/2023    BUN 20 05/29/2023    CREATININE 1.7 (H) 05/29/2023    CALCIUM 8.1 (L) 05/29/2023    ANIONGAP 12 05/29/2023    EGFRNORACEVR 41 (A) 05/29/2023     No results found for: LABA1C, HGBA1C  POCT Glucose   Date Value Ref Range Status   05/28/2023 144 (H) 70 - 110 mg/dL Final   05/28/2023 166 (H) 70 - 110 mg/dL Final   05/28/2023 120 (H) 70 - 110 mg/dL Final   05/28/2023 126 (H) 70 - 110 mg/dL Final   05/27/2023 121 (H) 70 - 110 mg/dL Final   05/27/2023 127 (H) 70 - 110 mg/dL Final   05/27/2023 160 (H) 70 - 110 mg/dL Final   05/27/2023 143 " (H) 70 - 110 mg/dL Final   05/26/2023 119 (H) 70 - 110 mg/dL Final   05/26/2023 399 (H) 70 - 110 mg/dL Final       Recent Labs   Lab 05/29/23  0549   WBC 10.18   RBC 3.43*   HGB 11.6*   HCT 35.2*      *   MCH 33.8*   MCHC 33.0       Pertinent Medications Reviewed: reviewed  Pertinent Medications Comments: Azithromycin, Ceftriaxone, Magnesium Sulfate, Potassium Bicarbonate, Thiamine    Scheduled Meds:   azithromycin  500 mg Intravenous Daily    carvediloL  12.5 mg Oral BID    cefTRIAXone (ROCEPHIN) IVPB  2 g Intravenous Daily    cloNIDine 0.1 mg/24 hr td ptwk  1 patch Transdermal Q7 Days    enoxparin  1 mg/kg Subcutaneous Q12H (prophylaxis, 0900/2100)    mupirocin   Nasal BID    thiamine (VITAMIN B1) IVPB  100 mg Intravenous Daily    valsartan  40 mg Oral QHS     Continuous Infusions:  PRN Meds:.diazePAM, labetalol, lorazepam, melatonin, sodium chloride 0.9%    Estimated/Assessed Needs    Weight Used For Calorie Calculations: 105.5 kg (232 lb 9.4 oz)  Energy Calorie Requirements (kcal): 2110 (20 kcal/kg)  Energy Need Method: Kcal/kg  Protein Requirements: 85 kg (0.8 g/kg)  Weight Used For Protein Calculations: 105.5 kg (232 lb 9.4 oz)  Fluid Requirements (mL): 2110  Estimated Fluid Requirement Method: RDA Method  RDA Method (mL): 2110       Nutrition Prescription Ordered    Current Diet Order: Adult Regular Diet    Evaluation of Received Nutrient/Fluid Intake    I/O: 1491.9/700    Intake/Output Summary (Last 24 hours) at 5/29/2023 1322  Last data filed at 5/28/2023 1700  Gross per 24 hour   Intake 480 ml   Output --   Net 480 ml       Energy Calories Required: not meeting needs  Protein Required: not meeting needs  Fluid Required: not meeting needs  % Intake of Estimated Energy Needs: 75 - 100 %  % Meal Intake: 75 - 100 %    Nutrition Risk    Level of Risk/Frequency of Follow-up:  (1x/week)     Monitor and Evaluation    Food and Nutrient Intake: food and beverage intake  Food and Nutrient Adminstration:  diet order  Anthropometric Measurements: weight, weight change, body mass index  Biochemical Data, Medical Tests and Procedures: electrolyte and renal panel, gastrointestinal profile, glucose/endocrine profile  Nutrition-Focused Physical Findings: overall appearance, skin     Nutrition Follow-Up    RD Follow-up?: Yes

## 2023-05-29 NOTE — PROGRESS NOTES
[facility]                    Pharmacy       Discharge Medication Education    Patient ACCEPTED medication education. Pharmacy has provided education on the name, indication, and possible side effects of the medication(s) prescribed, using teach-back method.     The following medications have also been discussed, during this admission.        Medication List        START taking these medications      levoFLOXacin 750 MG tablet  Commonly known as: LEVAQUIN  Take 1 tablet (750 mg total) by mouth once daily. for 2 days            CONTINUE taking these medications      carvediloL 12.5 MG tablet  Commonly known as: COREG     hydroCHLOROthiazide 12.5 MG Tab  Commonly known as: HYDRODIURIL     valsartan 40 MG tablet  Commonly known as: DIOVAN  TAKE 1 TABLET (40 MG TOTAL) BY MOUTH ONCE DAILY. PT STATED TAKING 320MG ONCE DAILY     XARELTO ORAL               Where to Get Your Medications        These medications were sent to [facility]  [address], [place] [address]      Hours: Mon-Fri, 8a-5:30p Phone: [phone]   levoFLOXacin 750 MG tablet          Thank you  Cassandra Garber, PharmD  [phone]

## 2023-05-29 NOTE — PROGRESS NOTES
Discharge orders noted. Additional clinical references attached. Patient's discharge instructions given by bedside RN and reviewed via this VN.  Education provided on new medication, diagnosis, and follow-up appointments with patient and family per patient request.  Teach back method used. Patient and family verbalized understanding. All questions answered. Patient awaiting hospital PFC transportation to home. Bedside nurse updated on patient status.     05/29/23 1413   AVS Confirmation   Discharge instructions and AVS given to and reviewed with patient and/or significant other. Yes

## 2023-05-29 NOTE — PLAN OF CARE
Recommendations    Recommendations:   1. Continue adult regular diet.   2. Add Boost Plus BID and Deepak daily.   3. Monitor and encourage PO intake.   4. Monitor weight changes and labs.   5. RD to follow.    Goals:   Meet 50-75% assessed needs through PO and supplement intake by RD follow up.  Nutrition Goal Status: new  Communication of RD Recs: other (comment) (Plan of care)

## 2023-05-29 NOTE — CONSULTS
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    Differential Method Automated    Comprehensive metabolic panel    Collection Time: 05/28/23  4:31 AM   Result Value Ref Range    Sodium 139 136 - 145 mmol/L    Potassium 3.4 (L) 3.5 - 5.1 mmol/L    Chloride 106 95 - 110 mmol/L    CO2 20 (L) 23 - 29 mmol/L    Glucose 111 (H) 70 - 110 mg/dL    BUN 21 8 - 23 mg/dL    Creatinine 1.8 (H) 0.5 - 1.4 mg/dL    Calcium 7.7 (L) 8.7 - 10.5 mg/dL    Total Protein 5.5 (L) 6.0 - 8.4 g/dL    Albumin 2.4 (L) 3.5 - 5.2 g/dL    Total Bilirubin 2.3 (H) 0.1 - 1.0 mg/dL    Alkaline Phosphatase 98 55 - 135 U/L    AST 29 10 - 40 U/L    ALT 6 (L) 10 - 44 U/L    Anion Gap 13 8 - 16 mmol/L    eGFR 38 (A) >60 mL/min/1.73 m^2   Magnesium    Collection Time: 05/28/23  4:31 AM   Result Value Ref Range    Magnesium 1.6 1.6 - 2.6 mg/dL   CK    Collection Time: 05/28/23  4:31 AM   Result Value Ref Range     (H) 20 - 200 U/L   POCT glucose    Collection Time: 05/28/23  5:29 AM   Result Value Ref Range    POCT Glucose 120 (H) 70 - 110 mg/dL   POCT glucose    Collection Time: 05/28/23 11:21 AM   Result Value Ref Range    POCT Glucose 166 (H) 70 - 110 mg/dL   POCT glucose    Collection Time: 05/28/23  3:36 PM   Result Value Ref Range    POCT Glucose 144 (H) 70 - 110 mg/dL   CBC auto differential    Collection Time: 05/29/23  5:49 AM    Result Value Ref Range    WBC 10.18 3.90 - 12.70 K/uL    RBC 3.43 (L) 4.60 - 6.20 M/uL    Hemoglobin 11.6 (L) 14.0 - 18.0 g/dL    Hematocrit 35.2 (L) 40.0 - 54.0 %     (H) 82 - 98 fL    MCH 33.8 (H) 27.0 - 31.0 pg    MCHC 33.0 32.0 - 36.0 g/dL    RDW 14.0 11.5 - 14.5 %    Platelets 156 150 - 450 K/uL    MPV 10.7 9.2 - 12.9 fL    Immature Granulocytes 0.4 0.0 - 0.5 %    Gran # (ANC) 7.9 (H) 1.8 - 7.7 K/uL    Immature Grans (Abs) 0.04 0.00 - 0.04 K/uL    Lymph # 1.4 1.0 - 4.8 K/uL    Mono # 0.5 0.3 - 1.0 K/uL    Eos # 0.3 0.0 - 0.5 K/uL    Baso # 0.06 0.00 - 0.20 K/uL    nRBC 0 0 /100 WBC    Gran % 77.9 (H) 38.0 - 73.0 %    Lymph % 13.6 (L) 18.0 - 48.0 %    Mono % 4.9 4.0 - 15.0 %    Eosinophil % 2.6 0.0 - 8.0 %    Basophil % 0.6 0.0 - 1.9 %    Differential Method Automated    Comprehensive metabolic panel    Collection Time: 05/29/23  5:49 AM   Result Value Ref Range    Sodium 143 136 - 145 mmol/L    Potassium 3.4 (L) 3.5 - 5.1 mmol/L    Chloride 104 95 - 110 mmol/L    CO2 27 23 - 29 mmol/L    Glucose 92 70 - 110 mg/dL    BUN 20 8 - 23 mg/dL    Creatinine 1.7 (H) 0.5 - 1.4 mg/dL    Calcium 8.1 (L) 8.7 - 10.5 mg/dL    Total Protein 5.2 (L) 6.0 - 8.4 g/dL    Albumin 2.6 (L) 3.5 - 5.2 g/dL    Total Bilirubin 1.5 (H) 0.1 - 1.0 mg/dL    Alkaline Phosphatase 94 55 - 135 U/L    AST 19 10 - 40 U/L    ALT 5 (L) 10 - 44 U/L    Anion Gap 12 8 - 16 mmol/L    eGFR 41 (A) >60 mL/min/1.73 m^2      No results found for: PHENYTOIN, PHENOBARB, VALPROATE, CBMZ      EXAMINATION    VITALS   Vitals:    05/29/23 0712 05/29/23 0739 05/29/23 0905 05/29/23 0910   BP: 136/88  (!) 180/104 (!) 186/101   BP Location:   Left arm    Patient Position:   Lying    Pulse: 85   93   Resp: 20      Temp: 98.3 °F (36.8 °C)      TempSrc:       SpO2: 97% 97%     Weight: 105.5 kg (232 lb 9.4 oz)      Height:            CONSTITUTIONAL  General Appearance: NAD, unremarkable, age appropriate, lying in bed, overweight, calm     MUSCULOSKELETAL  Muscle  "Strength and Tone: WNL    Abnormal Involuntary Movements: none observed   Gait and Station: Attempted but unable to assess due to medical acuity     PSYCHIATRIC   Behavior/Cooperation:  cooperative, eye contact normal, calm   Speech:  normal tone, normal pitch, normal volume, slowed  Language: grossly intact, able to name, able to repeat with spontaneous speech  Mood:  "OK"  Affect:  congruent with mood and full / reactive   Associations: intact; no JR  Thought Process: Linear with intermittent / improving confusion   Thought Content: denies SI, HI, AH, VH, TH, delusions, or paranoia (no RIS observed)   Sensorium:  Awake  Alert and Oriented: to person, type of place, state, month, year, and situation.  He was disoriented to name of place and city.  Memory: 3/3 immediate, 1/3 at 5 minutes    Recent: Limited / Intact; able to report intermittent recent events   Remote: Limited ; Named 2/4 past presidents   Attention/concentration: Intact / Fair. Appropriate for age/education. Able to spell [id number] & [id number].   Similarities: Intact (difference between apple and orange?)  Abstract reasoning: Intact  Fund of Knowledge: Named 2/4 past presidents   Insight: Limited / Intact  Judgment: Limited / Intact    [facility] ICU Delirium Assessment - NEGATIVE    Is the patient aware of the biomedical complications associated with substance abuse and mental illness? yes        MEDICAL DECISION MAKING    ASSESSMENT        Alcohol Use Disorder, Severe, Dependence   Acute Metabolic Encephalopathy (improving / back near baseline)        RECOMMENDATIONS       - Patient does not currently meet PEC criteria due to not being an imminent threat to self/others and not being gravely disabled 2/2 mental illness.     - With reasonable medical certainty, based on a present state examination, the patient currently appears to have medical decision-making capacity as made evident by his ability to cognitively utilize information provided to him to " "express a choice that is stable over time, to understand the relevant information pertaining his diagnoses and recommended treatments, to appreciate the consequences of the decision (risks vs benefits) regarding accepting vs refusing treatment, and to manipulate all of the data in a logical fashion.      - Continue IV Thiamine supplementation per primary team given patient's Alcohol Use Disorder and resolving AMS (discussed risks/benefits/alt vs no treatment with patient).    - Begin Folate and Multi-Vitamin supplementation given patient's Alcohol Use Disorder and resolving AMS (discussed risks/benefits/alt vs no treatment with patient).    - Continue to monitor for alcohol withdrawal with strict CIWA-Ar monitoring with PRN Ativan for CIWA > 8 (discussed risks/benefits/alt vs no treatment with patient).     - Psychotherapy was performed with patient as noted above with a focus on improving alcohol cessation / total sobriety and orientation / confusion.    - Patient's most recent resulted labs, imaging, and EKG were reviewed today ; CT Head from 05/26/23 with "Involutional changes of the right with no evidence of acute hemorrhage, mass or infarction."  Ethanol < 10 and UDS positive for benzodiazepines (iatrogenic)      - Despite significant counseling, education, and motivational interviewing, the patient denies any interest in any form of substance abuse treatment (denies interest in 12 steps meetings, MAT, outpatient, IOP, residential rehab, etc).  He was willing to take from me and discuss a MH / Addiction resource packet today.       - Patient was instructed to call 911 and/or [phone] and return to the nearest ED if he begins feeling suicidal, homicidal, or gravely disabled (for s/p this hospitalization).       - Thank you for this consult ; will continue to follow patient while at [facility]         Total time spent with patient and/or managing/coordinating patient's care today (excluding the time spent on " psychotherapy): 78 minutes   Time spent on psychotherapy today (as noted above): 20 minutes   Total time for encounter today including psychotherapy: 98 minutes      More than 50% of the time was spent counseling/coordinating care.     Consulting clinician was informed of the encounter and consult note.       STAFF:  Holden Polanco MD  [facility]   5/29/2023

## 2023-05-29 NOTE — PLAN OF CARE
REFERRAL RECOMMENDATIONS FOR SUBSTANCE ABUSE & MENTAL HEALTH      IN CASE OF SUICIDAL THINKING, call the National Suicide Hotline Number: 988    988 Suicide & Crisis Lifeline: 983 , 9-376-870-KDAS (1875)  https://988Holidu.[employer]       SUBSTANCE ABUSE:     [facility] (formerly known as the ABU)  [x] [phone], Option 2  [x] [address] [facility] 4th Floor, [place] [address]  [x] https://www.ochsner.org/services/[facility]-recovery-program  [x] [facility] delivers comprehensive and collaborative treatment for alcohol and substance use disorders.  Excellent program for working professionals or anyone else seeking recovery.  [x] Requires insurance approval prior to starting program, call number above for more information.  [x] Intensive Outpatient Rehabilitation Program - M-F 9am-3pm - daily groups with psychologists and social workers, sessions with MDs 3x per week   [x] Ambulatory detox and dual diagnosis available      SUBOXONE:     NOTE: some Suboxone clinics require their clients to participate in a structured program (such as an IOP) in order to be prescribed Suboxone.  Some clinics have a long waiting list.  Most of these clinics do not accept walk-in clients, so call first to to learn what must be done to get started on Suboxone.    [facility] - [phone] (can do Sublocade)  [address] [facility], [place] [address]    [address]  [phone]    [facility] - [phone] (can do Sublocade)  [address] [place] [address]    [facility]  [address] [place]  [phone]     [facility] (very short waiting list, may accept some walk-in's but call first if possible)  [address], [place] [address]  [phone]; [phone]    [facility]   [address] [place]    [phone]    [facility] (can usually be seen within a week but is cash only  for appointment)  [address] [facility]  [address] [place], [place]  [phone]    [facility])  [address] [facility] [address]  [phone]    [place], [place]:    [facility] - [address] [place] [address] - Tel: [phone]    Ravinder Sarah - [address] [place] [place] [address] - Tel: [phone]    Jesus Herbert - [address] [place] [address] - Tel: [phone]    Arpit Buckner - [address] [place] [address] - Tel: [phone]    Darci Gillespie - [address] [place] [address] - Tel: [phone]    [place], [place]:     Dr. Caroline Landeros and Dr. Emiliano Mills - [address] [place], [place] - Tel: [phone]    Dr. Hilda Keating - [address] [facility] [place], [place] - Tel: [phone]    Dr. Alejo Thomas - Tel: [phone]    Dr. Lencho Davis - [facility] - [phone]      METHADONE:     [facility] (the only methadone clinic in the [facility], has two locations)  [x] [place] - [address] [place] [address], [phone]  [x] [facility] [place] [address], [phone]      12 STEP PROGRAMS (and similar):     Alcoholics Anonymous (local)  [x] [phone]  [x] www.[place].org for schedules for in-person and online meetings  [x] There are AA meetings throughout the day all over [facility]  [x] AA costs nothing to attend; they pass a basket for donations but this is not required    Narcotics Anonymous  [x] [phone]  [x] www.noana.org  [x] There are NA meetings throughout the day all over town  [x] NA costs nothing to attend; they pass a basket for donations but this is not required    Alcoholics Anonymous Online Intergroup (national)  [x] www.aa-intergroup.org  [x] Good resource for large, nation-wide meetings  [x] Can also attend smaller, local meetings in other cities  [x] Countless meetings all day and all night  [x] AA costs nothing to attend; they pass a basket for donations  but this is not required    Flying Sober - 24/7 zoom meetings for women and coed - sign on anytime, anywhere!  https://flying"[employer]"sober.Keepcon/40-5-wkdigngz/    Online Intergroup of AA - 121 Open AA [place] Meeting - 24/7 zoom meetings  https://aa-intergroup.org/meetings/    LOOKING FOR AN ALTERNATIVE TO 12 STEP PROGRAMS - check out:  SMART Recovery: https://www.smartrecovery.org/about-us  [facility]ente Recovery: https://recoverydharma.org      DETOX UNITS (USUALLY 5-7 DAYS):     [facility]: [address], [place]  [phone], call first to ensure bed availability    [facility]: [address] [facility] [address] [facility]  [phone], Option 1, call first to ensure bed availability    [facility] Detox and Recovery Center: [facility] Griselda Osborn, [facility]  [phone] (intake by appointment only)    [facility]: [address], [facility]  [phone]      INTENSIVE OUTPATIENT PROGRAMS:     [facility] (formerly known as the ABU)  [x] [phone], Option 2  [x] [address] [facility] 4th [facility], [facility] [address]  [x] https://www.[facility].org/services/[facility]-recovery-program  [x] [facility] delivers comprehensive and collaborative treatment for alcohol and substance use disorders.  Excellent program for working professionals or anyone else seeking recovery.  [x] Requires insurance approval prior to starting program, call number above for more information.  [x] Intensive Outpatient Rehabilitation Program - M-F 9am-3pm - daily groups with psychologists and social workers, sessions with MDs 3x per week   [x] Ambulatory detox and dual diagnosis available    [facility] Int[facility]patient Program  [x] [phone]  [x] [address] [facility] (the clinic not [facility])  [x] Call number above for more info and to check insurance requirements    [facility]  [address] [place] [address] [phone]    [facility]:  [address] [facility]  [address]?, [place], [place] [address]?, [phone]  [address] [facility]?, [place], [place] [address]?, [phone]    RESIDENTIAL REHABS (USUALLY 28 DAYS):     [facility]: [address], [phone]    [facility] Recovery Center: [address] , [facility]  [phone] (intake by appointment only)    [facility] (men only) [address], [place], [phone]    [facility] (Female only) [address] [place], [phone]    [facility]: [address], [place], men's program [phone], women's program [phone]    [facility]: [address], [place], [phone]    [facility]: [address], [place], [place], [phone]    [facility]: Men only, [phone], [address] [facility]: [address], [place], [place], [phone]    [facility]: [facility] [place], [place],  [phone]  New Location: [address] [place] [address] [address], [place] [address], [phone]    [facility]:   ?[address] [facility]?[place], [place] [address]?[phone]    [place]: [address] [place] [address], [phone]    [place]: [place], [place], [phone]     [facility]: [place], [phone]    [facility]: [place], [phone]    [facility]: [place], [place], [phone]    [place]: [place], [phone]    [facility]: [address] [facility], [place] [address], [phone]    COMMUNITY ADDICTION CLINICS:     [facility]: [address] [facility], [address] [place] -[phone] -or- [address] [place] [address]    [facility] [place]: [address] [place]  [address]     [facility]: Clinics [phone]; Crisis [phone]    [facility]: [address] [place] [address]    [facility]: [address] [place] [address]    [facility]: [address] [place] [address],    [facility]: 2nd Floor [address] filiberto[place] [address]    [facility]: [address], [place] [address]    [facility], [address] [place], [place] [address]    [facility]: [address] [facility], [place] [phone]  (serves youth 16-23 years old)    Critical access hospital Center: [facility]/[place] [phone]    [facility]: [address] [facility], [place] [phone]    [facility]: [address] [facility] [phone]    [facility]: [address] [place], [place] [address]010 [facility], [address], [phone]     [facility]: [address] [facility], [phone], [phone]    RESOURCES IN OTHER [facility]:     [facility]: [address] [facility] Payne[facility] Center[facility], [phone], [phone]    [facility]: [address] [facility], [address], [phone]    [facility], [address] [place] [address], [phone]    [facility]: [address] [facility], [phone]    [facility], [address] [facility], [phone] ([facility])    [facility], [address] [facility], [phone] ([facility])    [facility], [address], [place], [phone] ([facility], and [facility])    [facility], [address] , [place], [phone] ([facility])    [facility] Hotline [phone], [phone]    [facility], [address] [facility], [facility] [place], [address] [facility], [address], [facility], [address] [facility]  "Hussein Laplace [facility], [address] [facility] [place] [facility], [address], [place]    [facility], [address] [facility], [address] [phone]    [facility], [address] [facility] [phone]    [facility] Addiction Recovery, [address] [facility] [phone]    [facility], [address] [facility], [phone]      Yakut SPEAKING (en español):     Información de la reunión de Alcohólicos Anónimos  I[facility]an [facility], 10:00 am  Habla español  Esta reunión está abierta y cualquiera puede asistir.    Polish speaking Alcoholics anonymous meetings:  El "Ihsan [place] AA Skype" es un ihsan on line de Alcohólicos Anónimos en hospitals. El ihsan es misti, gratuito y virtual a través de Skype Audio. El ihsan funciona mediante trudy llamada grupal de voz, por lo que no se utiliza la videollamada, ni se pueden keven las imágenes o rostros de los participantes. Hace davide años y medio abrimos el primer Ihsan de AA por Skype en [place], soledad actualmente asisten personas desde [place], [place], [place], [place], [place], [place], [place], [place], [place], [place], [place] y [place], entre otros.    El ihsan es muy útil para los alcohólicos que residen en lugares donde no se celebran reuniones de AA, o residen en lugares donde las reuniones de AA son un número limitado de días a la semana, o para aquellos compañeros que se hayan de viaje o que, por cualquier motivo, se hayan convalecientes y no pueden desplazarse. Todos los días nos reuniones a las 21:00 (hora española)    Podéis obtener más información sobre el ihsan y princess sesiones en la página web [email or web address]      MENTAL HEALTH:     [facility] - Outpatient Clinic  [phone]    [facility] - General " Psychiatry Intensive Outpatient Program  [facility] (formerly known as the BMU)  [phone], option 3    [facility]  Department of Psychiatry - Dual Diagnosis Intensive Outpatient Program  [facility] (formerly known as the ABU)  [phone], option 2      COMMUNITY MENTAL HEALTH CENTERS:     [facility]  (aka [facility], aka [facility])  Serves [facility].  Serves uninsured patients & those with Medicaid.  Main location: [address] [place] [id number] [phone]  Walk-in's available during regular business hours.  24/7 Crisis Line: [phone]    [facility]  (aka [facility], [facility])  Serves [facility].  Serves uninsured patients, those with Medicaid and some private plans.  Walk-in's available during regular business hours.  Primary care services available as well.  [facility]: [address] [facility] [place] [address];  [phone]  [place]: [address] [place] [address];  [phone]  24/7 Crisis Line: [phone]    [facility]  Serves uninsured patients & those with Medicaid, call for more info.  Primary care, pediatrics, HIV treatment, and dentistry services available as well.  Three locations.  [phone]    [facility]?Corporate Office  Serves patients with Medicaid, Medicare, and private insurance  [address]  [place],?[place] [address] [phone]    [facility]  Serves uninsured on a sliding scale, as well as Medicaid, Medicare, and private plans.  Eight locations around the [facility] area.  [phone]    [facility]  Serves uninsured patients & those with Medicaid, private insurances.  Primary care available as well.  [phone]  [address] [place]  [address]    [facility] Outpatient Psychiatry  Serves veterans who were honorably discharged.  [address] [place] [address]  [phone]  24/7 Veterans Crisis Line: [phone] (Press 1)    If you have private insurance and need to find a specialist, please contact your insurance network to request a list of providers covered by your benefits.      MENTAL HEALTH/ADDICTIVE DISORDERS:     AA ([phone]), NA ([phone])   National Suicide Prevention Lifeline- Call 1-972.536.2370 Available 24 hours everyday  [facility] 763-0994; Crisis Line [phone] - Call for options A-F:  Intensive Outpatient Treatment/ Day programs   [facility], please contact   [facility], please contact [phone] or [phone] to speak with an admissions counselor.  [facility] (Methadone Maintenance)   [address] [place] [address], [phone]  [address] [place] [place] [address] [phone]  [facility], [address] [address], [phone]  [facility] [phone]  [place] please contact [phone]  [facility], [address] [facility], 4th [place] [address] Phone: [phone]   Acer  [place] Office: [place] [address], [phone]  [place] Office: [address] [facility], [address], [place] [address], [phone]  [place] Office: [address] [facility] [place] [address] [phone]    Outpatient Substance Abuse Treatment   [facility] (Methadone Maintenance)   [address] [place] [address], [phone]  [address] [place] [place] [place] [address] [phone]  [facility], [address] [place] [address], [phone]  Acer  [place] Office: [address], [place] [address], [phone]  [place] Office: [address] [facility] [address], [place] [address],  [phone]  [place] Office: [address] [place] [address] [phone]  [facility] [place] [facility], [address] [place] [address] [phone]   [facility], [address] [facility], [address], [phone]  [facility], [address] [place], [place] [phone]    Residential Substance Abuse Treatment   [facility] [address] [facility], [phone]  [facility], [address] [facility], [phone]  [facility] (men only) [address] [place] [address], [phone]  [facility] at the [facility] (women only) [address] [place] [address] [phone]  [facility], [address] [place] [address] [phone]  [facility] (women only), intakes at 4[address] [facility], [phone]  [facility] (7-day program, $100, [address], [phone], [phone], [phone])  [facility] (Men only, [phone]), [address], Steve (Vets*/Non-Vets)  [facility] (Men only, $400/month program fee) [address] [facility] [address], [phone]  [facility] (Women over age 39 only), [address] [facility], [phone]    Out of Area:    [place], [address] [facility] [address], [place], [place] ([phone])  [facility] Recovery Program (men only), [address] [place] [address], [phone]  [facility], [address] [place], [place] [phone])  [place], [address] [place] [address] Dr. Hall, [place] ([phone])  [facility] Addiction [facility], [address] [facility], [phone]  [facility] (Women only, has to have mental illness, can be homeless or substance abuser), [phone]        DOMESTIC VIOLENCE RESOURCES:     Advocacy  [facility] ([facility])  [address] [address]    [facility] ? [phone]  Services provided: emergency shelter, individual advocacy,  information and referrals, group support, children's program, medical advocacy, forensic medical exams, primary care, legal assistance, counseling, safety planning, and caregiver support    [facility] HEALING AND EMPOWERMENT [place]  Confidential location  [facility] ? [phone]  Services provided: short term emergency shelter, all services provided are free of charge    [facility] CENTERS FOR COMMUNITY ADVOCACY  Multiple locations in [facility], [facility], [place], and [facility] ([place], [place], and [place])    [place] ? [phone]  Services provided: emergency shelter, individual advocacy, information and referrals, group support, children's program, medical advocacy, legal assistance in obtaining restraining orders, counseling, safety planning, and caregiver support    [facility]   Emergency Shelter   [phone]  Emergency Services ,Legal and Financial Assistance Services ,Housing Services ,Support Services     [facility]   [phone]  Emergency Services ,Counseling Services , Housing Services ,Support Services ,Children's Services     WOMEN WITH A VISION  [address] [place] [address]  WWAV ? [phone]  Services provided: advocacy, health education and supportive services, specializing in free healing services for marginalized groups, including LGBTQ individuals and sex workers    SEXUAL TRAUMA AWARENESS AND RESPONSE (STAR)  [address] [place] [address]    STAR ? [phone] [phone]-[phone]  Services provided: individual advocacy, information and referrals, group support, medical advocacy, legal assistance, counseling, and safety planning for survivors of sexual assault    [facility] ([facility])  [address] [place] [address]  [facility] Forensic Program ? [phone]  Services provided: free forensic medical exams for sexual assault and domestic violence, which can be performed up to 5 days  after an incident. It is not necessary to make a police report to receive a forensic medical exam    Legal  [facility] [employer]  [address], [place] [address]  [facility] ? [phone]  Services provided: free emergency legal representation for survivors of doemstic violence residing in [facility]. Legal services may include temporary restraining orders, temporary child support, custody, and use of property    [facility] LEGAL SERVICES (SLLS)  [address] [facility] [address] [facility], [place] [address]  SLLS ? [phone]  Services provided: free legal representation for survivors of domestic violence residing in [facility]. Legal services may include temporary child support, custody, and divorce      HOTLINES:     [facility] DOMESTIC VIOLENCE HOTLINE  [phone]    Services provided: free and confidential hotline for victims and survivors of domestic violence. All calls will be routed to a domestic violence service provided in the victim or survivor's area    NATIONAL HUMAN TRAFFICKING HOTLINE  ([phone] 482-5208    Services provided: national anti-trafficking hotline serving victims and survivors of human trafficking. Provides information about local resources, and access to safe space to report tips, seek services, and ask for help    VIA LINK  211 or [phone]    Service provided: counselors can provide crisis counseling. Counselors can also provide information and referrals to programs which can help with needs such as food, shelter, medical care, financial assistance, mental health services, substance abuse treatment, senior services, childcare, and more      HOMELESS SHELTERS:      Homeless shelters  [facility]  Emergency shelter for individuals and families  [address]  [phone]  [facility]  Emergency shelter for men only  Meals daily 6am, 2pm, & 6pm  Clothing, case management M-F by appointment (ID/job/housing/legal assistance), mail  [address] [place]    [phone]  [facility] [place]  Emergency shelter for men  [address] [facility]  [phone]  Emergency shelter for women  [address]   [phone]  Breakfast & lunch daily, dinner M-F  Case management, job counseling services   [facility]  Emergency shelter for teens and young adults up to 20yo  [address]   [phone]  [facility]  Emergency shelter for women over 17yo and their kids  [address] [place] [address]  [phone]  [place] [facility]  Day program, meals M-F 1PM (arrive early)  Showers, laundry, hygiene kits, showers, phones, , notary services, case management, ID assistance  [address]   [phone] M-F 8am-2:30pm  [employer]  Day program  MTW 7:30am-3:30pm,  8:30am-3:30pm  Crisis intervention, employment assistance, food/clothing, hygiene kits, bus tokens, mail  [address] [facility] [address]  [phone]  [facility]  Mobile outreach for homeless persons in [facility]  [phone]  [facility]  Primary healthcare, case management, dental services, TB placement  Call ahead  [address] [facility]  [phone]  [facility]  Connects homeless people with their loved ones in other cities by providing transportation costs   [phone]      [place] RESOURCES:     [place] [facility] Crisis Response Team:    Region 12 ([place], [place], [place], and [facility]) ([facility])  [phone]      Outpatient Mental Health & Addiction Clinic Resources for both [facility] and [facility]:    [facility] Mental Healthcare Resources  Website: www.mhr.org  Main Number: 677-790-6878    [facility] ([facility])  [address] ([address] [place] [address]  [phone]    [facility] ([facility])  [address] ([address] [facility]) [facility]  [place] [address]  [phone]    [facility]  [address] ([address]) [place] [address]  [phone]    [facility]  [address] ([address] [place] [address]) [place] [address]  [phone]      Addiction Treatment Resources for both [facility] and [facility]:    [facility] (Detox, Residential, PHP, IOP, and Aftercare Programs)  [address] [place] [address]  [phone]    [facility] (Residential, IOP, Transitional Living, and Aftercare Programs)  [address] [place] [address]  [phone]    [facility] (Inpatient, Residential, Detox, IOP, Outpatient, and Aftercare Programs)  [address] [place] [address] [place] [address]  [phone] or toll free at [phone]      Outpatient Mental Health Psychotherapy Resources for both [facility] and [facility]:    Tova Nogueira, [facility]  [address]  [place] [address]  [phone]  Specialties: Depression, Anxiety, and Life Transitions    Angela Valera, PhD  [address] [place] [address]  [address]  [place] [address]  [phone]  Specialties: Testing and Evaluation, Education and Learning Disabilities, and ADHD    Tiffanie Lee, [facility] [address] [place] [address], [place] [address]  [phone]  Specialties: Obsessive-Compulsive (OCD), Depression, and Relationship Issues    Kari Walker LPC [address] [place] [address]  [phone]  Specialties: Trauma & PTSD, Mood Disorders, and Anxiety    Kari Arias, PhD, [facility]  [facility] [address] [place] [address]  [phone]  Specialties: Family Conflict, Child, and Relationship Issues    Kajal Cervantes, [facility] [place] [address] [phone]  Specialties: Anxiety, Depression, and Anger Management        IN CASE OF SUICIDAL THINKING,  call the National Suicide Hotline Number: 988    988 Suicide & Crisis Lifeline: 988 , 0-803-316-TALK 8255)  Provides 24/7, free and confidential support for people in distress, prevention and crisis resources for you or your loved ones, and best practices for professionals.    Call, text or chat.  https://988[employer].org

## 2023-05-29 NOTE — PT/OT/SLP EVAL
Physical Therapy Evaluation and Treatment    Patient Name:  Dejuan Correa   MRN:  [id number]    Recommendations:     Discharge Recommendations:  (TBD)   Discharge Equipment Recommendations:  (TBD)   Barriers to discharge: Decreased caregiver support    Assessment:     Dejuan Correa is a 78 y.o. male admitted with a medical diagnosis of Alcohol withdrawal seizure with delirium.  He presents with the following impairments/functional limitations: impaired cardiopulmonary response to activity, impaired functional mobility, impaired self care skills .  Limited PT Eval due to pt with unstable VS. Nurse cleared pt for participation in PT evaluation. Pt transitioned supine>sit with Supervision. Pt reporting increased light-headedness sitting EOB and BP taken: 181/107. MAP elevated this AM. Deferred standing/ambulating due to above reasons. Nurse notified. Pt returned supine and symptoms improved. Will progress pt as able/appropriate.           Rehab Prognosis: Good and Fair; patient would benefit from acute skilled PT services to address these deficits and reach maximum level of function.    Recent Surgery: * No surgery found *      Plan:     During this hospitalization, patient to be seen 3 x/week to address the identified rehab impairments via gait training, therapeutic exercises, therapeutic activities, neuromuscular re-education and progress toward the following goals:    Plan of Care Expires:  06/29/23    Subjective     Chief Complaint: light-headedness   Patient/Family Comments/goals: return to PLOF  Pain/Comfort:  Pain Rating 1: 0/10  Pain Rating Post-Intervention 1: 0/10    Patients cultural, spiritual, Roman Catholic conflicts given the current situation: no    Living Environment:  Pt lives with brother in a [facility], 0 DESIREE, WIS  Prior to admission, patients level of function was Mod I with use of cnae in home and community, pt is retired and drives; pt endorses 3 falls in the past month.  Equipment used at home:  (3 pt  cane (juanita)).  DME owned (not currently used): none.  Upon discharge, patient will have assistance from limited assist available from brother and spouse lives with daughter in [place].    Objective:     Communicated with nsg prior to session.  Patient found HOB elevated with telemetry, peripheral IV  upon PT entry to room.    General Precautions: Standard, fall  Orthopedic Precautions:N/A   Braces: N/A  Respiratory Status: Room air    Exams:  Cognitive Exam:  Patient is oriented to Person, Place, Time, and Situation  Postural Exam:  Patient presented with the following abnormalities:    -       Rounded shoulders  Sensation: denies numbness/tingling  Skin Integrity/Edema:      -       Skin integrity: Visible skin intact  RUE ROM: WFL  RUE Strength: WFL  LUE ROM: WFL  LUE Strength: WFL  RLE ROM: WFL  RLE Strength: WFL  LLE ROM: WFL  LLE Strength: WFL    Functional Mobility:  Bed Mobility:     Scooting: supervision  Supine to Sit: supervision  Sit to Supine: supervision      AM-PAC 6 CLICK MOBILITY  Total Score:14       Treatment & Education:  Limited PT Eval due to pt with unstable VS.   Nurse cleared pt for participation in PT evaluation and reports pt is medicated for BP  Pt transitioned supine>sit with Supervision.   Pt reporting increased light-headedness sitting EOB and BP taken: 181/107.   MAP elevated this AM.  Pt performed seated scoots along the side of  the bed.    Deferred standing/ambulating due to above reasons.   Nurse notified of BP.   Pt returned supine and symptoms improved.  Pt educated on PLB and breathing techniques to assist with lowering BP     Patient left HOB elevated with all lines intact, call button in reach, bed alarm on, nsg notified, and spouse present.    GOALS:   Multidisciplinary Problems       Physical Therapy Goals          Problem: Physical Therapy    Goal Priority Disciplines Outcome Goal Variances Interventions   Physical Therapy Goal     PT, PT/OT Ongoing, Progressing      Description: Goals to be met by: 23     Patient will increase functional independence with mobility by performin. Supine to sit with Modified [place]  2. Sit to stand transfer with Modified [place]  3. Bed to chair transfer with Modified [place] with or without an AD.  4. Gait  x 150 feet with Modified [place] with or without an AD.                         History:     Past Medical History:   Diagnosis Date    Atrial fibrillation     Hypertension     Irregular heart beat     Supraventricular tachycardia        No past surgical history on file.    Time Tracking:     PT Received On: 23  PT Start Time: 1101     PT Stop Time: 1124  PT Total Time (min): 23 min     Billable Minutes: Evaluation 10 and Therapeutic Activity 13      2023

## 2023-05-30 ENCOUNTER — NURSE TRIAGE (OUTPATIENT)
Dept: ADMINISTRATIVE | Facility: CLINIC | Age: 78
End: 2023-05-30
Payer: MEDICARE

## 2023-05-30 NOTE — TELEPHONE ENCOUNTER
PD DAY 1:     Reports dizziness with standing last night, fell backwards, but caught himself & didn't injure anything. Dizziness lasted a few seconds. Pt has not had episode today but has not stood. Denies weakness. Denies visual changes.     Dispo-be seen today in office. Pt states he just moved here, does not have pcp established. Discussed home care, callback symptoms & f/u in  today for eval. Pt vu.     Reason for Disposition   MODERATE dizziness (e.g., interferes with normal activities) (Exception: dizziness caused by heat exposure, sudden standing, or poor fluid intake)    Additional Information   Negative: SEVERE difficulty breathing (e.g., struggling for each breath, speaks in single words)   Negative: Shock suspected (e.g., cold/pale/clammy skin, too weak to stand, low BP, rapid pulse)   Negative: Difficult to awaken or acting confused (e.g., disoriented, slurred speech)   Negative: Fainted, and still feels dizzy afterwards   Negative: Overdose (accidental or intentional) of medications   Negative: New neurologic deficit that is present now: * Weakness of the face, arm, or leg on one side of the body * Numbness of the face, arm, or leg on one side of the body * Loss of speech or garbled speech   Negative: Heart beating < 50 beats per minute OR > 140 beats per minute   Negative: Sounds like a life-threatening emergency to the triager   Negative: SEVERE dizziness (e.g., unable to stand, requires support to walk, feels like passing out now)   Negative: SEVERE headache or neck pain   Negative: Spinning or tilting sensation (vertigo) present now and one or more stroke risk factors (i.e., hypertension, diabetes mellitus, prior stroke/TIA, heart attack, age over 60) (Exception: prior physician evaluation for this AND no different/worse than usual)   Negative: Neurologic deficit that was brief (now gone), ANY of the following:* Weakness of the face, arm, or leg on one side of the body* Numbness of the face, arm,  or leg on one side of the body* Loss of speech or garbled speech   Negative: Loss of vision or double vision  (Exception: Similar to previous migraines.)   Negative: Extra heart beats OR irregular heart beating (i.e., 'palpitations')   Negative: Difficulty breathing   Negative: Drinking very little and has signs of dehydration (e.g., no urine > 12 hours, very dry mouth, very lightheaded)   Negative: Follows bleeding (e.g., stomach, rectum, vagina)  (Exception: Became dizzy from sight of small amount blood.)   Negative: Patient sounds very sick or weak to the triager   Negative: Lightheadedness (dizziness) present now, after 2 hours of rest and fluids   Negative: Spinning or tilting sensation (vertigo) present now   Negative: Fever > 103 F (39.4 C)   Negative: Fever > 100.0 F (37.8 C) and has diabetes mellitus or a weak immune system (e.g., HIV positive, cancer chemotherapy, organ transplant, splenectomy, chronic steroids)    Protocols used: Dizziness-A-OH

## 2023-06-01 LAB
BACTERIA BLD CULT: NORMAL
VIT B1 BLD-MCNC: 103 UG/L (ref 38–122)

## 2023-06-04 ENCOUNTER — HOSPITAL ENCOUNTER (INPATIENT)
Facility: HOSPITAL | Age: 78
LOS: 7 days | Discharge: NURSING, REHAB OR LONG-TERM CARE | DRG: 064 | End: 2023-06-12
Attending: EMERGENCY MEDICINE | Admitting: INTERNAL MEDICINE
Payer: MEDICARE

## 2023-06-04 DIAGNOSIS — I10 PRIMARY HYPERTENSION: ICD-10-CM

## 2023-06-04 DIAGNOSIS — E78.5 HYPERLIPIDEMIA, UNSPECIFIED HYPERLIPIDEMIA TYPE: ICD-10-CM

## 2023-06-04 DIAGNOSIS — I63.9 ACUTE CVA (CEREBROVASCULAR ACCIDENT): ICD-10-CM

## 2023-06-04 DIAGNOSIS — I48.0 PAROXYSMAL ATRIAL FIBRILLATION: ICD-10-CM

## 2023-06-04 DIAGNOSIS — I63.9 CARDIOEMBOLIC STROKE: ICD-10-CM

## 2023-06-04 DIAGNOSIS — I63.9 CEREBELLAR STROKE: Primary | ICD-10-CM

## 2023-06-04 LAB
ALBUMIN SERPL BCP-MCNC: 3.4 G/DL (ref 3.5–5.2)
ALLENS TEST: ABNORMAL
ALLENS TEST: NORMAL
ALP SERPL-CCNC: 103 U/L (ref 55–135)
ALT SERPL W/O P-5'-P-CCNC: 6 U/L (ref 10–44)
ANION GAP SERPL CALC-SCNC: 12 MMOL/L (ref 8–16)
AST SERPL-CCNC: 16 U/L (ref 10–40)
BASOPHILS # BLD AUTO: 0.08 K/UL (ref 0–0.2)
BASOPHILS NFR BLD: 0.8 % (ref 0–1.9)
BILIRUB SERPL-MCNC: 1.2 MG/DL (ref 0.1–1)
BUN SERPL-MCNC: 14 MG/DL (ref 8–23)
CALCIUM SERPL-MCNC: 9 MG/DL (ref 8.7–10.5)
CHLORIDE SERPL-SCNC: 103 MMOL/L (ref 95–110)
CO2 SERPL-SCNC: 26 MMOL/L (ref 23–29)
CREAT SERPL-MCNC: 1.1 MG/DL (ref 0.5–1.4)
CREAT SERPL-MCNC: 1.2 MG/DL (ref 0.5–1.4)
DELSYS: ABNORMAL
DELSYS: NORMAL
DIFFERENTIAL METHOD: ABNORMAL
EOSINOPHIL # BLD AUTO: 0.1 K/UL (ref 0–0.5)
EOSINOPHIL NFR BLD: 1 % (ref 0–8)
ERYTHROCYTE [DISTWIDTH] IN BLOOD BY AUTOMATED COUNT: 13.5 % (ref 11.5–14.5)
EST. GFR  (NO RACE VARIABLE): >60 ML/MIN/1.73 M^2
ETHANOL SERPL-MCNC: <10 MG/DL
GLUCOSE SERPL-MCNC: 118 MG/DL (ref 70–110)
HCT VFR BLD AUTO: 41.6 % (ref 40–54)
HGB BLD-MCNC: 13.5 G/DL (ref 14–18)
IMM GRANULOCYTES # BLD AUTO: 0.05 K/UL (ref 0–0.04)
IMM GRANULOCYTES NFR BLD AUTO: 0.5 % (ref 0–0.5)
INR PPP: 1.2 (ref 0.8–1.2)
LYMPHOCYTES # BLD AUTO: 1.3 K/UL (ref 1–4.8)
LYMPHOCYTES NFR BLD: 12.9 % (ref 18–48)
MCH RBC QN AUTO: 32.8 PG (ref 27–31)
MCHC RBC AUTO-ENTMCNC: 32.5 G/DL (ref 32–36)
MCV RBC AUTO: 101 FL (ref 82–98)
MONOCYTES # BLD AUTO: 0.7 K/UL (ref 0.3–1)
MONOCYTES NFR BLD: 7 % (ref 4–15)
NEUTROPHILS # BLD AUTO: 7.8 K/UL (ref 1.8–7.7)
NEUTROPHILS NFR BLD: 77.8 % (ref 38–73)
NRBC BLD-RTO: 0 /100 WBC
PLATELET # BLD AUTO: 295 K/UL (ref 150–450)
PMV BLD AUTO: 10.4 FL (ref 9.2–12.9)
POC PTINR: 1.3 (ref 0.9–1.2)
POTASSIUM SERPL-SCNC: 3.9 MMOL/L (ref 3.5–5.1)
PROT SERPL-MCNC: 6.7 G/DL (ref 6–8.4)
PROTHROMBIN TIME: 12.5 SEC (ref 9–12.5)
RBC # BLD AUTO: 4.12 M/UL (ref 4.6–6.2)
SAMPLE: ABNORMAL
SAMPLE: NORMAL
SITE: ABNORMAL
SITE: NORMAL
SODIUM SERPL-SCNC: 141 MMOL/L (ref 136–145)
TSH SERPL DL<=0.005 MIU/L-ACNC: 2.92 UIU/ML (ref 0.4–4)
WBC # BLD AUTO: 10.06 K/UL (ref 3.9–12.7)

## 2023-06-04 PROCEDURE — 85610 PROTHROMBIN TIME: CPT

## 2023-06-04 PROCEDURE — G0378 HOSPITAL OBSERVATION PER HR: HCPCS

## 2023-06-04 PROCEDURE — 85025 COMPLETE CBC W/AUTO DIFF WBC: CPT | Performed by: EMERGENCY MEDICINE

## 2023-06-04 PROCEDURE — 80053 COMPREHEN METABOLIC PANEL: CPT | Performed by: EMERGENCY MEDICINE

## 2023-06-04 PROCEDURE — 85610 PROTHROMBIN TIME: CPT | Performed by: EMERGENCY MEDICINE

## 2023-06-04 PROCEDURE — 99285 EMERGENCY DEPT VISIT HI MDM: CPT | Mod: 25

## 2023-06-04 PROCEDURE — 93005 ELECTROCARDIOGRAM TRACING: CPT

## 2023-06-04 PROCEDURE — 99900035 HC TECH TIME PER 15 MIN (STAT)

## 2023-06-04 PROCEDURE — 82565 ASSAY OF CREATININE: CPT

## 2023-06-04 PROCEDURE — 93010 ELECTROCARDIOGRAM REPORT: CPT | Mod: ,,, | Performed by: INTERNAL MEDICINE

## 2023-06-04 PROCEDURE — 82077 ASSAY SPEC XCP UR&BREATH IA: CPT | Performed by: EMERGENCY MEDICINE

## 2023-06-04 PROCEDURE — 93010 EKG 12-LEAD: ICD-10-PCS | Mod: ,,, | Performed by: INTERNAL MEDICINE

## 2023-06-04 PROCEDURE — 84443 ASSAY THYROID STIM HORMONE: CPT | Performed by: EMERGENCY MEDICINE

## 2023-06-04 RX ORDER — ASPIRIN 81 MG/1
81 TABLET ORAL DAILY
Status: DISCONTINUED | OUTPATIENT
Start: 2023-06-05 | End: 2023-06-05

## 2023-06-04 RX ORDER — ATORVASTATIN CALCIUM 40 MG/1
40 TABLET, FILM COATED ORAL DAILY
Status: DISCONTINUED | OUTPATIENT
Start: 2023-06-05 | End: 2023-06-12 | Stop reason: HOSPADM

## 2023-06-04 RX ORDER — ACETAMINOPHEN 325 MG/1
650 TABLET ORAL EVERY 6 HOURS PRN
Status: DISCONTINUED | OUTPATIENT
Start: 2023-06-04 | End: 2023-06-12 | Stop reason: HOSPADM

## 2023-06-04 RX ORDER — SODIUM CHLORIDE 0.9 % (FLUSH) 0.9 %
10 SYRINGE (ML) INJECTION
Status: DISCONTINUED | OUTPATIENT
Start: 2023-06-04 | End: 2023-06-12 | Stop reason: HOSPADM

## 2023-06-04 RX ORDER — LABETALOL HYDROCHLORIDE 5 MG/ML
5 INJECTION, SOLUTION INTRAVENOUS EVERY 4 HOURS PRN
Status: DISCONTINUED | OUTPATIENT
Start: 2023-06-04 | End: 2023-06-08

## 2023-06-04 NOTE — ED NOTES
Pt daughter and Brother called at two sperate times informed by this nurse that pt is not in room. Numbers taken and family informed that  permission  to distribute information will be requested.     Alejandra Correa ( daughter) [phone]  Brother [phone]

## 2023-06-04 NOTE — ED NOTES
Pt arrived from home complains of Fatigue (Slurred speech and left sided weakness - last seen normal 1pm. On arrival, awake, alert. Speech clear but slightly slurred. Facial muscles symmetrical. Able to lift and hold all extremities with slight drift on left arm and leg. ) Pt appears to be comfortable and in no distress.

## 2023-06-05 LAB
ALBUMIN SERPL BCP-MCNC: 3.3 G/DL (ref 3.5–5.2)
ALP SERPL-CCNC: 102 U/L (ref 55–135)
ALT SERPL W/O P-5'-P-CCNC: 6 U/L (ref 10–44)
ANION GAP SERPL CALC-SCNC: 15 MMOL/L (ref 8–16)
AST SERPL-CCNC: 16 U/L (ref 10–40)
AV INDEX (PROSTH): 0.87
AV MEAN GRADIENT: 2 MMHG
AV PEAK GRADIENT: 4 MMHG
AV VALVE AREA: 3.23 CM2
AV VELOCITY RATIO: 0.82
BASOPHILS # BLD AUTO: 0.07 K/UL (ref 0–0.2)
BASOPHILS NFR BLD: 0.8 % (ref 0–1.9)
BILIRUB SERPL-MCNC: 1.3 MG/DL (ref 0.1–1)
BSA FOR ECHO PROCEDURE: 2.24 M2
BUN SERPL-MCNC: 13 MG/DL (ref 8–23)
CALCIUM SERPL-MCNC: 9.6 MG/DL (ref 8.7–10.5)
CHLORIDE SERPL-SCNC: 103 MMOL/L (ref 95–110)
CO2 SERPL-SCNC: 21 MMOL/L (ref 23–29)
CREAT SERPL-MCNC: 1.1 MG/DL (ref 0.5–1.4)
CV ECHO LV RWT: 0.72 CM
DIFFERENTIAL METHOD: ABNORMAL
DOP CALC AO PEAK VEL: 1.04 M/S
DOP CALC AO VTI: 16.5 CM
DOP CALC LVOT AREA: 3.7 CM2
DOP CALC LVOT DIAMETER: 2.17 CM
DOP CALC LVOT PEAK VEL: 0.85 M/S
DOP CALC LVOT STROKE VOLUME: 53.23 CM3
DOP CALC MV VTI: 18.3 CM
DOP CALCLVOT PEAK VEL VTI: 14.4 CM
E WAVE DECELERATION TIME: 322.24 MSEC
E/A RATIO: 0.59
E/E' RATIO: 10 M/S
ECHO LV POSTERIOR WALL: 1.55 CM (ref 0.6–1.1)
EJECTION FRACTION: 55 %
EOSINOPHIL # BLD AUTO: 0.1 K/UL (ref 0–0.5)
EOSINOPHIL NFR BLD: 1.1 % (ref 0–8)
ERYTHROCYTE [DISTWIDTH] IN BLOOD BY AUTOMATED COUNT: 13.4 % (ref 11.5–14.5)
EST. GFR  (NO RACE VARIABLE): >60 ML/MIN/1.73 M^2
FOLATE SERPL-MCNC: 3.2 NG/ML (ref 4–24)
FRACTIONAL SHORTENING: 31 % (ref 28–44)
GLUCOSE SERPL-MCNC: 108 MG/DL (ref 70–110)
HCT VFR BLD AUTO: 41.1 % (ref 40–54)
HGB BLD-MCNC: 13.4 G/DL (ref 14–18)
IMM GRANULOCYTES # BLD AUTO: 0.03 K/UL (ref 0–0.04)
IMM GRANULOCYTES NFR BLD AUTO: 0.3 % (ref 0–0.5)
INR PPP: 1.2 (ref 0.8–1.2)
INTERVENTRICULAR SEPTUM: 1.69 CM (ref 0.6–1.1)
IVC DIAMETER: 1.44 CM
LA MAJOR: 6.59 CM
LA MINOR: 5.91 CM
LA WIDTH: 4.7 CM
LEFT ATRIUM SIZE: 4.85 CM
LEFT ATRIUM VOLUME INDEX MOD: 35.4 ML/M2
LEFT ATRIUM VOLUME INDEX: 54.9 ML/M2
LEFT ATRIUM VOLUME MOD: 77.8 CM3
LEFT ATRIUM VOLUME: 120.74 CM3
LEFT INTERNAL DIMENSION IN SYSTOLE: 2.97 CM (ref 2.1–4)
LEFT VENTRICLE DIASTOLIC VOLUME INDEX: 37.62 ML/M2
LEFT VENTRICLE DIASTOLIC VOLUME: 82.76 ML
LEFT VENTRICLE MASS INDEX: 132 G/M2
LEFT VENTRICLE SYSTOLIC VOLUME INDEX: 15.5 ML/M2
LEFT VENTRICLE SYSTOLIC VOLUME: 34.13 ML
LEFT VENTRICULAR INTERNAL DIMENSION IN DIASTOLE: 4.29 CM (ref 3.5–6)
LEFT VENTRICULAR MASS: 290.14 G
LV LATERAL E/E' RATIO: 9.17 M/S
LV SEPTAL E/E' RATIO: 11 M/S
LVOT MG: 1.63 MMHG
LVOT MV: 0.61 CM/S
LYMPHOCYTES # BLD AUTO: 1.7 K/UL (ref 1–4.8)
LYMPHOCYTES NFR BLD: 19 % (ref 18–48)
MAGNESIUM SERPL-MCNC: 1.3 MG/DL (ref 1.6–2.6)
MCH RBC QN AUTO: 33.2 PG (ref 27–31)
MCHC RBC AUTO-ENTMCNC: 32.6 G/DL (ref 32–36)
MCV RBC AUTO: 102 FL (ref 82–98)
MONOCYTES # BLD AUTO: 0.7 K/UL (ref 0.3–1)
MONOCYTES NFR BLD: 7.4 % (ref 4–15)
MV MEAN GRADIENT: 1 MMHG
MV PEAK A VEL: 0.93 M/S
MV PEAK E VEL: 0.55 M/S
MV PEAK GRADIENT: 6 MMHG
MV STENOSIS PRESSURE HALF TIME: 80.24 MS
MV VALVE AREA BY CONTINUITY EQUATION: 2.91 CM2
MV VALVE AREA P 1/2 METHOD: 2.74 CM2
NEUTROPHILS # BLD AUTO: 6.4 K/UL (ref 1.8–7.7)
NEUTROPHILS NFR BLD: 71.4 % (ref 38–73)
NRBC BLD-RTO: 0 /100 WBC
OHS LV EJECTION FRACTION SIMPSONS BIPLANE MOD: 6 %
PHOSPHATE SERPL-MCNC: 3.4 MG/DL (ref 2.7–4.5)
PISA TR MAX VEL: 2.61 M/S
PLATELET # BLD AUTO: 267 K/UL (ref 150–450)
PMV BLD AUTO: 10.4 FL (ref 9.2–12.9)
POCT GLUCOSE: 105 MG/DL (ref 70–110)
POTASSIUM SERPL-SCNC: 3.7 MMOL/L (ref 3.5–5.1)
PROT SERPL-MCNC: 7.3 G/DL (ref 6–8.4)
PROTHROMBIN TIME: 13.1 SEC (ref 9–12.5)
PULM VEIN S/D RATIO: 1.25
PV PEAK D VEL: 0.32 M/S
PV PEAK S VEL: 0.4 M/S
RA MAJOR: 4.54 CM
RA PRESSURE: 3 MMHG
RBC # BLD AUTO: 4.04 M/UL (ref 4.6–6.2)
RIGHT VENTRICULAR END-DIASTOLIC DIMENSION: 3.82 CM
RV TISSUE DOPPLER FREE WALL SYSTOLIC VELOCITY 1 (APICAL 4 CHAMBER VIEW): 0.02 CM/S
SODIUM SERPL-SCNC: 139 MMOL/L (ref 136–145)
TDI LATERAL: 0.06 M/S
TDI SEPTAL: 0.05 M/S
TDI: 0.06 M/S
TR MAX PG: 27 MMHG
TROPONIN I SERPL DL<=0.01 NG/ML-MCNC: 0.04 NG/ML (ref 0–0.03)
TROPONIN I SERPL DL<=0.01 NG/ML-MCNC: 0.07 NG/ML (ref 0–0.03)
TV REST PULMONARY ARTERY PRESSURE: 30 MMHG
VIT B12 SERPL-MCNC: 263 PG/ML (ref 210–950)
WBC # BLD AUTO: 8.95 K/UL (ref 3.9–12.7)

## 2023-06-05 PROCEDURE — 82746 ASSAY OF FOLIC ACID SERUM: CPT | Performed by: STUDENT IN AN ORGANIZED HEALTH CARE EDUCATION/TRAINING PROGRAM

## 2023-06-05 PROCEDURE — 63600175 PHARM REV CODE 636 W HCPCS: Performed by: STUDENT IN AN ORGANIZED HEALTH CARE EDUCATION/TRAINING PROGRAM

## 2023-06-05 PROCEDURE — 36415 COLL VENOUS BLD VENIPUNCTURE: CPT | Performed by: STUDENT IN AN ORGANIZED HEALTH CARE EDUCATION/TRAINING PROGRAM

## 2023-06-05 PROCEDURE — 97166 OT EVAL MOD COMPLEX 45 MIN: CPT

## 2023-06-05 PROCEDURE — 97530 THERAPEUTIC ACTIVITIES: CPT

## 2023-06-05 PROCEDURE — 94761 N-INVAS EAR/PLS OXIMETRY MLT: CPT

## 2023-06-05 PROCEDURE — 97535 SELF CARE MNGMENT TRAINING: CPT

## 2023-06-05 PROCEDURE — 92523 SPEECH SOUND LANG COMPREHEN: CPT

## 2023-06-05 PROCEDURE — 82607 VITAMIN B-12: CPT | Performed by: STUDENT IN AN ORGANIZED HEALTH CARE EDUCATION/TRAINING PROGRAM

## 2023-06-05 PROCEDURE — 85025 COMPLETE CBC W/AUTO DIFF WBC: CPT | Performed by: STUDENT IN AN ORGANIZED HEALTH CARE EDUCATION/TRAINING PROGRAM

## 2023-06-05 PROCEDURE — 80053 COMPREHEN METABOLIC PANEL: CPT | Performed by: STUDENT IN AN ORGANIZED HEALTH CARE EDUCATION/TRAINING PROGRAM

## 2023-06-05 PROCEDURE — 11000001 HC ACUTE MED/SURG PRIVATE ROOM

## 2023-06-05 PROCEDURE — 92610 EVALUATE SWALLOWING FUNCTION: CPT

## 2023-06-05 PROCEDURE — 83735 ASSAY OF MAGNESIUM: CPT | Performed by: STUDENT IN AN ORGANIZED HEALTH CARE EDUCATION/TRAINING PROGRAM

## 2023-06-05 PROCEDURE — 84484 ASSAY OF TROPONIN QUANT: CPT | Performed by: STUDENT IN AN ORGANIZED HEALTH CARE EDUCATION/TRAINING PROGRAM

## 2023-06-05 PROCEDURE — 97162 PT EVAL MOD COMPLEX 30 MIN: CPT

## 2023-06-05 PROCEDURE — 84100 ASSAY OF PHOSPHORUS: CPT | Performed by: STUDENT IN AN ORGANIZED HEALTH CARE EDUCATION/TRAINING PROGRAM

## 2023-06-05 PROCEDURE — 25500020 PHARM REV CODE 255: Performed by: INTERNAL MEDICINE

## 2023-06-05 PROCEDURE — 85610 PROTHROMBIN TIME: CPT | Performed by: STUDENT IN AN ORGANIZED HEALTH CARE EDUCATION/TRAINING PROGRAM

## 2023-06-05 PROCEDURE — 84484 ASSAY OF TROPONIN QUANT: CPT | Mod: 91 | Performed by: STUDENT IN AN ORGANIZED HEALTH CARE EDUCATION/TRAINING PROGRAM

## 2023-06-05 RX ORDER — ASPIRIN 81 MG/1
81 TABLET ORAL DAILY
Status: DISCONTINUED | OUTPATIENT
Start: 2023-06-06 | End: 2023-06-12 | Stop reason: HOSPADM

## 2023-06-05 RX ORDER — MAGNESIUM SULFATE HEPTAHYDRATE 40 MG/ML
2 INJECTION, SOLUTION INTRAVENOUS ONCE
Status: COMPLETED | OUTPATIENT
Start: 2023-06-05 | End: 2023-06-05

## 2023-06-05 RX ORDER — ASPIRIN 300 MG/1
300 SUPPOSITORY RECTAL ONCE
Status: DISCONTINUED | OUTPATIENT
Start: 2023-06-05 | End: 2023-06-05

## 2023-06-05 RX ADMIN — IOHEXOL 100 ML: 350 INJECTION, SOLUTION INTRAVENOUS at 05:06

## 2023-06-05 RX ADMIN — MAGNESIUM SULFATE 2 G: 2 INJECTION INTRAVENOUS at 08:06

## 2023-06-05 NOTE — NURSING
RAPID RESPONSE NURSE PROACTIVE ROUNDING NOTE   1145 piv start with US x2 attempt with successful placement of RFA 20 g.

## 2023-06-05 NOTE — PT/OT/SLP EVAL
Physical Therapy Evaluation    Patient Name:  Dejuan Correa   MRN:  [id number]    Recommendations:     Discharge Recommendations: other (see comments) (High Intensity Therapy Placement)   Discharge Equipment Recommendations: other (see comments) (Defer to next level of care)   Barriers to discharge:  Currently requiring assist for all functional mobility, weakness in L hemibody, Left field cut, poor safety awareness     Assessment:     Dejuan Correa is a 78 y.o. male admitted with a medical diagnosis of Cerebellar stroke.  He presents with the following impairments/functional limitations: weakness, impaired functional mobility, impaired cognition, decreased safety awareness, impaired coordination, impaired endurance, gait instability, impaired fine motor, impaired balance, decreased upper extremity function, abnormal tone, impaired self care skills, visual deficits, decreased lower extremity function     Patient seen for physical therapy evaluation on this date.  Patient presenting with L hemibody weakness, L facial droop, decreased L sided scanning (possible field cut), poor standing balance, and poor endurance.  Patient will benefit from inpatient physical therapy to address limitations.  Patient will benefit from High Intensity therapy at discharge.  Patient was living in community setting functioning at Modified Independent level for ADLs, and mobility prior to this event.  There is an expectation of returning to prior level of function to maintain independence thus avoiding readmission.  Patient's clinical condition meets full Inpatient Rehab (IPR) criteria; including the ability to actively participate in 3 hours of therapy.  A lower level of care(SNF) cannot provide the interdisciplinary treatment approach needed. .    Rehab Prognosis: Good; patient would benefit from acute skilled PT services to address these deficits and reach maximum level of function.    Recent Surgery: * No surgery found *      Plan:  "    During this hospitalization, patient to be seen 6 x/week to address the identified rehab impairments via gait training, therapeutic activities, therapeutic exercises, neuromuscular re-education and progress toward the following goals:    Plan of Care Expires:  07/05/23    Subjective     Chief Complaint: "I haven't been up much"  (Patient noted with slurred speech, L sided facial droop)  Patient/Family Comments/goals: to return to PLOF  Pain/Comfort:  Pain Rating 1: 0/10  Pain Rating 2: 0/10    Patients cultural, spiritual, Buddhism conflicts given the current situation: no    Living Environment:  Patient lives with brother in a SSH, no DESIREE, T/S in bathroom (states they are building a WIS).  Prior to admission, patients level of function was per patient, he was ambulating with a RW or Cane at modified independent level, Retired, + Driving, Modified Independent with ADLs.  Equipment used at home: walker, rolling, cane, straight, shower chair.  DME owned (not currently used): none.  Upon discharge, patient will have assistance from brother.    Objective:     Communicated with nurse Artis prior to session.  Patient found supine with bed alarm, telemetry, peripheral IV  upon PT entry to room.    General Precautions: Standard, fall, aspiration  Orthopedic Precautions:N/A   Braces: N/A  Respiratory Status: Room air    Exams:  Cognitive Exam:  Patient is oriented to Person, Place, Time, Situation, and follows simple commands.  Speech is slurred.  Left facial droop  Visuospatial:  does not track towards left past midline B eyes.  No nystagmus.    Fine Motor Coordination:    -       Impaired  Left hand, finger to nose decreased, Left hand thumb/finger opposition skills decreased, Left hand, diadochokinesis skill decreased, and LLE heel goode decreaed, L L JEVON decreased  Gross Motor Coordination:  Decreased L Hemibody due to weakness   Postural Exam:  Patient presented with the following abnormalities:    -       " Rounded shoulders  -       Forward head  -       Kyphosis  Sensation:  Does not endorse numbness or tingling  Skin Integrity/Edema:      -       Skin integrity: Visible skin intact  -       Edema: None noted B LEs  RLE ROM: WFL  RLE Strength: WFL  LLE ROM: WFL  LLE Strength: Deficits: Hip: 2+ to 3-/5  Knee: 3-/5   Ankle 3-/5  Tone:  increased L UE and L LE (2/5 on Modified Kamron Scale), no clonus observed    Functional Mobility:  Bed Mobility:     Rolling Right: contact guard assistance with use of bedrail  Scooting: maximal assistance to assist with scooting towards HOB  Supine to Sit: minimum assistance to attend to left side   Sit to Supine: minimum assistance  Transfers:     Sit to Stand:  maximal assistance with rolling walker x 3 trials - patient tolerates standing x ~ 1-2 minutes each trial, weight shifts Left and Right  Gait: patient requires max assist to attempt to take 1 step, poor ability to weight shift to take step  Balance: Seated:  EOB, CGA  Standing:  requires RW, Mod to Max assist, poor ability to shift side to side      AM-PAC 6 CLICK MOBILITY  Total Score:13       Treatment & Education:  Patient educated on importance of OOB.  Patient educated on signs and symptoms of a CVA.  Patient educated on nature of his stroke and deficits observed.  Patient educated to turn head to left due to observed decreased scanning toward left side.  Educated patient to call for assist for functional mobility.      Patient left supine with all lines intact, call button in reach, bed alarm on, and nurse notified.    GOALS:   Multidisciplinary Problems       Physical Therapy Goals          Problem: Physical Therapy    Goal Priority Disciplines Outcome Goal Variances Interventions   Physical Therapy Goal     PT, PT/OT Ongoing, Progressing     Description: Goals to be met by: 2023     Patient will increase functional independence with mobility by performin. Supine to sit with MInimal Assistance  2. Sit to  supine with MInimal Assistance  3. Rolling to Left and Right with Minimal Assistance.  4. Sit to stand transfer with Minimal Assistance  5. Bed to chair transfer with Minimal Assistance using Rolling Walker  6. Gait  x 10 feet with Maximum Assistance using LRAD.  7. Stand for 3 minutes with Moderate Assistance using LRAD to perform a functional activity.                           History:     Past Medical History:   Diagnosis Date    Atrial fibrillation     Hypertension     Irregular heart beat     Supraventricular tachycardia        History reviewed. No pertinent surgical history.    Time Tracking:     PT Received On: 06/05/23  PT Start Time: 1212     PT Stop Time: 1238  PT Total Time (min): 26 min     Billable Minutes: Evaluation 15 and Therapeutic Activity 11      06/05/2023

## 2023-06-05 NOTE — PT/OT/SLP EVAL
Speech Language Pathology Evaluation  Cognitive/Bedside Swallow    Patient Name:  Dejuan Correa   MRN:  [id number]  Admitting Diagnosis: Cerebellar stroke    Recommendations:                  General Recommendations:  Dysphagia therapy, Speech/language therapy, and Cognitive-linguistic therapy  Diet recommendations:  NPO, Full liquids   Aspiration Precautions: 1 bite/sip at a time, Alternating bites/sips, Assistance with meals, Check for pocketing/oral residue, Eliminate distractions, Feed only when awake/alert, Frequent oral care, HOB to 90 degrees, Meds whole 1 at a time, Monitor for s/s of aspiration, Small bites/sips, and Standard aspiration precautions   General Precautions: Standard, fall, aspiration  Communication strategies:  provide increased time to answer    Assessment:     Dejuan Correa is a 78 y.o. male admitted with cerebellar CVA with an SLP diagnosis of Dysphagia, Dysarthria, and Cognitive-Linguistic Impairment.       History of Present Illness:  Dejuan Correa is a 78 y.o.  male who has a past medical history of paroxysmal Atrial fibrillation, Supraventricular tachycardia, HTN, HLD, and alcohol use disorder with hx of complicated withdrawal. The patient presented to [facility] on 6/4/2023 with a primary complaint of slurred speech and left sided weakness that was first noticed by his son at around 2 pm the day of admission. He had last been seen normal 1 hour prior to symptom onset.      Patient was hospitalized (5/26-5/29) for seizure like activity 2/2 severe alcohol use disorder with complicated withdrawal. He was also treated for PNA presumed aspiration wth rocephin and azithromycin and was discharged home with home health.      In ED: awake, alert, and oriented x4 / speech slurred but intelligible / slight L sided drift but no L sided weakness / Not a tpa candidate, on xarelto / HTNsive to 187/94, HR 80, EKG NS.      Patient states he had not noticed any weakness or slurred  "speech until his son told him his speech was abnormal, prompting him to present to the ED for evaluation.   He denies alcohol use following recent hospitalization (reports he has quit for good). Last drink ~ 7 days ago. No evidence of withdrawal upon my exam. He reports adherence to all prescribed medications, including xarelto and rate control for P-afib.      He denies palpitations, chest pain, shortness of breath, fever/chills/ nausea/vomiting, abdominal pain, dizziness, lightheadedness.     Past Medical History:   Diagnosis Date    Atrial fibrillation     Hypertension     Irregular heart beat     Supraventricular tachycardia        History reviewed. No pertinent surgical history.    Social History: Patient lives with Brother in [place], does not state if he was driving.    Prior Intubation HX:  n/a    Modified Barium Swallow: none per EMR    MRI: 1. Multiple small acute infarctions in the left cerebellar hemisphere and in the right corona radiata.   2. Senescent changes and chronic microvascular ischemic changes.   This report was flagged in Epic as abnormal.     Chest X-Rays: Surgical changes are noted of the gastroesophageal junction..  There is no pleural effusion.  The trachea is midline.  The lungs are symmetrically expanded bilaterally with mildly coarse central hilar interstitial attenuation accentuated by shallow inspiratory effort and habitus..  No large focal consolidation seen.  There is no pneumothorax.     Prior diet: unrestricted diet     Occupation/hobbies/homemaking: pt reports 2 masters degrees in business administration    Subjective     Consult received for clinical swallow eval/speech/lang eval this date, SLP did communicate with RN prior to eval/treat.    Patient goals: " I need some water now."     Pain/Comfort:  Pain Rating 1: 0/10    Respiratory Status: room air    Objective:   Pt seen in room after OT eval.   RN reported that pt had passed Curtis overnight.   No diet order initiated. "     COGNITIVE STATUS:  Behavioral Observations: alert, appropriate, and distractible-  Memory:   Immediate: recalled immediate info that was presented to him from this clinician  Short Term: impaired  Long Term: fair   Orientation: oriented to self, place and where he resides  Attention: distracted .  Problem Solving: impaired       LANGUAGE:     Receptive Language: able to understand basic and personal info, did respond to questions when repeated back to him    1 Step Directions: followed oral motor commands, becomes confused with increased complexity of commands     Expressive Language: no aphasia, speech is impacted by dysarthric speech which reduced overall clarity of his message      Motor Speech: dysarthric speech marked by wet voice, oral motor weakness, poor diadochokinetic rate and precision when articulating    Voice: raspy and wet at times      Reading: DNT    Writing: DNT    Visual-Spatial: suspect some visual acuity deficits    Oral Musculature Evaluation  Oral Musculature: general weakness, facial asymmetry present  Dentition: edentulous (reports he has dentures at home)  Secretion Management: oral holding, left corner drooling  Mucosal Quality: adequate  Oral Labial Strength and Mobility: impaired retraction  Lingual Strength and Mobility: impaired strength  Buccal Strength and Mobility: decreased tone  Volitional Cough: elicited  Volitional Swallow: delayed oral and pharyngeal swallow  Voice Prior to PO Intake: raspy voice , wet at times    Bedside Swallow Eval:   Consistencies Assessed:  Water by tsp, water by open cup, water by straw   Pudding by tsp   Diced peaches by tsp fed by SLP     Oral Phase:   Anterior loss  Drooling  Excess chewing  Prolonged mastication  Slow oral transit time  Spitting out of food/liquid    Pharyngeal Phase:   decreased hyolaryngeal excursion to palpation  delayed swallow initation  multiple spontaneous swallows  throat clearing  wet vocal quality after  swallow    Compensatory Strategies  Finger sweep  Lingual sweep  Multiple swallows    Treatment: Pt to be followed while on acute for ST remediation to address, speech and cognition. No family in room to educate with above POC goals.  Diet recommendations and goals discussed with Primary MD, RN and patient via secure chat      Goals:   Multidisciplinary Problems       SLP Goals          Problem: SLP    Goal Priority Disciplines Outcome   SLP Goal     SLP Ongoing, Progressing   Description: Short Term Goals:  1. Pt will participate in ongoing swallow assessment to determine least restrictive diet.   2. Pt will tolerate FULL liquid diet with no audible s/s of dysphagia and good oral clearance.   3. Pt will consume 75% of FULL Liquid without overt s/s of aspiration given min assist.   4. Pt will implement safe swallowing strategies 100% of the time given min assist.   5. Patient will successfully participate in [id number] evaluation to further assess for any communication impairments s/p stroke  6. Pt will tolerate advanced trials of PO with no outward s/s of dysphagia.   7. Pt will state personal info with min cues.  8. Pt will perform OMEx with mod cues to increase oral motor coordination for speech and swallowing.   9. Pt will complete written and reading tasks to determine further goals.                               Plan:     Patient to be seen:  3 x/week   Plan of Care expires:  07/04/23  Plan of Care reviewed with:  patient   SLP Follow-Up:  Yes       Discharge recommendations:  Discharge Facility/Level of Care Needs:  (TBD, post acute -high intensity level of therapy)   Barriers to Discharge:  none    Time Tracking:     SLP Treatment Date:   06/05/23  Speech Start Time:  1111  Speech Stop Time:  1140     Speech Total Time (min):  29 min    Billable Minutes: Eval 16  and Eval Swallow and Oral Function 13    06/05/2023

## 2023-06-05 NOTE — CONSULTS
"NEUROLOGY FLOOR CONSULT    Reason for consult:  L cerebellar CVA, R corona radiata CVA, PAF on xarelto         Other sources of information : patient, ER records, and primary team    CC:  "Slurred speech"    HPI:   Dejuan Correa is a 78 y.o. year old male with past medical history significant for HLD, and alcohol use disorder with history of complicated withdrawal, paroxysmal AF, SVT, and HTN is here for the evaluation of slurred speech and LSW.     Per chart review, patient was noted to have slurred speech and LSW around 2PM by his son on the day of presentation prompting an ED visit. In the ED, patient was alert and oriented to time, place and person, was deemed not to be a candidate for tPA as he is on Xarelto. Initial BP was 187/94 with NS on EKG. Stroke work up revealed multiple small acute infarctions in the L cerebellar hemisphere and in the R corona radiata.     Patient's speech is significantly slurred on examination with L side drift, and sensory changes.     Histories:     Allergies:  Heparin analogues    Current Medications:    Current Facility-Administered Medications   Medication Dose Route Frequency Provider Last Rate Last Admin    acetaminophen tablet 650 mg  650 mg Oral Q6H PRN Criselda Martinez MD        [START ON 6/6/2023] aspirin EC tablet 81 mg  81 mg Oral Daily Criselda Martinez MD        atorvastatin tablet 40 mg  40 mg Oral Daily Criselda Martinez MD        iohexoL (OMNIPAQUE 350) injection 100 mL  100 mL Intravenous ONCE PRN Job Mendoza MD        labetaloL injection 5 mg  5 mg Intravenous Q4H PRN Criselda Martinez MD        pneumoc 20-richmond conj-dip cr(PF) (PREVNAR-20 (PF)) injection Syrg 0.5 mL  0.5 mL Intramuscular vaccine x 1 dose Shweta High MD        sodium chloride 0.9% flush 10 mL  10 mL Intravenous PRN Criselda Martinez MD           Past Medical/Surgical/Family History:  Medical:   Past Medical History:   Diagnosis Date    Atrial fibrillation     Hypertension     Irregular " heart beat     Supraventricular tachycardia       Surgeries: History reviewed. No pertinent surgical history.   Family: History reviewed. No pertinent family history.,         Current Evaluation:     Vital Signs:   Vitals:    06/05/23 1619   BP: (!) 173/84   Pulse: 79   Resp: 18   Temp: 96.8 °F (36 °C)          ORIENTATION: to self and place     LANGUAGE: 1=Mild to moderate aphasia; some obvious loss of fluency or facility of comprehension without significant limitation on ideas expressed or form of expression.    CRANIAL NERVES:  Blinks to threat bilaterally   Pupils equal and reactive to light   Facial asymmetry-LFD   Limited L EOM, barely able to cross midline      MOTOR:  Pronator drift: present    Acknowledges on R   Follows commands in all 4 extremities  R side 5/5   LUE able to lift, but pronator drift   LLE able to lift, but drifts   Sensory deficits on L side     CEREBELLAR/GAIT:  Deferred for the patients safety     LABORATORY STUDIES:  Recent Results (from the past 24 hour(s))   Comprehensive metabolic panel    Collection Time: 06/05/23  5:13 AM   Result Value Ref Range    Sodium 139 136 - 145 mmol/L    Potassium 3.7 3.5 - 5.1 mmol/L    Chloride 103 95 - 110 mmol/L    CO2 21 (L) 23 - 29 mmol/L    Glucose 108 70 - 110 mg/dL    BUN 13 8 - 23 mg/dL    Creatinine 1.1 0.5 - 1.4 mg/dL    Calcium 9.6 8.7 - 10.5 mg/dL    Total Protein 7.3 6.0 - 8.4 g/dL    Albumin 3.3 (L) 3.5 - 5.2 g/dL    Total Bilirubin 1.3 (H) 0.1 - 1.0 mg/dL    Alkaline Phosphatase 102 55 - 135 U/L    AST 16 10 - 40 U/L    ALT 6 (L) 10 - 44 U/L    Anion Gap 15 8 - 16 mmol/L    eGFR >60 >60 mL/min/1.73 m^2   Magnesium    Collection Time: 06/05/23  5:13 AM   Result Value Ref Range    Magnesium 1.3 (L) 1.6 - 2.6 mg/dL   Phosphorus    Collection Time: 06/05/23  5:13 AM   Result Value Ref Range    Phosphorus 3.4 2.7 - 4.5 mg/dL   Troponin I    Collection Time: 06/05/23  5:13 AM   Result Value Ref Range    Troponin I 0.070 (H) 0.000 - 0.026 ng/mL    POCT glucose    Collection Time: 06/05/23  5:13 AM   Result Value Ref Range    POCT Glucose 105 70 - 110 mg/dL   CBC auto differential    Collection Time: 06/05/23  5:14 AM   Result Value Ref Range    WBC 8.95 3.90 - 12.70 K/uL    RBC 4.04 (L) 4.60 - 6.20 M/uL    Hemoglobin 13.4 (L) 14.0 - 18.0 g/dL    Hematocrit 41.1 40.0 - 54.0 %     (H) 82 - 98 fL    MCH 33.2 (H) 27.0 - 31.0 pg    MCHC 32.6 32.0 - 36.0 g/dL    RDW 13.4 11.5 - 14.5 %    Platelets 267 150 - 450 K/uL    MPV 10.4 9.2 - 12.9 fL    Immature Granulocytes 0.3 0.0 - 0.5 %    Gran # (ANC) 6.4 1.8 - 7.7 K/uL    Immature Grans (Abs) 0.03 0.00 - 0.04 K/uL    Lymph # 1.7 1.0 - 4.8 K/uL    Mono # 0.7 0.3 - 1.0 K/uL    Eos # 0.1 0.0 - 0.5 K/uL    Baso # 0.07 0.00 - 0.20 K/uL    nRBC 0 0 /100 WBC    Gran % 71.4 38.0 - 73.0 %    Lymph % 19.0 18.0 - 48.0 %    Mono % 7.4 4.0 - 15.0 %    Eosinophil % 1.1 0.0 - 8.0 %    Basophil % 0.8 0.0 - 1.9 %    Differential Method Automated    Protime-INR    Collection Time: 06/05/23  5:14 AM   Result Value Ref Range    Prothrombin Time 13.1 (H) 9.0 - 12.5 sec    INR 1.2 0.8 - 1.2   Troponin I    Collection Time: 06/05/23  7:54 AM   Result Value Ref Range    Troponin I 0.045 (H) 0.000 - 0.026 ng/mL   Echo Saline Bubble? Yes    Collection Time: 06/05/23 10:22 AM   Result Value Ref Range    BSA 2.24 m2    TDI SEPTAL 0.05 m/s    LV LATERAL E/E' RATIO 9.17 m/s    LV SEPTAL E/E' RATIO 11.00 m/s    LA WIDTH 4.70 cm    IVC diameter 1.44 cm    Left Ventricular Outflow Tract Mean Velocity 0.61 cm/s    Left Ventricular Outflow Tract Mean Gradient 1.63 mmHg    TDI LATERAL 0.06 m/s    LVIDd 4.29 3.5 - 6.0 cm    IVS 1.69 (A) 0.6 - 1.1 cm    Posterior Wall 1.55 (A) 0.6 - 1.1 cm    LVIDs 2.97 2.1 - 4.0 cm    FS 31 28 - 44 %    LA volume 120.74 cm3    LV mass 290.14 g    LA size 4.85 cm    RVDD 3.82 cm    RV S' 0.02 cm/s    Left Ventricle Relative Wall Thickness 0.72 cm    AV mean gradient 2 mmHg    AV valve area 3.23 cm2    AV Velocity  Ratio 0.82     AV index (prosthetic) 0.87     MV mean gradient 1 mmHg    MV valve area p 1/2 method 2.74 cm2    MV valve area by continuity eq 2.91 cm2    E/A ratio 0.59     Mean e' 0.06 m/s    E wave deceleration time 322.24 msec    Pulm vein S/D ratio 1.25     LVOT diameter 2.17 cm    LVOT area 3.7 cm2    LVOT peak cliff 0.85 m/s    LVOT peak VTI 14.40 cm    Ao peak cliff 1.04 m/s    Ao VTI 16.5 cm    LVOT stroke volume 53.23 cm3    AV peak gradient 4 mmHg    MV peak gradient 6 mmHg    E/E' ratio 10.00 m/s    MV Peak E Cliff 0.55 m/s    TR Max Cliff 2.61 m/s    MV VTI 18.3 cm    MV stenosis pressure 1/2 time 80.24 ms    MV Peak A Cliff 0.93 m/s    PV Peak S Cliff 0.40 m/s    PV Peak D Cliff 0.32 m/s    LV Systolic Volume 34.13 mL    LV Systolic Volume Index 15.5 mL/m2    LV Diastolic Volume 82.76 mL    LV Diastolic Volume Index 37.62 mL/m2    LA Volume Index 54.9 mL/m2    LV Mass Index 132 g/m2    RA Major Axis 4.54 cm    Left Atrium Minor Axis 5.91 cm    Left Atrium Major Axis 6.59 cm    Triscuspid Valve Regurgitation Peak Gradient 27 mmHg    LA Volume Index (Mod) 35.4 mL/m2    LA volume (mod) 77.80 cm3    Guzman's Biplane MOD Ejection Fraction 6 %    Right Atrial Pressure (from IVC) 3 mmHg    EF 55 %    TV rest pulmonary artery pressure 30 mmHg       Thyroid normal  HgA1C%:  pending  Vit B12: 263  Folate:  3.2 Dec    RADIOLOGY STUDIES:  I have personally reviewed the images performed.     HEAD CT: Small hypodensity in the left cerebellar hemisphere compatible with an age-indeterminate lacunar infarction.  Otherwise no acute intracranial abnormality.         BRAIN MRI 1. Multiple small acute infarctions in the left cerebellar hemisphere and in the right corona radiata.  2. Senescent changes and chronic microvascular ischemic changes.      CTA H&N     1. Unremarkable CTA head and neck.  No large vessel occlusion or high-grade stenosis.  2. Atherosclerotic disease of the bilateral carotid bifurcations, and the bilateral  intracranial ICAs and vertebral arteries as above.  3. Known acute infarctions better evaluated on recently performed MRI.  4. Senescent changes and chronic microvascular ischemic changes.  5. Pulmonary artery dilation which can be seen with pulmonary hypertension.            1a  Level of consciousness: 0=alert; keenly responsive   1b. LOC questions:  0=Answers both tasks correctly   1c. LOC commands: 0=Answers both tasks correctly   2.  Best Gaze: 0=normal   3.  Visual: 0=No visual loss   4. Facial Palsy: 1=Partial paralysis   5a.  Motor left arm: 2=Some effort against gravity, limb cannot get to or maintain (if cured) 90 (or 45) degrees, drifts down to bed, but has some effort against gravity   5b.  Motor right arm: 0=No drift, limb holds 90 (or 45) degrees for full 10 seconds   6a. motor left le=Drift, limb holds 90 (or 45) degrees but drifts down before full 10 seconds: does not hit bed   6b  Motor right le=No drift, limb holds 90 (or 45) degrees for full 10 seconds   7. Limb Ataxia: 1=Present in one limb   8.  Sensory: 1=Mild to moderate sensory loss; patient feels pinprick is less sharp or is dull on the affected side; there is a loss of superficial pain with pinprick but patient is aware He is being touched   9. Best Language:  1=Mild to moderate aphasia; some obvious loss of fluency or facility of comprehension without significant limitation on ideas expressed or form of expression.   10. Dysarthria: 1=Mild to moderate, patient slurs at least some words and at worst, can be understood with some difficulty   11. Extinction and Inattention: 1=Visual, tactile, auditory, spatial or personal inattention or extinction to bilateral simultaneous stimulation in one of the sensory modalities (Unsure of this stroke is the cause, as the lesion is not justifiable)     Total:   9        Assessment:  BERENICE Cobiantung Correa is a 78 YOM with Hx of HLD, alcohol use disorder with history of complicated withdrawal,  paroxysmal AF(Xarelto), SVT, and HTN is here for the evaluation of slurred speech and LSW. NIHSS of 4 on presentation. MRI brain showed multiple small acute infarctions in the L cerebellar hemisphere and in the R corona radiata. CTA significant for Mod b/l IC ICAs  atherosclerosis. Bilateral carotid  bifurcation atherosclerosis without hemodynamically significant stenosis. Echo with sev LAE, and EF of 55%. On PE patient has L facial droop, and LSW with sensory deficit. He acknowledges examiner on R side, with limited L lateral gaze, which is concerning for L kurtis neglect; however stroke is not massive enough to cause neglect; discussed with primary team about the exam, which hasn't changed much from this early morning. The etiology of ischemic stroke is possibly cardio-embolic in origin, suspect med non-compliance in the setting of parox Afib(rate controlled, no thrombus on echo).     Recommendations   -  tPA candidate: No due to being on Xarelto   - Neurochecks q4 hrs  - Recommend serum glucose between 140 and 180  - Recommend permissive HTN with SBP goal < 220 and DBP < 110 x24 hrs, then miantain normotension   - Labetalol 10 mg IV PRN for BP above goal if HR >65 or Hydralazine 10 mg IV  for BP above goal if HR < 65.  Use Cardene gtt it BP refractory  - NPO until passes bedside swallow; if passes swallow screen, can have liquids and po meds. Wait for SLP swallow study to allow solid foods  - Anti-platelet therapy: Start PTA Aspirin. AC- Xarelto on hold. We will evaluate him again to determine the date to re-initiate AC  -Recommend obtaining CTH before initiation of Xarelto  - Atorvastatin 40 mg  - Labs: HgA1C pending, LDL- 68 TSH- normal. Replenish B12 and Folate  - DVT Prophylaxis: TEDs  -Consult PT/OT/ST, stroke nurse coordinator  -Stroke education provided      Differential diagnosis was explained to the patient. All questions were answered. Patient understood and agreed to adhere to plan.       Case discussed  with Dr. Leonardo       Will follow for additional input regarding management of current neurologic condition and monitor for any new signs/symptoms to suggest neurologic detrimental changes.     Appreciate the consult.       River Luque, PGY-IV  [facility]

## 2023-06-05 NOTE — PLAN OF CARE
Occupational therapy evaluation completed, skilled acute OT services indicated. Despite patient's co-morbidities, including CVA, patient was living in community setting functioning at Modified independence level for ADLs, and mobility prior to this event. There is an expectation of returning to prior level of function to maintain independence thus avoiding readmission. Patient's clinical condition meets full Inpatient Rehab (IPR) criteria; including the ability to actively participate in 3 hours of therapy. Patient with intact RUE strength, however, patient with LUE weakness and decreased coordination, mild scanning deficits, and dysarthria. Unable to perform full stand without mod assist x2. High intensity therapy recommended.     Problem: Occupational Therapy  Goal: Occupational Therapy Goal  Description: Goals to be met by: 7/3/2023     Patient will increase functional independence with ADLs by performing:    LE Dressing with Contact Guard Assistance with increased time.  Toileting from toilet with Modified independence and increased time for hygiene and clothing management.   Step transfer with Stand-by Assistance and AD as needed.  Toilet transfer to toilet with Stand-by Assistance and AD as needed.  Increase LUE proximal strength grossly to 3+/5.  100% reciprocation for patient education, CVA/BEFAST education, ADL/Mobility modifications, and visual scanning as appropriate.     Outcome: Ongoing, Progressing

## 2023-06-05 NOTE — PLAN OF CARE
Patient seen for physical therapy evaluation on this date.  Patient presenting with L hemibody weakness, L facial droop, decreased L sided scanning (possible field cut), poor standing balance, and poor endurance.  Patient will benefit from inpatient physical therapy to address limitations.  Patient will benefit from High Intensity therapy at discharge.  Patient was living in community setting functioning at Modified Independent level for ADLs, and mobility prior to this event.  There is an expectation of returning to prior level of function to maintain independence thus avoiding readmission.  Patient's clinical condition meets full Inpatient Rehab (IPR) criteria; including the ability to actively participate in 3 hours of therapy.  A lower level of care(SNF) cannot provide the interdisciplinary treatment approach needed.     Problem: Physical Therapy  Goal: Physical Therapy Goal  Description: Goals to be met by: 2023     Patient will increase functional independence with mobility by performin. Supine to sit with MInimal Assistance  2. Sit to supine with MInimal Assistance  3. Rolling to Left and Right with Minimal Assistance.  4. Sit to stand transfer with Minimal Assistance  5. Bed to chair transfer with Minimal Assistance using Rolling Walker  6. Gait  x 10 feet with Maximum Assistance using LRAD.  7. Stand for 3 minutes with Moderate Assistance using LRAD to perform a functional activity.      Outcome: Ongoing, Progressing

## 2023-06-05 NOTE — NURSING
Pt admit to unit floor. AAOx4. Suction set up with willie at BS.pt denies any complaints. Swallow eval completed with HOB up 90. Tolerated well no coughing no choking noted.

## 2023-06-05 NOTE — H&P
[facility] Medicine H&P Note     Admitting Team: [facility] Hospitalist Team B  Attending Physician: Reji  Resident: Michelle  Intern:     Date of Admit: 6/4/2023    Chief Complaint     Slurred speech and left sided weakness for 1 day- last seen normal 1pm.     Subjective:      History of Present Illness:  Dejuan Correa is a 78 y.o.  male who has a past medical history of paroxysmal Atrial fibrillation, Supraventricular tachycardia, HTN, HLD, and alcohol use disorder with hx of complicated withdrawal. The patient presented to [facility] on 6/4/2023 with a primary complaint of slurred speech and left sided weakness that was first noticed by his son at around 2 pm the day of admission. He had last been seen normal 1 hour prior to symptom onset.     Patient was hospitalized (5/26-5/29) for seizure like activity 2/2 severe alcohol use disorder with complicated withdrawal. He was also treated for PNA presumed aspiration wth rocephin and azithromycin and was discharged home with home health.     In ED: awake, alert, and oriented x4 / speech slurred but intelligible / slight L sided drift but no L sided weakness / Not a tpa candidate, on xarelto / HTNsive to 187/94, HR 80, EKG NS.     Patient states he had not noticed any weakness or slurred speech until his son told him his speech was abnormal, prompting him to present to the ED for evaluation.   He denies alcohol use following recent hospitalization (reports he has quit for good). Last drink ~ 7 days ago. No evidence of withdrawal upon my exam. He reports adherence to all prescribed medications, including xarelto and rate control for P-afib.     He denies palpitations, chest pain, shortness of breath, fever/chills/ nausea/vomiting, abdominal pain, dizziness, lightheadedness.    Past Medical History:  Past Medical History:   Diagnosis Date    Atrial fibrillation     Hypertension     Irregular heart beat     Supraventricular tachycardia    Alcohol use  disorder in early remission    Past Surgical History:  History reviewed. No pertinent surgical history.    Allergies:  Review of patient's allergies indicates:   Allergen Reactions    Heparin analogues        Home Medications:  Prior to Admission medications    Medication Sig Start Date End Date Taking? Authorizing Provider   carvediloL (COREG) 12.5 MG tablet Take 12.5 mg by mouth Daily.   Yes Historical Provider   hydroCHLOROthiazide (HYDRODIURIL) 12.5 MG Tab Take 12.5 mg by mouth once daily.   Yes Historical Provider   rivaroxaban (XARELTO ORAL) Take 20 mg by mouth Daily.   Yes Historical Provider   valsartan (DIOVAN) 40 MG tablet TAKE 1 TABLET (40 MG TOTAL) BY MOUTH ONCE DAILY. PT STATED TAKING 320MG ONCE DAILY  Patient taking differently: Take 40 mg by mouth once daily. 8/10/22  Yes Britney Cooper MD       Family History:  History reviewed. No pertinent family history.    Social History:  Social History     Tobacco Use    Smoking status: Former    Smokeless tobacco: Never   Substance Use Topics    Alcohol use: Not Currently     Comment: severe alcohol use disorder in early remission (7 days)       Review of Systems:  Review of Systems   Constitutional:  Negative for chills, fever and malaise/fatigue.   HENT:  Negative for hearing loss and tinnitus.    Eyes:  Negative for blurred vision, double vision, photophobia and pain.   Respiratory:  Negative for cough, hemoptysis, shortness of breath and wheezing.    Cardiovascular:  Negative for chest pain, palpitations, orthopnea and leg swelling.   Gastrointestinal:  Negative for abdominal pain, blood in stool, heartburn, nausea and vomiting.   Genitourinary:  Negative for dysuria.   Musculoskeletal:  Negative for falls.   Neurological:  Positive for speech change and focal weakness. Negative for dizziness, tremors, sensory change, seizures, loss of consciousness and headaches.       Health Maintaince :     Cancer Screening:  Colonoscopy: NUTD     Objective:   Last 24  "Hour Vital Signs:  BP  Min: 182/91  Max: 187/94  Temp  Av.5 °F (36.9 °C)  Min: 98.5 °F (36.9 °C)  Max: 98.5 °F (36.9 °C)  Pulse  Av  Min: 76  Max: 90  Resp  Av.5  Min: 18  Max: 20  SpO2  Av.5 %  Min: 93 %  Max: 96 %  Height  Av' 11" (180.3 cm)  Min: 5' 11" (180.3 cm)  Max: 5' 11" (180.3 cm)  Weight  Av.4 kg (221 lb 5.5 oz)  Min: 100.4 kg (221 lb 5.5 oz)  Max: 100.4 kg (221 lb 5.5 oz)  Body mass index is 30.87 kg/m².  No intake/output data recorded.    Physical Examination:  Physical Exam  Constitutional:       General: He is not in acute distress.     Appearance: He is not toxic-appearing.   HENT:      Head: Normocephalic and atraumatic.      Mouth/Throat:      Mouth: Mucous membranes are moist.      Pharynx: Oropharynx is clear.   Eyes:      General: No scleral icterus.     Extraocular Movements: Extraocular movements intact.      Conjunctiva/sclera: Conjunctivae normal.      Pupils: Pupils are equal, round, and reactive to light.   Cardiovascular:      Rate and Rhythm: Normal rate and regular rhythm.      Pulses: Normal pulses.      Heart sounds: Normal heart sounds.   Pulmonary:      Effort: Pulmonary effort is normal. No respiratory distress.      Breath sounds: Normal breath sounds. No wheezing or rales.   Abdominal:      General: Abdomen is flat. Bowel sounds are normal. There is no distension.      Palpations: Abdomen is soft.      Tenderness: There is no abdominal tenderness. There is no guarding or rebound.   Musculoskeletal:         General: No swelling.      Cervical back: No rigidity.   Skin:     General: Skin is warm and dry.      Capillary Refill: Capillary refill takes less than 2 seconds.      Coloration: Skin is not jaundiced.      Findings: No lesion or rash.     Neuro:  Alert, responds appropriately, oriented to person place, time and situation, follows commands  CN: Facial movement symmetric and intact, no facial droop noted, tongue midline, shoulder shrug intact, " Extra-ocular movements intact bilaterally, pupils equal round and reactive. No nystagmus noted. Denies changes in vision.  Extremity Strength: RUE 5, LUE 5, RLE 5, LLE 4    Reflexes: 2+ globally  Drift: slight L sided drift upper and lower extremities  Speech is slurred but intelligible, no aphasia  Sensory: intact to light touch bilaterally    Laboratory:  Most Recent Data:  CBC:   Lab Results   Component Value Date    WBC 10.06 06/04/2023    HGB 13.5 (L) 06/04/2023    HCT 41.6 06/04/2023     06/04/2023     (H) 06/04/2023    RDW 13.5 06/04/2023     BMP:   Lab Results   Component Value Date     06/04/2023    K 3.9 06/04/2023     06/04/2023    CO2 26 06/04/2023    BUN 14 06/04/2023    CREATININE 1.2 06/04/2023     (H) 06/04/2023    CALCIUM 9.0 06/04/2023    MG 1.6 05/28/2023    PHOS 4.2 05/27/2023     LFTs:   Lab Results   Component Value Date    PROT 6.7 06/04/2023    ALBUMIN 3.4 (L) 06/04/2023    BILITOT 1.2 (H) 06/04/2023    AST 16 06/04/2023    ALKPHOS 103 06/04/2023    ALT 6 (L) 06/04/2023     Coags:   Lab Results   Component Value Date    INR 1.2 06/04/2023     FLP:   Lab Results   Component Value Date    CHOL 116 (L) 05/27/2023    HDL 31 (L) 05/27/2023    LDLCALC 68.0 05/27/2023    TRIG 85 05/27/2023    CHOLHDL 26.7 05/27/2023     DM:   Lab Results   Component Value Date    LDLCALC 68.0 05/27/2023    CREATININE 1.2 06/04/2023     Thyroid:   Lab Results   Component Value Date    TSH 2.920 06/04/2023     Cardiac:   Lab Results   Component Value Date    TROPONINI <0.006 05/26/2023     (H) 05/26/2023     Urinalysis:   Lab Results   Component Value Date    LABURIN No significant growth 05/26/2023    COLORU Yellow 05/26/2023    SPECGRAV 1.020 05/26/2023    NITRITE Negative 05/26/2023    KETONESU Trace (A) 05/26/2023    UROBILINOGEN 4.0-6.0 (A) 05/26/2023    WBCUA 24 (H) 05/26/2023       Trended Lab Data:  Recent Labs   Lab 05/29/23  0549 06/04/23  1635   WBC 10.18 10.06   HGB  11.6* 13.5*   HCT 35.2* 41.6    295   * 101*   RDW 14.0 13.5    141   K 3.4* 3.9    103   CO2 27 26   BUN 20 14   CREATININE 1.7* 1.2   GLU 92 118*   PROT 5.2* 6.7   ALBUMIN 2.6* 3.4*   BILITOT 1.5* 1.2*   AST 19 16   ALKPHOS 94 103   ALT 5* 6*       Microbiology Data:      Other Results:  EKG (my interpretation): NS rhythm, rate 80, no significant change from previous    Radiology:  Imaging Results               MRI Brain Without Contrast (Final result)  Result time 06/04/23 19:07:21      Final result by Gurdeep Cho DO (06/04/23 19:07:21)                   Impression:      1. Multiple small acute infarctions in the left cerebellar hemisphere and in the right corona radiata.  2. Senescent changes and chronic microvascular ischemic changes.  This report was flagged in Epic as abnormal.    Dr. Gurdeep Cho discussed critical findings with Dr. Nyasia Cho by telephone at 19:04 on 06/04/2023.      Electronically signed by: Gurdeep Cho  Date:    06/04/2023  Time:    19:07               Narrative:    EXAMINATION:  MRI BRAIN WITHOUT CONTRAST    CLINICAL HISTORY:  Stroke, follow up;    TECHNIQUE:  Multiplanar multisequence MR imaging of the brain was performed without intravenous contrast.    COMPARISON:  CT head from earlier the same date.    FINDINGS:  There is motion artifact.    There are several small foci of restricted diffusion in the left cerebellar hemisphere and in the right corona radiata, compatible with acute infarctions.  There is associated T2/FLAIR hyperintense signal.  There is also a moderate volume of T2/FLAIR hyperintense signal in the supratentorial/periventricular white matter compatible with chronic microvascular ischemic change.  There is age-related brain parenchymal volume loss and prominence of the CSF spaces.  There is no hydrocephalus.  There is no intracranial mass or hemorrhage.  No extra-axial blood or fluid collections. Normal vascular flow voids.    Bone  marrow signal intensity is normal. Paranasal sinuses and mastoid air cells are clear.                                       CT Head Without Contrast (Final result)  Result time 06/04/23 17:06:19      Final result by Gurdeep Cho DO (06/04/23 17:06:19)                   Impression:      Small hypodensity in the left cerebellar hemisphere compatible with an age-indeterminate lacunar infarction.  Otherwise no acute intracranial abnormality.  Further evaluation with MRI as clinically indicated.      Electronically signed by: Gurdeep Cho  Date:    06/04/2023  Time:    17:06               Narrative:    EXAMINATION:  CT HEAD WITHOUT CONTRAST    CLINICAL HISTORY:  Neuro deficit, acute, stroke suspected;    TECHNIQUE:  Low dose axial CT images obtained throughout the head without intravenous contrast. Sagittal and coronal reconstructions were performed.    COMPARISON:  CT head from 05/26/2023.    FINDINGS:  There is age-related brain parenchymal volume loss and prominence of the CSF spaces compatible with senescent/involutional changes.  No extra-axial blood or fluid collections.  There is a small hypodensity in the left cerebellar hemisphere compatible with an age-indeterminate lacunar infarction.  There is hypoattenuation in the supratentorial white matter compatible with chronic microvascular ischemic changes.  No parenchymal mass, hemorrhage, edema or major vascular distribution infarct.    No calvarial fracture.  The scalp is unremarkable.  Bilateral paranasal sinuses and mastoid air cells are clear.                                         Assessment:     Dejuan Correa is a 78 y.o.  male who has a past medical history of paroxysmal Atrial fibrillation, Supraventricular tachycardia, HTN, HLD, and alcohol use disorder with hx of complicated withdrawal. The patient presented to [facility] on 6/4/2023 with a primary complaint of slurred speech and left sided weakness the afternoon of admission,  found to have multiple small acute L cerebellar and R corona radiata infarcts on MRI brain.      Plan:     Acute cerebellar CVA  - slurred speech + mild L sided weakness, L pronator drift  - NIH scale:  - not TPA candidate, on xarelto for P-afib  - CT head: age indeterminate hypodensity L cerebellum  - MRI brain: multiple small acute infarctions in the left cerebellar hemisphere and in the right corona radiata.  - multifocal CVA suggests embolic etiology- has rate controlled P-afib adherent to xarelto  Plan  - permissive /120, labetalol 5 IV PRN   - maintain o2 sats>94%  - monitor on telemetry  - neuro checks Q4  - maintain glucose 140-180  - keep NPO pending formal speech eval  - PT/OT evaluation  - s/p ASA suppository, start daily ASA/high intensity statin pending speech evaluation  - hold xarelto pending neuro recs     Paroxysmal A-fib  - reported adherent to coreg and xarelto, multifocal CVA raises concern for thromboembolic source  - NS with normal rate since admission  - monitor on telemetry  - hold home coreg and xarelto in setting of acute CVA, labetalol PRN for HR >120     HTN   - home meds: coreg 12.5 daily, HCTZ 12.5, valsartan 40 vs 320 mg  - permissive HTN in setting of acute CVA, labetalol 5 IV prn for SBP>220, DBP> 120     HLD  - not on statin  - recent lipid panel with decreased total cholesterol and HDL w/ normal triglycerides and LDL  - start high intensity statin pending speech eval    Severe alcohol use disorder in early remission with hx complicated withdrawal  - reports no alcohol use since hospital discharge (last drink >7 days)  - ethanol <10 on admission  - no clinical evidence of acute withdrawal at this time  - monitor on tele, CIWA Q6    Macrocytic anemia  - mild anemia, MCV>100, likely 2/2 alcohol use   - obtain b12 and folic acid      Code Status:     FULL    Criselda Martinez MD  [facility] HO-2    [facility] Hospitalist Pager numbers:   [facility] Hospitalist Medicine  Team A (Anila/Priscilla): [phone]  [facility] Medicine Team B (Reji/Samina):  [phone]

## 2023-06-05 NOTE — PLAN OF CARE
Problem: Adult Inpatient Plan of Care  Goal: Plan of Care Review  Outcome: Ongoing, Progressing     VIRTUAL NURSE:  Cued into patient's room.  Permission received per patient to turn camera to view patient.  Introduced as VN for night shift that will be working with floor nurse and nursing assistant.  Educated patient on VN's role in patient care and  VIP model.  Plan of care reviewed with patient.  Education per flowsheet.   Informed patient that staff will round on them every 2 hours but to use call light for any other needs they may have; informed of fall risk and fall precautions.  Patient verbalized understanding.  Call light within reach; bed siderails up x2.  Opportunity given for questions and questions answered.  Admission assessment questions answered.  Patient denies complaints or any needs at this time. Instructed to call for assistance.  Will cont to monitor and intervene as needed.    Labs, notes, orders, and careplan reviewed.       06/05/23 0020   Patient Request   Patient Requested slurred speech; requesting urinal   Nurse Notification   Bedside Nurse Notified? Yes   Name of Bedside Nurse LUIS Gipson   Nurse Notfication Method Secure Chat   Nurse Notified Of Patient Request   Admission   Initial VN Admission Questions Complete   Communication Issues? None   Shift   Virtual Nurse - Rounding Complete   Pain Management Interventions pain management plan reviewed with patient/caregiver   Virtual Nurse - Patient Verbalized Approval Of Camera Use;VN Rounding   Type of Frequent Check   Type Patient Rounds   Safety/Activity   Patient Rounds bed in low position;placement of personal items at bedside;bed wheels locked;call light in patient/parent reach;visualized patient;clutter free environment maintained   Safety Promotion/Fall Prevention assistive device/personal item within reach;bed alarm set;diversional activities provided;Fall Risk reviewed with patient/family;Fall Risk signage in place;high risk  medications identified;medications reviewed;nonskid shoes/socks when out of bed;room near unit station;side rails raised x 2;supervised activity;instructed to call staff for mobility   Safety Precautions emergency equipment at bedside   Positioning   Body Position neutral body alignment   Head of Bed (HOB) Positioning HOB at 60 degrees   Pain/Comfort/Sleep   Preferred Pain Scale number (Numeric Rating Pain Scale)   Comfort/Acceptable Pain Level 0   Pain Rating (0-10): Rest 0   Sleep/Rest/Relaxation no problem identified;awake

## 2023-06-05 NOTE — NURSING
Pt AAOx4, informed of ordered NPO status. Pt voice complete understanding, stated he will comply. Assigned PCT made aware.

## 2023-06-05 NOTE — PLAN OF CARE
ST wu completed, pt may start FULL Liquid diet for now, whole meds one by one.   See note for details. Pt will benefit from High Intensity Therapy Post Acute Care

## 2023-06-05 NOTE — ED PROVIDER NOTES
Encounter Date: 6/4/2023       History     Chief Complaint   Patient presents with    Fatigue     Slurred speech and left sided weakness - last seen normal 1pm. On arrival, awake, alert. Speech clear but slightly slurred. Facial muscles symmetrical. Able to lift and hold all extremities with slight drift on left arm and leg.      History obtained from the patient without limitations.  78-year-old with the below past medical history who lives with his brother.  He presents from home by ambulance.  His brother activated emergency services for slurred speech.  Last known normal was 13:00.  The patient has no complaints and denies headache, dizziness, vision changes, chest pain, shortness of breath, palpitations, abdominal pain, nausea, vomiting, and weakness and numbness throughout his body.  He denies history of stroke.  He denies drug and alcohol use.      Review of patient's allergies indicates:   Allergen Reactions    Heparin analogues      Past Medical History:   Diagnosis Date    Atrial fibrillation     Hypertension     Irregular heart beat     Supraventricular tachycardia      History reviewed. No pertinent surgical history.  History reviewed. No pertinent family history.  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  Remainder of 10 point systems review negative.    Physical Exam     Initial Vitals   BP Pulse Resp Temp SpO2   06/04/23 1631 06/04/23 1629 06/04/23 1629 06/04/23 2300 06/04/23 1629   (!) 187/94 90 20 98.5 °F (36.9 °C) 96 %      MAP       --                Physical Exam    Nursing note and vitals reviewed.  Constitutional: He is not diaphoretic. No distress.   HENT:   Head: Normocephalic and atraumatic.   Mouth/Throat: Oropharynx is clear and moist.   Eyes: Conjunctivae and EOM are normal. Pupils are equal,  round, and reactive to light. No scleral icterus.   No nystagmus.   Neck: No JVD present.   No carotid bruits.   Cardiovascular:  Normal rate, regular rhythm and normal heart sounds.     Exam reveals no gallop and no friction rub.       No murmur heard.  Pulmonary/Chest: Effort normal and breath sounds normal. No stridor. No respiratory distress. He has no decreased breath sounds. He has no wheezes. He has no rhonchi. He has no rales.   Abdominal: Abdomen is soft. He exhibits no distension. There is no abdominal tenderness.     Neurological: He is alert and oriented to person, place, and time. He has normal strength. GCS score is 15. GCS eye subscore is 4. GCS verbal subscore is 5. GCS motor subscore is 6.   Dysarthric speech.  No facial asymmetry.  5/5 strength throughout all extremities.  Normal touch sensation throughout the face and extremities.  Normal coordination.   Skin: Skin is warm and dry. No pallor.       ED Course   Procedures  Labs Reviewed   CBC W/ AUTO DIFFERENTIAL - Abnormal; Notable for the following components:       Result Value    RBC 4.12 (*)     Hemoglobin 13.5 (*)      (*)     MCH 32.8 (*)     Gran # (ANC) 7.8 (*)     Immature Grans (Abs) 0.05 (*)     Gran % 77.8 (*)     Lymph % 12.9 (*)     All other components within normal limits   COMPREHENSIVE METABOLIC PANEL - Abnormal; Notable for the following components:    Glucose 118 (*)     Albumin 3.4 (*)     Total Bilirubin 1.2 (*)     ALT 6 (*)     All other components within normal limits   ISTAT PROCEDURE - Abnormal; Notable for the following components:    POC PTINR 1.3 (*)     All other components within normal limits   PROTIME-INR   TSH   ALCOHOL,MEDICAL (ETHANOL)   ISTAT CREATININE        ECG Results              ECG 12 lead (Final result)  Result time 06/05/23 20:56:45      Final result by Interface, Lab In [facility] (06/05/23 20:56:45)                   Narrative:    Test Reason : I63.9,    Vent. Rate : 092 BPM     Atrial Rate :  092 BPM     P-R Int : 124 ms          QRS Dur : 088 ms      QT Int : 362 ms       P-R-T Axes : 055 -11 015 degrees     QTc Int : 447 ms    Sinus rhythm with occasional Premature ventricular complexes  Minimal voltage criteria for LVH, may be normal variant  Nonspecific ST abnormality  Abnormal ECG  When compared with ECG of 26-MAY-2023 15:21,  Fusion complexes are no longer Present  ST no longer depressed in Lateral leads  Confirmed by Roberto Howard MD ([id number]) on 6/5/2023 8:56:34 PM    Referred By: [email or web address]   SELF           Confirmed By:Roberto Howard MD                                  Imaging Results               MRI Brain Without Contrast (Final result)  Result time 06/04/23 19:07:21      Final result by Gurdeep Cho DO (06/04/23 19:07:21)                   Impression:      1. Multiple small acute infarctions in the left cerebellar hemisphere and in the right corona radiata.  2. Senescent changes and chronic microvascular ischemic changes.  This report was flagged in Epic as abnormal.    Dr. Gurdeep Cho discussed critical findings with Dr. Nyasia Cho by telephone at 19:04 on 06/04/2023.      Electronically signed by: Gurdeep Cho  Date:    06/04/2023  Time:    19:07               Narrative:    EXAMINATION:  MRI BRAIN WITHOUT CONTRAST    CLINICAL HISTORY:  Stroke, follow up;    TECHNIQUE:  Multiplanar multisequence MR imaging of the brain was performed without intravenous contrast.    COMPARISON:  CT head from earlier the same date.    FINDINGS:  There is motion artifact.    There are several small foci of restricted diffusion in the left cerebellar hemisphere and in the right corona radiata, compatible with acute infarctions.  There is associated T2/FLAIR hyperintense signal.  There is also a moderate volume of T2/FLAIR hyperintense signal in the supratentorial/periventricular white matter compatible with chronic microvascular ischemic change.  There is age-related brain parenchymal volume loss  and prominence of the CSF spaces.  There is no hydrocephalus.  There is no intracranial mass or hemorrhage.  No extra-axial blood or fluid collections. Normal vascular flow voids.    Bone marrow signal intensity is normal. Paranasal sinuses and mastoid air cells are clear.                                       CT Head Without Contrast (Final result)  Result time 06/04/23 17:06:19      Final result by Gurdeep Cho DO (06/04/23 17:06:19)                   Impression:      Small hypodensity in the left cerebellar hemisphere compatible with an age-indeterminate lacunar infarction.  Otherwise no acute intracranial abnormality.  Further evaluation with MRI as clinically indicated.      Electronically signed by: Gurdeep Cho  Date:    06/04/2023  Time:    17:06               Narrative:    EXAMINATION:  CT HEAD WITHOUT CONTRAST    CLINICAL HISTORY:  Neuro deficit, acute, stroke suspected;    TECHNIQUE:  Low dose axial CT images obtained throughout the head without intravenous contrast. Sagittal and coronal reconstructions were performed.    COMPARISON:  CT head from 05/26/2023.    FINDINGS:  There is age-related brain parenchymal volume loss and prominence of the CSF spaces compatible with senescent/involutional changes.  No extra-axial blood or fluid collections.  There is a small hypodensity in the left cerebellar hemisphere compatible with an age-indeterminate lacunar infarction.  There is hypoattenuation in the supratentorial white matter compatible with chronic microvascular ischemic changes.  No parenchymal mass, hemorrhage, edema or major vascular distribution infarct.    No calvarial fracture.  The scalp is unremarkable.  Bilateral paranasal sinuses and mastoid air cells are clear.                                       Medications   magnesium sulfate 2g in water 50mL IVPB (premix) (0 g Intravenous Stopped 6/5/23 1015)   iohexoL (OMNIPAQUE 350) injection 100 mL (100 mLs Intravenous Given 6/5/23 1714)   barium  sulfate (VARIBAR THIN LIQUID) oral powder 60 mL (60 mLs Oral Given 6/6/23 1259)   barium sulfate (VARIBAR NECTAR) oral suspension 50 mL (50 mLs Oral Given 6/6/23 1258)   barium sulfate (VARIBAR PUDDING) oral paste 50 mL (50 mLs Oral Given 6/6/23 1257)   magnesium sulfate 2g in water 50mL IVPB (premix) (0 g Intravenous Stopped 6/8/23 1023)   azithromycin tablet 500 mg (500 mg Oral Given 6/11/23 0944)   magnesium sulfate 2g in water 50mL IVPB (premix) (0 g Intravenous Stopped 6/9/23 1230)   potassium bicarbonate disintegrating tablet 50 mEq (50 mEq Oral Given 6/9/23 1025)   magnesium sulfate 2g in water 50mL IVPB (premix) (0 g Intravenous Stopped 6/12/23 1456)                Attending Attestation:         Attending Critical Care:   Critical Care Times:   Direct Patient Care (initial evaluation, reassessments, and time considering the case)................................................................25 minutes.   Ordering, Reviewing, and Interpreting Diagnostic Studies...............................................................................................................5 minutes.   Documentation..................................................................................................................................................................................8 minutes.   Consultation with other Physicians. .................................................................................................................................................2 minutes.   ==============================================================  Total Critical Care Time - exclusive of procedural time: 40 minutes.  ==============================================================  Critical care was necessary to treat or prevent imminent or life-threatening deterioration of the following conditions: stroke.   Critical care was time spent personally by me on the following activities: obtaining history from patient or  relative, examination of patient, ordering lab, x-rays, and/or EKG, development of treatment plan with patient or relative, discussion with consultants, interpretation of cardiac measurements and re-evaluation of patient's conition.   Critical Care Condition: potentially life-threatening   Critical Care Comments:     Emergent evaluation.  Stroke activation on arrival.  No acute findings on head CT.  Blood pressure elevated on arrival.  It was not emergently lowered due to concern for ischemic stroke.  Normal white count, electrolytes, renal function, TSH, and white blood cell count on lab review.  Further evaluation MRI revealed acute cerebellar stroke.  Patient admitted to [facility] for further evaluation and management.         ED Course as of 06/16/23 1402   Sun Jun 04, 2023   2851 Teleconference with the vascular neurology provider.  No acute process on CT head.  Patient recently admitted for alcohol issues.  He is on Xarelto and not a candidate for thrombolytics.  Recommends MRI. [LP]      ED Course User Index  [LP] Ilan Del Angel III, MD                 Clinical Impression:   Final diagnoses:  [I63.9] Cerebellar stroke - Left (Primary)        ED Disposition Condition    Observation Stable                Ilan Del Angel III, MD  06/16/23 140

## 2023-06-05 NOTE — PLAN OF CARE
SW met with pt at bedside to complete assessment. Pt is AxO X3  and able to verbally answer assessment questions. SW noted pt to have had admission previously. Pt was noted to have had capacity exam and determined pt able to confirm own demographic information and make medical decisions. Pt reports to live at home with his brother. Pt reports while at home using DME to include cane and rolling walker. Pt is also supported by his adult daughter who lives in [place]. At time of discharge pt is open to post acute placement. DAYNE updated whiteboard with [facility] name and contact information. SW confirmed pt understanding of Observation unit and expected discharge plan. SW will continue to follow pt throughout care and assist with any discharge needs.         06/05/23 1232   Discharge Planning   Assessment Type Discharge Planning Brief Assessment   Resource/Environmental Concerns none   Support Systems Family members;Children   Equipment Currently Used at Home walker, rolling   Current Living Arrangements home   Patient/Family Anticipates Transition to home with family   Patient/Family Anticipated Services at Transition rehabilitation services   DME Needed Upon Discharge  none   Discharge Plan A Rehab       Future Appointments   Date Time Provider Department Center   6/14/2023  1:30 PM Luzmaria Ribeiro MD [facility] Case Management  [phone]

## 2023-06-05 NOTE — NURSING
Dr Martinez at pt's BS, made aware of BP 2300 on admit 182/91 HR 78. MD stated she placed parameters call with SBP > 200. Pt denies any complaints.

## 2023-06-05 NOTE — PT/OT/SLP EVAL
Occupational Therapy   Evaluation and Treatment    Name: Dejuan Correa  MRN: [id number]  Admitting Diagnosis: Cerebellar stroke  Recent Surgery: * No surgery found *      Recommendations:     Discharge Recommendations: other (see comments) (high intensity occupational therapy)  Discharge Equipment Recommendations:  other (see comments) (defer to post acute care)  Barriers to discharge:  Other (Comment) (unable to ambulate; high fall risk; assist for all ADLs)    Assessment:     Dejuan Correa is a 78 y.o. male with a medical diagnosis of Cerebellar stroke.  He presents with ..The primary encounter diagnosis was Cerebellar stroke. Diagnoses of Stroke and Acute CVA (cerebrovascular accident) were also pertinent to this visit. . Performance deficits affecting function: weakness, impaired endurance, impaired self care skills, impaired functional mobility, decreased coordination, visual deficits, impaired balance, gait instability, decreased upper extremity function, decreased lower extremity function, abnormal tone, impaired fine motor, impaired coordination, decreased ROM, impaired sensation, impaired cognition, pain.      Occupational therapy evaluation completed, skilled acute OT services indicated. Despite patient's co-morbidities, including CVA, patient was living in community setting functioning at Modified independence level for ADLs, and mobility prior to this event. There is an expectation of returning to prior level of function to maintain independence thus avoiding readmission. Patient's clinical condition meets full Inpatient Rehab (IPR) criteria; including the ability to actively participate in 3 hours of therapy. Patient with intact RUE strength, however, patient with LUE weakness and decreased coordination, mild scanning deficits, and dysarthria. Unable to perform full stand without mod assist x2. High intensity therapy recommended.     Rehab Prognosis: Good; patient would benefit from acute skilled OT  services to address these deficits and reach maximum level of function.       Plan:     Patient to be seen 5 x/week to address the above listed problems via self-care/home management, therapeutic activities, therapeutic exercises, neuromuscular re-education  Plan of Care Expires: 07/03/23  Plan of Care Reviewed with: patient    Subjective     Chief Complaint: tingling in BLE  Patient/Family Comments/goals: Patient reported wanting to understand what happened and how to move forward    Occupational Profile:  Living Environment: Patient resides with brother in single story home with a ramp to the back entrance. Tub/shower head; in process of building a second bathroom with walk-in shower  Previous level of function: Recently hospitalized at this hospital 5/26/2023 - 5/29/2023 for severe alcohol withdrawal and was discharged home. Prior to admission, patient was independence to modified independence in ADLs/IADLs. Use of 3-point cane as needed. Recently received and was using RW immediately prior to current admission.  Roles and Routines: Patient is retired and worked as an   Equipment Used at Home: walker, rolling, shower chair (HurryCane (3 point cane))  Assistance upon Discharge: Patient lives with brother, but unknown level of physical assist able to be provided.    Pain/Comfort:  Pain Rating 1: 0/10  Pain Addressed 1: Nurse notified, Pre-medicate for activity  Pain Rating Post-Intervention 1: 0/10      Objective:     Communicated with: nursing prior to session.  Patient found HOB elevated with telemetry, bed alarm, Condom Catheter upon OT entry to room.    General Precautions: Standard, fall, aspiration  Orthopedic Precautions: N/A  Braces: N/A  Respiratory Status: Room air    Occupational Performance:    Bed Mobility:    Patient completed Scooting/Bridging with minimum assistance  Patient completed Supine to Sit with moderate assistance  Patient completed Sit to Supine with moderate  assistance    Functional Mobility/Transfers:  Patient completed Sit <> Stand Transfer with moderate assistance and of 2 persons  with  no assistive device   Functional Mobility: Lateral side-step/scoots along EOB with moderate assist with sequence cues. Unable to take a forward step.    Activities of Daily Living:  Lower Body Dressing: contact guard assist to doff sock; moderate assist to don socks; wears sneakers and loafers at home; infer up to maximum assistance for comprehensive dressing from bed level.    Cognitive/Visual Perceptual:  Cognitive/Psychosocial Skills:     -       Oriented to: Person, Place, and Time   -       Follows Commands/attention:Follows two-step commands  -       Communication: dysarthria  -       Memory: Functional cognition intact  -       Safety awareness/insight to disability: slight impaired insight to disability    -       Mood/Affect/Coping skills/emotional control: Appropriate to situation and Cooperative  Visual/Perceptual:      -Impaired  saccades ; mild L side scanning deficits     Physical Exam:  Balance:    -       Sitting Balance: 2+ (supports self with one UE) on [facility] Sitting Balance Scale  Postural examination/scapula alignment:    -       Slight lateral Left lean  Skin integrity: Visible skin intact  Sensation:    -       Impaired  light/touch Lower extremities  Dominant hand:    -       Right hand dominant  Upper Extremity Range of Motion:     -       Right Upper Extremity: WFL  -       Left Upper Extremity: Shoulder flexion: 110 degrees; distal UE WFL  Upper Extremity Strength:    -       Right Upper Extremity: WFL  -       Left Upper Extremity: Proximal: 2+/5; distal: 3-3+/5   Strength:    -       Right Upper Extremity: WFL  -       Left Upper Extremity: Deficits: weaker compared to right side; gross grasp intact  Fine Motor Coordination:    -       Impaired  Left hand thumb/finger opposition skills  , Left hand, diadochokinesis skill (slower than right  hand), and Right hand, diadochokinesis skill    Gross motor coordination:   Slower gross movement in LUE compared to RUE    AMPA 6 Click ADL:  AMPA Total Score: 13    Treatment & Education:  OT POC developed and assessment completed. Patient verbalized own occupational profile. Patient required cueing to verbalize current hospitalization/situation (was stating situation of prior hospitalization from 5/26/2023 - 5/29/2023). Patient guided to EOB with moderate assistance. Patient required contact guard assist to moderate assist for partial LB dressing routine as noted above. Patient performed sit to stand with moderate assist x2 persons. Patient completed 4 sets of 1 step lateral side-step/scoots from EOB with moderate assistance and sequence cueing. Patient attempt second sit to stand with moderate assist x1 person but unable to complete full stand. Patient return back to supine with moderate assistance. Mild left sided scanning deficits noted.     Patient left HOB elevated with all lines intact, call button in reach, bed alarm on, nursing notified, and SLP present    GOALS:   Multidisciplinary Problems       Occupational Therapy Goals          Problem: Occupational Therapy    Goal Priority Disciplines Outcome Interventions   Occupational Therapy Goal     OT, PT/OT Ongoing, Progressing    Description: Goals to be met by: 7/3/2023     Patient will increase functional independence with ADLs by performing:    LE Dressing with Contact Guard Assistance with increased time.  Toileting from toilet with Modified independence and increased time for hygiene and clothing management.   Step transfer with Stand-by Assistance and AD as needed.  Toilet transfer to toilet with Stand-by Assistance and AD as needed.  Increase LUE proximal strength grossly to 3+/5.  100% reciprocation for patient education, CVA/BEFAST education, ADL/Mobility modifications, and visual scanning as appropriate.                          History:     Past  Medical History:   Diagnosis Date    Atrial fibrillation     Hypertension     Irregular heart beat     Supraventricular tachycardia        History reviewed. No pertinent surgical history.    Time Tracking:     OT Date of Treatment: 06/05/23  OT Start Time: 1035  OT Stop Time: 1113  OT Total Time (min): 38 min    Billable Minutes:Evaluation 8 min  Self Care/Home Management 10 min  Therapeutic Activity 20 min    6/5/2023

## 2023-06-05 NOTE — PROGRESS NOTES
"    [facility] Medicine Progress Note    Primary Team: [facility] [place] Hospitalist Team B  Attending Physician: Reji  Resident: Lisa  Intern: [facility] Day: 1    Subjective:      Patient seen and examined by me this morning. Attempted to go down for CTA but there were issues with peripheral IV location. Brought back to room for replacement and waiting to return to radiology for CTA.    No complaints at this time. Denies f/c/n/v, sob, headache, cp.       Objective:   Last 24 Hour Vital Signs:  BP  Min: 171/95  Max: 187/94  Temp  Av.3 °F (36.8 °C)  Min: 98 °F (36.7 °C)  Max: 98.5 °F (36.9 °C)  Pulse  Av.5  Min: 76  Max: 90  Resp  Av.2  Min: 18  Max: 20  SpO2  Av.8 %  Min: 93 %  Max: 96 %  Height  Av' 11" (180.3 cm)  Min: 5' 11" (180.3 cm)  Max: 5' 11" (180.3 cm)  Weight  Av.4 kg (221 lb 5.5 oz)  Min: 100.4 kg (221 lb 5.5 oz)  Max: 100.4 kg (221 lb 5.5 oz)    Intake/Output Summary (Last 24 hours) at 2023 0729  Last data filed at 2023 0606  Gross per 24 hour   Intake 0 ml   Output 450 ml   Net -450 ml      Physical Examination:  Physical Exam  Vitals and nursing note reviewed.   Constitutional:       Appearance: Normal appearance.   HENT:      Head: Normocephalic and atraumatic.      Right Ear: External ear normal.      Left Ear: External ear normal.      Nose: Nose normal. No congestion or rhinorrhea.      Mouth/Throat:      Mouth: Mucous membranes are moist.      Pharynx: Oropharynx is clear.   Eyes:      Pupils: Pupils are equal, round, and reactive to light.   Cardiovascular:      Rate and Rhythm: Normal rate and regular rhythm.   Pulmonary:      Effort: Pulmonary effort is normal.      Breath sounds: Normal breath sounds. No wheezing or rales.   Abdominal:      General: Abdomen is flat. Bowel sounds are normal.      Palpations: Abdomen is soft.   Musculoskeletal:      Right lower leg: No edema.      Left lower leg: No edema.   Skin:     Capillary Refill: Capillary refill " takes less than 2 seconds.      Findings: No erythema or rash.   Neurological:      Mental Status: He is alert.      Comments: See detailed neuro exam below   Psychiatric:         Mood and Affect: Mood normal.         Behavior: Behavior normal.        NEUROLOGICAL    MENTAL STATUS: AAOx3, memory intact, fund of knowledge appropriate  LANGUAGE/SPEECH: Naming and repetition intact, fluent, follows 3-step commands    CRANIAL NERVES:  II: Pupils equal and reactive, no RAPD, no VF deficits  III, IV, VI: Questionable palsy of horizontal gaze bilaterally, no gaze preference or deviation, no nystagmus.  V: normal sensation in V1, V2, and V3 segments bilaterally  VII: no asymmetry, mild nasolabial flattening Left  VIII: normal hearing to speech  IX, X: normal palatal elevation, no uvular deviation  XI: 5/5 head turn and 5/5 shoulder shrug bilaterally  XII: midline tongue protrusion    MOTOR:    Muscle bulk and tone are normal.        Deltoid (C5) Bicep (C5-C6) Tricep (C6-C7) Wrist Extensor (C6-C7) Finger Flexion (C8) Finger Abduction (T1) Thumb Opposition (C8-T1)   Left 4 4 4 4 4 5 4   Right 5 5 5 5 5 5 5          Hip Flexion (L2-L3) Hip Extension (L4-L5) Hip Adduction (L2,L3,L4) Hip Abduction (L4, L5, S1) Knee Extension (L3-L4) Knee Flexion (L5-S1) Ankle Dorsiflexion (L4-L5) Ankle Plantarflexion (S1-S2) EHL (L5)   Left 4 4 4 5 5 5 5 5 5   Right 5 5 5 5 5 5 5 5 5       Deep Tendon Reflexes:       Bicep (C5-C6) Brachioradialis   (C5-C6) Tricep   (C7-C8) Patellar   (L2-L3) Achilles   (S1) Gambino Babinski   Left 3+ 3+ 3+ 3+ 2+ - -   Right 3+ 3+ 3+ 2+ 2+ - -       Sensory: Light touch, position sense are intact in fingers and toes.    Coordination:  There is no dysmetria on finger-to-nose and heel-knee-shin. There are no abnormal or extraneous movements.    Gait/Stance:  Posture is normal       NIH Stroke Scale    1a. Level of consciousness 0=alert; keenly responsive   1b. LOC questions 0=Answers both tasks correctly   1c. LOC  commands 0=Answers both tasks correctly   2. Best gaze 0=normal   3. Visual 0=No visual loss   4.  Facial palsy 1=Minor paralysis (flattened nasolabial fold, asymmetric on smiling)   5.  Motor left arm 0=No drift, limb holds 90 (or 45) degrees for full 10 seconds   5b. Motor right arm 0=No drift, limb holds 90 (or 45) degrees for full 10 seconds   6a. Motor left leg 2=Some effort against gravity, limb cannot get to or maintain (if cured) 90 (or 45) degrees, drifts down to bed, but has some effort against gravity   6b. Motor right leg 0=No drift, limb holds 90 (or 45) degrees for full 10 seconds   7. Limb ataxia 0=Absent   8. Sensory 0=Normal; no sensory loss   9. Best language 0=No aphasia, normal   10. Dysarthria 1=Mild to moderate, patient slurs at least some words and at worst, can be understood with some difficulty   11.  Extinction and inattention 0=No abnormality                                   TOTAL: 4       Laboratory:  Recent Labs   Lab 06/04/23  1635 06/05/23  0513 06/05/23  0514   WBC 10.06  --  8.95   HGB 13.5*  --  13.4*   HCT 41.6  --  41.1     --  267   *  --  102*   RDW 13.5  --  13.4    139  --    K 3.9 3.7  --     103  --    CO2 26 21*  --    BUN 14 13  --    CREATININE 1.2 1.1  --    * 108  --    PROT 6.7 7.3  --    ALBUMIN 3.4* 3.3*  --    BILITOT 1.2* 1.3*  --    AST 16 16  --    ALKPHOS 103 102  --    ALT 6* 6*  --        No results for input(s): COLORU, CLARITYU, SPECGRAV, PHUR, PROTEINUA, GLUCOSEU, BILIRUBINCON, BLOODU, WBCU, RBCU, BACTERIA, MUCUS, NITRITE, LEUKOCYTESUR, UROBILINOGEN, HYALINECASTS in the last 24 hours.    Microbiology Results (last 7 days)       ** No results found for the last 168 hours. **             I have personally reviewed the above laboratory studies.     Imaging:  EKG (my interpretation): No new today    MRI Brain Without Contrast   Final Result   Abnormal      1. Multiple small acute infarctions in the left cerebellar hemisphere  and in the right corona radiata.   2. Senescent changes and chronic microvascular ischemic changes.   This report was flagged in Epic as abnormal.      Dr. Gurdeep Cho discussed critical findings with Dr. Nyasia Cho by telephone at 19:04 on 06/04/2023.         Electronically signed by: Gurdeep Cho   Date:    06/04/2023   Time:    19:07      CT Head Without Contrast   Final Result      Small hypodensity in the left cerebellar hemisphere compatible with an age-indeterminate lacunar infarction.  Otherwise no acute intracranial abnormality.  Further evaluation with MRI as clinically indicated.         Electronically signed by: Gurdeep Cho   Date:    06/04/2023   Time:    17:06      CTA Head and Neck (xpd)    (Results Pending)       Current Medications:  Scheduled:   [START ON 6/6/2023] aspirin  81 mg Oral Daily    atorvastatin  40 mg Oral Daily    magnesium sulfate IVPB  2 g Intravenous Once     Infusions:    PRN:  acetaminophen, labetalol, pneumoc 20-richmond conj-dip cr(PF), sodium chloride 0.9%    Assessment:     Dejuan Correa is a 78 y.o.  male who has a past medical history of paroxysmal Atrial fibrillation, Supraventricular tachycardia, HTN, HLD, and alcohol use disorder with hx of complicated withdrawal. The patient presented to [facility] on 6/4/2023 with a primary complaint of slurred speech and left sided weakness the afternoon of admission, found to have multiple small acute L cerebellar and R corona radiata infarcts on MRI brain. Neurology consulted, awaiting recs.    Plan:     Acute cerebellar CVA  - slurred speech + mild L sided weakness, L pronator drift in patient on Xarelto for pAfib  - NIH scale:  - not TPA candidate, on xarelto for P-afib  - CT head: age indeterminate hypodensity L cerebellum  - MRI brain: multiple small acute infarctions in the left cerebellar hemisphere and in the right corona radiata.  - multifocal CVA suggests embolic etiology- has rate controlled P-afib  adherent to Xarelto  - TIA ABCD2 score = 6  Plan:  - Neurology consulted, awaiting recs  - CTA ordered   - A1c in process, TSH wnl, B12 in process, Folate in process  - Lipid panel with HDL 31, LDL 68  - Frequent neuro-checks (q4h)  - Permissive HTN for 24h (220/120)  - NPO until bedside swallow eval, SLP eval  - Head of bed > 30 degrees for aspiration prevention and aspiration precautions ordered.  -Atorvastatin 40 mg daily (long-term goal LDL < 70)  -Tight glucose control (BG between 140-180, long-term goal HgbA1c < 6%)  -Stroke education and counseling  -Physical therapy, occupational therapy, speech therapy consults      Paroxysmal A-fib  - reported adherent to coreg and xarelto, multifocal CVA raises concern for thromboembolic source  - NS with normal rate since admission  Plan:  - monitor on telemetry  - hold home coreg and xarelto in setting of acute CVA, labetalol PRN for HR >120     HTN   - home meds: coreg 12.5 daily, HCTZ 12.5, valsartan 40 vs 320 mg  Plan:  - permissive HTN in setting of acute CVA, labetalol 5 IV prn for SBP>220, DBP> 120 for 24hrs     HLD  - not on statin  - Lipid panel with HDL 31, LDL 68  Plan:  -Atorvastatin 40 mg daily (long-term goal LDL < 70)     Severe alcohol use disorder in early remission with hx complicated withdrawal  - reports no alcohol use since hospital discharge (last drink >7 days)  - ethanol <10 on admission  - no clinical evidence of acute withdrawal at this time  Plan:  - monitor on tele, CIWA Q6     Macrocytic anemia  - mild anemia, MCV>100, likely 2/2 alcohol use   Plan:  - obtain b12 and folic acid      Diet: Diet NPO   DVT Prophylaxis: SCDs until neuro eval for LMWH  Code: Full Code     Dispo: Pending neuro eval for stroke        Jimbo Dean MD  [employer] Neurology PGY-1    [facility] Hospitalist Pager numbers:   [facility] Hospitalist Medicine Team A (Anila/Priscilla): [phone]  [facility] Hospitalist Medicine Team B (Reji/Samina):  [phone]

## 2023-06-05 NOTE — PLAN OF CARE
SW noted pt to be seen by therapy.Once notes are in pt to be reviewed by [facility].     SW spoke with pt brother Usman to confirm discharge dispo. Pt brother reports wanting IPR as well for pt. Pt brother reports able to meet with pt at bedside tomorrow at 10am; SW will inform IPR staff.      06/05/23 1243   Post-Acute Status   Post-Acute Authorization Placement   Post-Acute Placement Status Referrals Sent   Discharge Delays None known at this time   Discharge Plan   Discharge Plan A Rehab       Future Appointments   Date Time Provider Department Center   6/14/2023  1:30 PM Luzmaria Ribeiro MD [facility]don[facility] Case Management  [phone]

## 2023-06-06 LAB
ALBUMIN SERPL BCP-MCNC: 3.2 G/DL (ref 3.5–5.2)
ALP SERPL-CCNC: 95 U/L (ref 55–135)
ALT SERPL W/O P-5'-P-CCNC: 7 U/L (ref 10–44)
ANION GAP SERPL CALC-SCNC: 14 MMOL/L (ref 8–16)
AST SERPL-CCNC: 17 U/L (ref 10–40)
BASOPHILS # BLD AUTO: 0.08 K/UL (ref 0–0.2)
BASOPHILS NFR BLD: 0.8 % (ref 0–1.9)
BILIRUB SERPL-MCNC: 1.6 MG/DL (ref 0.1–1)
BUN SERPL-MCNC: 13 MG/DL (ref 8–23)
CALCIUM SERPL-MCNC: 9.6 MG/DL (ref 8.7–10.5)
CHLORIDE SERPL-SCNC: 102 MMOL/L (ref 95–110)
CO2 SERPL-SCNC: 23 MMOL/L (ref 23–29)
CREAT SERPL-MCNC: 1.1 MG/DL (ref 0.5–1.4)
DIFFERENTIAL METHOD: ABNORMAL
EOSINOPHIL # BLD AUTO: 0.1 K/UL (ref 0–0.5)
EOSINOPHIL NFR BLD: 0.6 % (ref 0–8)
ERYTHROCYTE [DISTWIDTH] IN BLOOD BY AUTOMATED COUNT: 13.4 % (ref 11.5–14.5)
EST. GFR  (NO RACE VARIABLE): >60 ML/MIN/1.73 M^2
ESTIMATED AVG GLUCOSE: 103 MG/DL (ref 68–131)
GLUCOSE SERPL-MCNC: 88 MG/DL (ref 70–110)
HBA1C MFR BLD: 5.2 % (ref 4–5.6)
HCT VFR BLD AUTO: 40.6 % (ref 40–54)
HGB BLD-MCNC: 13 G/DL (ref 14–18)
IMM GRANULOCYTES # BLD AUTO: 0.04 K/UL (ref 0–0.04)
IMM GRANULOCYTES NFR BLD AUTO: 0.4 % (ref 0–0.5)
LYMPHOCYTES # BLD AUTO: 1.6 K/UL (ref 1–4.8)
LYMPHOCYTES NFR BLD: 16.4 % (ref 18–48)
MCH RBC QN AUTO: 32.7 PG (ref 27–31)
MCHC RBC AUTO-ENTMCNC: 32 G/DL (ref 32–36)
MCV RBC AUTO: 102 FL (ref 82–98)
MONOCYTES # BLD AUTO: 0.7 K/UL (ref 0.3–1)
MONOCYTES NFR BLD: 6.8 % (ref 4–15)
NEUTROPHILS # BLD AUTO: 7.2 K/UL (ref 1.8–7.7)
NEUTROPHILS NFR BLD: 75 % (ref 38–73)
NRBC BLD-RTO: 0 /100 WBC
PLATELET # BLD AUTO: 245 K/UL (ref 150–450)
PMV BLD AUTO: 10.5 FL (ref 9.2–12.9)
POCT GLUCOSE: 120 MG/DL (ref 70–110)
POCT GLUCOSE: 121 MG/DL (ref 70–110)
POCT GLUCOSE: 98 MG/DL (ref 70–110)
POTASSIUM SERPL-SCNC: 4.1 MMOL/L (ref 3.5–5.1)
PROT SERPL-MCNC: 6.9 G/DL (ref 6–8.4)
RBC # BLD AUTO: 3.97 M/UL (ref 4.6–6.2)
SODIUM SERPL-SCNC: 139 MMOL/L (ref 136–145)
WBC # BLD AUTO: 9.58 K/UL (ref 3.9–12.7)

## 2023-06-06 PROCEDURE — 25000003 PHARM REV CODE 250: Performed by: STUDENT IN AN ORGANIZED HEALTH CARE EDUCATION/TRAINING PROGRAM

## 2023-06-06 PROCEDURE — 27000221 HC OXYGEN, UP TO 24 HOURS

## 2023-06-06 PROCEDURE — 99233 SBSQ HOSP IP/OBS HIGH 50: CPT | Mod: ,,, | Performed by: PSYCHIATRY & NEUROLOGY

## 2023-06-06 PROCEDURE — 85025 COMPLETE CBC W/AUTO DIFF WBC: CPT | Performed by: STUDENT IN AN ORGANIZED HEALTH CARE EDUCATION/TRAINING PROGRAM

## 2023-06-06 PROCEDURE — 97112 NEUROMUSCULAR REEDUCATION: CPT | Mod: CQ

## 2023-06-06 PROCEDURE — 83036 HEMOGLOBIN GLYCOSYLATED A1C: CPT | Performed by: STUDENT IN AN ORGANIZED HEALTH CARE EDUCATION/TRAINING PROGRAM

## 2023-06-06 PROCEDURE — 97535 SELF CARE MNGMENT TRAINING: CPT

## 2023-06-06 PROCEDURE — 99233 PR SUBSEQUENT HOSPITAL CARE,LEVL III: ICD-10-PCS | Mod: ,,, | Performed by: PSYCHIATRY & NEUROLOGY

## 2023-06-06 PROCEDURE — 92611 MOTION FLUOROSCOPY/SWALLOW: CPT

## 2023-06-06 PROCEDURE — A9698 NON-RAD CONTRAST MATERIALNOC: HCPCS | Performed by: INTERNAL MEDICINE

## 2023-06-06 PROCEDURE — 97530 THERAPEUTIC ACTIVITIES: CPT | Mod: CQ

## 2023-06-06 PROCEDURE — 97530 THERAPEUTIC ACTIVITIES: CPT

## 2023-06-06 PROCEDURE — 94761 N-INVAS EAR/PLS OXIMETRY MLT: CPT

## 2023-06-06 PROCEDURE — 97112 NEUROMUSCULAR REEDUCATION: CPT

## 2023-06-06 PROCEDURE — 36415 COLL VENOUS BLD VENIPUNCTURE: CPT | Performed by: STUDENT IN AN ORGANIZED HEALTH CARE EDUCATION/TRAINING PROGRAM

## 2023-06-06 PROCEDURE — 25000003 PHARM REV CODE 250

## 2023-06-06 PROCEDURE — 11000001 HC ACUTE MED/SURG PRIVATE ROOM

## 2023-06-06 PROCEDURE — 25500020 PHARM REV CODE 255: Performed by: INTERNAL MEDICINE

## 2023-06-06 PROCEDURE — 80053 COMPREHEN METABOLIC PANEL: CPT | Performed by: STUDENT IN AN ORGANIZED HEALTH CARE EDUCATION/TRAINING PROGRAM

## 2023-06-06 RX ORDER — VALSARTAN 40 MG/1
40 TABLET ORAL DAILY
Status: DISCONTINUED | OUTPATIENT
Start: 2023-06-06 | End: 2023-06-12 | Stop reason: HOSPADM

## 2023-06-06 RX ORDER — CARVEDILOL 12.5 MG/1
12.5 TABLET ORAL DAILY
Status: DISCONTINUED | OUTPATIENT
Start: 2023-06-07 | End: 2023-06-06

## 2023-06-06 RX ORDER — CARVEDILOL 12.5 MG/1
12.5 TABLET ORAL DAILY
Status: DISCONTINUED | OUTPATIENT
Start: 2023-06-06 | End: 2023-06-12 | Stop reason: HOSPADM

## 2023-06-06 RX ADMIN — RIVAROXABAN 20 MG: 20 TABLET, FILM COATED ORAL at 04:06

## 2023-06-06 RX ADMIN — BARIUM SULFATE 60 ML: 0.81 POWDER, FOR SUSPENSION ORAL at 12:06

## 2023-06-06 RX ADMIN — CARVEDILOL 12.5 MG: 12.5 TABLET, FILM COATED ORAL at 04:06

## 2023-06-06 RX ADMIN — VALSARTAN 40 MG: 40 TABLET, FILM COATED ORAL at 04:06

## 2023-06-06 RX ADMIN — ATORVASTATIN CALCIUM 40 MG: 40 TABLET, FILM COATED ORAL at 09:06

## 2023-06-06 RX ADMIN — ASPIRIN 81 MG: 81 TABLET, COATED ORAL at 09:06

## 2023-06-06 NOTE — PLAN OF CARE
Continue OT POC. Minimal progression in functional mobility. Impaired left sided weakness including orally as noted by lack ability to appropriately management food and secretions on left side. Difficulty with initiating feeding as evidenced by food in room untouched, but receptive to guided self-feeding techniques. Slight increase in tone in LUE compared to prior session.  Recommend High Intensity Occupational Therapy upon medically stable.     Problem: Occupational Therapy  Goal: Occupational Therapy Goal  Description: Goals to be met by: 7/3/2023     Patient will increase functional independence with ADLs by performing:    Self feeding with distant supervision in upright seated position with adaptations as needed (goal added 6/6/2023).  LE Dressing with Contact Guard Assistance with increased time.  Toileting from toilet with Modified independence and increased time for hygiene and clothing management.   Step transfer with Stand-by Assistance and AD as needed.  Toilet transfer to toilet with Stand-by Assistance and AD as needed.  Increase LUE proximal strength grossly to 3+/5.  100% reciprocation for patient education, CVA/BEFAST education, ADL/Mobility modifications, and visual scanning as appropriate.     Outcome: Ongoing, Progressing

## 2023-06-06 NOTE — PROGRESS NOTES
"NEUROLOGY FLOOR CONSULT    Reason for consult:  L cerebellar CVA, R corona radiata CVA, PAF on xarelto         Other sources of information : patient, ER records, and primary team    CC:  "Slurred speech"      SUBJECTIVE   Patient was seen and examined at the bedside this morning. He is alert and oriented to time, place and person. He is participating in physical therapy, able to look on the left and acknowledging on the examiner the left. Overnight, he had 6 beat of  Vtach, symptomatic-Xarelto was on hold from 2 days in the light of recent stroke, but can be resumed today.     HPI:   Dejuan Correa is a 78 y.o. year old male with past medical history significant for HLD, and alcohol use disorder with history of complicated withdrawal, paroxysmal AF, SVT, and HTN is here for the evaluation of slurred speech and LSW.     Per chart review, patient was noted to have slurred speech and LSW around 2PM by his son on the day of presentation prompting an ED visit. In the ED, patient was alert and oriented to time, place and person, was deemed not to be a candidate for tPA as he is on Xarelto. Initial BP was 187/94 with NS on EKG. Stroke work up revealed multiple small acute infarctions in the L cerebellar hemisphere and in the R corona radiata.     Patient's speech is significantly slurred on examination with L side drift, and sensory changes.     Histories:     Allergies:  Heparin analogues    Current Medications:    Current Facility-Administered Medications   Medication Dose Route Frequency Provider Last Rate Last Admin    acetaminophen tablet 650 mg  650 mg Oral Q6H PRN Criselda Martinez MD        aspirin EC tablet 81 mg  81 mg Oral Daily Criselda Martinez MD   81 mg at 06/06/23 0909    atorvastatin tablet 40 mg  40 mg Oral Daily Criselda Martinez MD   40 mg at 06/06/23 0909    carvediloL tablet 12.5 mg  12.5 mg Oral Daily Davida Cervantes MD        labetaloL injection 5 mg  5 mg Intravenous Q4H PRN Criselda Martinez MD   "      pneumoc 20-richmond conj-dip cr(PF) (PREVNAR-20 (PF)) injection Syrg 0.5 mL  0.5 mL Intramuscular vaccine x 1 dose Shweta High MD        rivaroxaban tablet 20 mg  20 mg Oral Daily with dinner Dave Moss MD        sodium chloride 0.9% flush 10 mL  10 mL Intravenous PRN Criselda Martinez MD        valsartan tablet 40 mg  40 mg Oral Daily Davida Cervantes MD           Past Medical/Surgical/Family History:  Medical:   Past Medical History:   Diagnosis Date    Atrial fibrillation     Hypertension     Irregular heart beat     Supraventricular tachycardia       Surgeries: History reviewed. No pertinent surgical history.   Family: History reviewed. No pertinent family history.,         Current Evaluation:     Vital Signs:   Vitals:    06/06/23 1215   BP:    Pulse: 94   Resp:    Temp:           ORIENTATION: to self place and person     LANGUAGE: 1=Mild to moderate aphasia; some obvious loss of fluency or facility of comprehension without significant limitation on ideas expressed or form of expression.    CRANIAL NERVES:  Blinks to threat bilaterally   Pupils equal and reactive to light   Facial asymmetry-LFD   No kurtis-neglect noticed today      MOTOR:  Pronator drift: present    Follows commands in all 4 extremities  R side 5/5   LUE able to lift, but pronator drift   LLE able to lift, but drifts   Sensory deficits on L side     CEREBELLAR/GAIT:  Deferred for the patients safety     LABORATORY STUDIES:  Recent Results (from the past 24 hour(s))   Comprehensive metabolic panel    Collection Time: 06/06/23  4:39 AM   Result Value Ref Range    Sodium 139 136 - 145 mmol/L    Potassium 4.1 3.5 - 5.1 mmol/L    Chloride 102 95 - 110 mmol/L    CO2 23 23 - 29 mmol/L    Glucose 88 70 - 110 mg/dL    BUN 13 8 - 23 mg/dL    Creatinine 1.1 0.5 - 1.4 mg/dL    Calcium 9.6 8.7 - 10.5 mg/dL    Total Protein 6.9 6.0 - 8.4 g/dL    Albumin 3.2 (L) 3.5 - 5.2 g/dL    Total Bilirubin 1.6 (H) 0.1 - 1.0 mg/dL    Alkaline Phosphatase 95 55 - 135  U/L    AST 17 10 - 40 U/L    ALT 7 (L) 10 - 44 U/L    Anion Gap 14 8 - 16 mmol/L    eGFR >60 >60 mL/min/1.73 m^2   CBC auto differential    Collection Time: 06/06/23  4:39 AM   Result Value Ref Range    WBC 9.58 3.90 - 12.70 K/uL    RBC 3.97 (L) 4.60 - 6.20 M/uL    Hemoglobin 13.0 (L) 14.0 - 18.0 g/dL    Hematocrit 40.6 40.0 - 54.0 %     (H) 82 - 98 fL    MCH 32.7 (H) 27.0 - 31.0 pg    MCHC 32.0 32.0 - 36.0 g/dL    RDW 13.4 11.5 - 14.5 %    Platelets 245 150 - 450 K/uL    MPV 10.5 9.2 - 12.9 fL    Immature Granulocytes 0.4 0.0 - 0.5 %    Gran # (ANC) 7.2 1.8 - 7.7 K/uL    Immature Grans (Abs) 0.04 0.00 - 0.04 K/uL    Lymph # 1.6 1.0 - 4.8 K/uL    Mono # 0.7 0.3 - 1.0 K/uL    Eos # 0.1 0.0 - 0.5 K/uL    Baso # 0.08 0.00 - 0.20 K/uL    nRBC 0 0 /100 WBC    Gran % 75.0 (H) 38.0 - 73.0 %    Lymph % 16.4 (L) 18.0 - 48.0 %    Mono % 6.8 4.0 - 15.0 %    Eosinophil % 0.6 0.0 - 8.0 %    Basophil % 0.8 0.0 - 1.9 %    Differential Method Automated    Hemoglobin A1c    Collection Time: 06/06/23  4:39 AM   Result Value Ref Range    Hemoglobin A1C 5.2 4.0 - 5.6 %    Estimated Avg Glucose 103 68 - 131 mg/dL   POCT glucose    Collection Time: 06/06/23 11:58 AM   Result Value Ref Range    POCT Glucose 120 (H) 70 - 110 mg/dL       Thyroid normal  HgA1C%:  pending  Vit B12: 263  Folate:  3.2 Dec    RADIOLOGY STUDIES:  I have personally reviewed the images performed.     HEAD CT: Small hypodensity in the left cerebellar hemisphere compatible with an age-indeterminate lacunar infarction.  Otherwise no acute intracranial abnormality.         BRAIN MRI 1. Multiple small acute infarctions in the left cerebellar hemisphere and in the right corona radiata.  2. Senescent changes and chronic microvascular ischemic changes.      CTA H&N     1. Unremarkable CTA head and neck.  No large vessel occlusion or high-grade stenosis.  2. Atherosclerotic disease of the bilateral carotid bifurcations, and the bilateral intracranial ICAs and  vertebral arteries as above.  3. Known acute infarctions better evaluated on recently performed MRI.  4. Senescent changes and chronic microvascular ischemic changes.  5. Pulmonary artery dilation which can be seen with pulmonary hypertension.         1a  Level of consciousness: 0=alert; keenly responsive   1b. LOC questions:  0=Answers both tasks correctly   1c. LOC commands: 0=Answers both tasks correctly   2.  Best Gaze: 0=normal   3.  Visual: 0=No visual loss   4. Facial Palsy: 1=Minor paralysis (flattened nasolabial fold, asymmetric on smiling)   5a.  Motor left arm: 1=Drift, limb holds 90 (or 45) degrees but drifts down before full 10 seconds: does not hit bed   5b.  Motor right arm: 0=No drift, limb holds 90 (or 45) degrees for full 10 seconds   6a. motor left le=Drift, limb holds 90 (or 45) degrees but drifts down before full 10 seconds: does not hit bed   6b  Motor right le=No drift, limb holds 90 (or 45) degrees for full 10 seconds   7. Limb Ataxia: 1=Present in one limb   8.  Sensory: 1=Mild to moderate sensory loss; patient feels pinprick is less sharp or is dull on the affected side; there is a loss of superficial pain with pinprick but patient is aware He is being touched   9. Best Language:  1=Mild to moderate aphasia; some obvious loss of fluency or facility of comprehension without significant limitation on ideas expressed or form of expression.   10. Dysarthria: 1=Mild to moderate, patient slurs at least some words and at worst, can be understood with some difficulty   11. Extinction and Inattention: 0=No abnormality    Total:   7           Assessment:  BERENICE Correa is a 78 YOM with Hx of HLD, alcohol use disorder with history of complicated withdrawal, paroxysmal AF(Xarelto), SVT, and HTN is here for the evaluation of slurred speech and LSW. NIHSS of 4 on presentation. MRI brain showed multiple small acute infarctions in the L cerebellar hemisphere and in the R corona radiata. CTA  significant for Mod b/l IC ICAs  atherosclerosis. Bilateral carotid  bifurcation atherosclerosis without hemodynamically significant stenosis. Echo with sev LAE, and EF of 55%. On PE patient has L facial droop, and LSW with sensory deficit. L kurtis-neglect improved today. Xarelto to be resumed from today. The etiology of ischemic stroke is possibly cardio-embolic in origin, suspect med non-compliance in the setting of parox Afib(rate controlled, no thrombus on echo).     Recommendations   -  tPA candidate: No, due to being on Xarelto   - Neurochecks q4 hrs  - Recommend serum glucose between 140 and 180  - Recommend  SBP <160 in-house and <140 long term  - Anti-platelet therapy: Continue PTA Aspirin, and Xarelto for A fib- Counseled extensively on alcohol cessation as he is at the high risk of bleed with a fall   - Atorvastatin 40 mg  - Labs: HgA1C 5.2 LDL- 68 TSH- normal. Replenish B12 and Folate  - DVT Prophylaxis: TEDs  -Consult PT/OT/ST, stroke nurse coordinator  -Stroke education provided      Differential diagnosis was explained to the patient. All questions were answered. Patient understood and agreed to adhere to plan.       Case discussed with Dr. Patiño     We will sign off. Please call us if you have any questions or concerns. Thank you for the consult.          River Luque, PGY-IV  [employer] Neurology

## 2023-06-06 NOTE — PT/OT/SLP PROGRESS
Physical Therapy Treatment    Patient Name:  Dejuan Correa   MRN:  [id number]    Recommendations:     Discharge Recommendations: other (see comments) (High Intensity Therapy Placement)  Discharge Equipment Recommendations: other (see comments) (Defer to next level of care)  Barriers to discharge:  decreased functional mobility requiring increased assistance at this time    Assessment:     Dejuan Correa is a 78 y.o. male admitted with a medical diagnosis of Cerebellar stroke.  He presents with the following impairments/functional limitations: weakness, impaired endurance, impaired self care skills, impaired functional mobility, gait instability, impaired balance, decreased coordination, decreased lower extremity function, decreased upper extremity function, decreased safety awareness, decreased ROM, abnormal tone, impaired fine motor, impaired coordination Pt would continue to benefit from P.T. To address impairments listed above.    Patient seen for physical therapy evaluation on this date.  Patient presenting with L hemibody weakness, L facial droop, decreased L sided scanning (possible field cut), poor standing balance, and poor endurance.  Patient will benefit from inpatient physical therapy to address limitations.  Patient will benefit from High Intensity therapy at discharge.  Patient was living in community setting functioning at Modified Independent level for ADLs, and mobility prior to this event.  There is an expectation of returning to prior level of function to maintain independence thus avoiding readmission.  Patient's clinical condition meets full Inpatient Rehab (IPR) criteria; including the ability to actively participate in 3 hours of therapy.  A lower level of care(SNF) cannot provide the interdisciplinary treatment approach needed.   .    Rehab Prognosis: Fair; patient would benefit from acute skilled PT services to address these deficits and reach maximum level of function.    Recent Surgery: *  No surgery found *      Plan:     During this hospitalization, patient to be seen 6 x/week to address the identified rehab impairments via gait training, therapeutic activities, therapeutic exercises, neuromuscular re-education and progress toward the following goals:    Plan of Care Expires:  07/05/23    Subjective       Patient/Family Comments/goals: Pt agreed to tx  Pain/Comfort:  Pain Rating 1: 0/10  Pain Rating Post-Intervention 1: 0/10      Objective:     Communicated with RN prior to session.  Patient found supine with bed alarm, telemetry, peripheral IV upon PT entry to room.     General Precautions: Standard, fall, aspiration  Orthopedic Precautions: N/A  Braces: N/A  Respiratory Status: Room air     Functional Mobility:  Bed Mobility:     Rolling Left:  contact guard assistance with vc's to reach with right hand for b/r for assist  Scooting: contact guard assistance, minimum assistance to EOB with Jason left hip at end.  Supine to Sit: moderate assistance  Sit to Supine: minimum assistance and LLE onto EOB  Transfers:     Sit to Stand:  1st and 2nd bouts, Max A of 1/CGA of another with RW; 3rd bout without A.D. and Max A of 1 and Mod A of another with LUE on OTs shoulder to decrease RUE push to left and R foot blocked;  4th bout, PTA on pt's right side with LUE on PTA's shoulder and PTA blocking R foot and RLE to prevent RLE sliding out to the right in extension  Balance: sitting fair-/fair, standing poor      AM-PAC 6 CLICK MOBILITY  Turning over in bed (including adjusting bedclothes, sheets and blankets)?: 3  Sitting down on and standing up from a chair with arms (e.g., wheelchair, bedside commode, etc.): 2  Moving from lying on back to sitting on the side of the bed?: 2  Moving to and from a bed to a chair (including a wheelchair)?: 2  Need to walk in hospital room?: 1  Climbing 3-5 steps with a railing?: 1  Basic Mobility Total Score: 11       Treatment & Education:  Pt sat EOB with left trunk  lean/push.  Lateral trunk bend to the right onto R forearm with manual left lateral trunk stretch and push to midline with CGA.  Pt was able to sit midline for ~10 seconds before leaning to left.  Pt positioned again in left lateral bend position and was assisted with rocking, fwd/back, to assist with decreasing left push.  Pt was able to maintain midline again for ~10 sec.  Pt was able to right himself to midline with vc's and PTA in front of pt to provide a visual cue, but has difficulty maintaining midline and requires repeated cues and at times Jason to regain.      Static standing first two bouts with RW and Max A of 1 on left and CGA/Jason of another on right.  PTA on left providing Max A secondary to increased left push/lean and unable to right to midline. RUE in extension pushing on RW to the left and RLE extended out to the right with left knee flexed.  PTA also providing assist at left knee for extension and to assist with midline posture.   3 and 4th bouts without A.D. 3rd bout with pt's RUE on OT's shoulder to decrease left push with OT providing Mod A and PTA on left providing Max A to decrease left lean/push and blocking left knee into extension for improved standing and midline posture.  4th trial with pt's RUE on PTA's shoulder to decreased left push, RLE positioned under right hip and blocked to prevent RLE pushing to the left, and OT on left providing assistance for midline standing, Max A of 2. Pt required a seated rest break between bouts secondary to decreased strength/endurance.      Pt scooted laterally to the left up to HOB with Mod/Max A and vcs and returned supine with Jason for LLE onto EOB.  Pt had a barium swallow test earlier today and had recently finished working with OT prior to PTA seeing pt.  PT was fatigued at end of tx.    Patient left supine with all lines intact, call button in reach, bed alarm on, and RN notified..    GOALS:   Multidisciplinary Problems       Physical Therapy  Goals          Problem: Physical Therapy    Goal Priority Disciplines Outcome Goal Variances Interventions   Physical Therapy Goal     PT, PT/OT Ongoing, Progressing     Description: Goals to be met by: 2023     Patient will increase functional independence with mobility by performin. Supine to sit with MInimal Assistance  2. Sit to supine with MInimal Assistance  3. Rolling to Left and Right with Minimal Assistance.  4. Sit to stand transfer with Minimal Assistance  5. Bed to chair transfer with Minimal Assistance using Rolling Walker  6. Gait  x 10 feet with Maximum Assistance using LRAD.  7. Stand for 3 minutes with Moderate Assistance using LRAD to perform a functional activity.                           Time Tracking:     PT Received On: 23  PT Start Time: 1449     PT Stop Time: 1521  PT Total Time (min): 32 min     Billable Minutes: Therapeutic Activity 17 and Neuromuscular Re-education 15    Treatment Type: Treatment  PT/PTA: PTA     Number of PTA visits since last PT visit: 2023
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
## 2023-06-06 NOTE — NURSING
Pt noted with 10 Beat run V-tach 135. Pt assessed. Wide awake, denies c/o CP, SOB pain or any distress. HOB up 30-45. /77 HR back to SR 70s, 78 at present. Spo2 95% RR 18 temp 96.9. Dr Blane pedroza, returned called at [phone]. No orders given, stated to just monitor. Event strip placed in chart.

## 2023-06-06 NOTE — PT/OT/SLP PROGRESS
Occupational Therapy   Treatment    Name: Dejuan Correa  MRN: [id number]  Admitting Diagnosis:  Cerebellar stroke       Recommendations:     Discharge Recommendations: other (see comments) (High Intensity Occupational Therapy)  Discharge Equipment Recommendations:   (Defer to post acute care)  Barriers to discharge:  Other (Comment) (unable to ambulate; high fall risk; assist for all ADLs)    Assessment:     Dejuan Correa is a 78 y.o. male with a medical diagnosis of Cerebellar stroke.  He presents with ..The primary encounter diagnosis was Cerebellar stroke. Diagnoses of Stroke and Acute CVA (cerebrovascular accident) were also pertinent to this visit. . Performance deficits affecting function are weakness, impaired endurance, impaired self care skills, impaired functional mobility, decreased coordination, visual deficits, impaired balance, gait instability, decreased upper extremity function, decreased lower extremity function, decreased safety awareness, abnormal tone, pain, edema, decreased ROM.     Continue OT POC. Minimal progression in functional mobility. Impaired left sided weakness including orally as noted by lack ability to appropriately management food and secretions on left side. Difficulty with initiating feeding as evidenced by food in room untouched, but receptive to guided self-feeding techniques. Slight increase in tone in LUE compared to prior session.  Recommend High Intensity Occupational Therapy upon medically stable.     Rehab Prognosis:  Good; patient would benefit from acute skilled OT services to address these deficits and reach maximum level of function.       Plan:     Patient to be seen 5 x/week to address the above listed problems via self-care/home management, therapeutic activities, therapeutic exercises, neuromuscular re-education  Plan of Care Expires: 07/03/23  Plan of Care Reviewed with: patient    Subjective     Chief Complaint: pain in Left ankle  Patient/Family  "Comments/goals: Wants to improve and get better  Pain/Comfort:  Pain Rating 1: other (see comments) (c/o pain in L ankle)  Pain Addressed 1: Nurse notified  Pain Rating Post-Intervention 1: 0/10    Objective:     Communicated with: nursing prior to session.  Patient found HOB elevated with Condom Catheter, telemetry, bed alarm upon OT entry to room.    General Precautions: Standard, fall, aspiration    Orthopedic Precautions:N/A  Braces: N/A  Respiratory Status: Room air     Occupational Performance:     Bed Mobility:    Patient completed Rolling/Turning to Left with  minimum assistance and with side rail  Patient completed Rolling/Turning to Right with minimum assistance and with side rail  Patient completed Scooting/Bridging with minimum assistance, x1 persons, and with side rail  Patient completed Supine to Sit with moderate assistance and x1 persons  Patient completed Sit to Supine with moderate assistance and x1 persons     Functional Mobility/Transfers:  Functional Mobility: Patient completed 5 lateral side scoots along EOB with moderate assist x1 person and verbal/visual  cues for forward trunk flexion and midline righting. Requires prompting / physical assist to manage LLE each scoot.    Activities of Daily Living:  Feeding:  Maximal assist with max verbal/visual cues progressing to contact guard assist with continuous verbal cues ("two bites, spoon down, big swallow") ; education/ training provided to CNA for carryover of OT strategies to increase safety and independence with eating . Able to scan and find all objects on tray with mild prompting.      [facility] 6 Click ADL: 13    Treatment & Education:  Patient reeducated on role of OT. With extra time, patient oriented x4 and reports "I know now" when neuro MD entering session recommending patient to quit alcohol. Patient scans to the Left at therapist's voice. Correctly identifies and scans to find 4/ 4 objects in room. Reports hunger but no intake of meal " tray observed (RN reporting patient required total feeding assist in AM). Guided to EOB with increased time as above noted. Neuro re-education techniques instructed for midline righting 2/2 left sided trunk lean (performed 5 trials of reaching to the right with RUE while supporting self weight bearing through LUE). Approximation provided to left shoulder. Lateral side scoots completed as above. Returned to bed due to increased fatigue and leaning. Bed placed in upright sitting position. Neuro-reeducation preparatory techniques applied to LUE with incorporation of non affected RUE for bilateral integration training to foster neuroplasticity principles in prep for self feeding training. Wedge applied under L elbow for support. While moving RUE through motion, patient also guided for 1 x10 left shoulder flexion (therapist providing tapping cues to anterior deltoid) than 2 x 5-10 of left elbow flexion return extension (therapist applying quick strokes/tapping to illicit biceps/triceps). Self feeding completed as above with CNA training. Session ended with patient in upright seated. PT entered room to takeover.    Patient left with bed in chair position with all lines intact, call button in reach, bed alarm on, and nursing notified; PT present    GOALS:   Multidisciplinary Problems       Occupational Therapy Goals          Problem: Occupational Therapy    Goal Priority Disciplines Outcome Interventions   Occupational Therapy Goal     OT, PT/OT Ongoing, Progressing    Description: Goals to be met by: 7/3/2023     Patient will increase functional independence with ADLs by performing:    LE Dressing with Contact Guard Assistance with increased time.  Toileting from toilet with Modified independence and increased time for hygiene and clothing management.   Step transfer with Stand-by Assistance and AD as needed.  Toilet transfer to toilet with Stand-by Assistance and AD as needed.  Increase LUE proximal strength grossly to  3+/5.  100% reciprocation for patient education, CVA/BEFAST education, ADL/Mobility modifications, and visual scanning as appropriate.                          Time Tracking:     OT Date of Treatment: 06/06/23  OT Start Time: 1413  OT Stop Time: 1454  OT Total Time (min): 41 min    Billable Minutes:  Self Care/Home Management 15 min  Therapeutic Activity 11 min  Neuromuscular Re-education 15 min  OT/ARNALDO: OT          6/6/2023

## 2023-06-06 NOTE — PLAN OF CARE
Problem: Physical Therapy  Goal: Physical Therapy Goal  Description: Goals to be met by: 2023     Patient will increase functional independence with mobility by performin. Supine to sit with MInimal Assistance  2. Sit to supine with MInimal Assistance  3. Rolling to Left and Right with Minimal Assistance.  4. Sit to stand transfer with Minimal Assistance  5. Bed to chair transfer with Minimal Assistance using Rolling Walker  6. Gait  x 10 feet with Maximum Assistance using LRAD.  7. Stand for 3 minutes with Moderate Assistance using LRAD to perform a functional activity.      2023 1544 by Batool Shah PTA  Outcome: Ongoing, Progressing  2023 1544 by Batool Shah, GREGORIA  Outcome: Ongoing, Progressing

## 2023-06-06 NOTE — NURSING
6 beat run tach 175 noted on tele monitor. Pt assessed, resting appears asleep. Very easy to arouse, denies any complaints, no CP no SOB or palpitations no distress voiced. /72 back to SR 70s, 72 at present RR 18 T 97.8 Spo2 95%. Dr Blane pedroza, notified of all previously written. No orders given. Will CTCM. See strip placed in chart.

## 2023-06-06 NOTE — NURSING
Pt anticipated to go to [facility] on d/c. PCC appt canceled at this time.      Amy Rossi RN-[facility] [place]  [phone]

## 2023-06-06 NOTE — PROCEDURES
Modified Barium Swallow  Education       Patient Name:  Dejuan Crorea   MRN:  [id number]      Recommendations:     Recommendations:                General Recommendations:  dysphagia and dysarthria remediation  Diet recommendations:  Puree, Puree Diet - IDDSI Level 4,   Nectar Thick, Mildly thick/Nectar thick liquids - IDDSI Level 2   Swallow Precautions:  1. UPRIGHT for all meals at 90 degree and for all PO solid intake   2. Pureed Tray/ NECTAR thick Liquids  3. Cup swallows preferred to regular volume   4. Alternate sips/bites  5. Eat  slowly  6. Assist for feeding set-up   7. One by one meds with sips of liquids   8. Close monitoring needed for any overt s/s of aspiration (coughing/choking, throat clearing, wet/gurgly vocal quality, nasal emission, watery eyes, spike in fever, reddened face, reduced O2 sats, etc)              No Jello, ice cream, popsicles, or frozen drinks!  In order to achieve NECTAR consistency, mix 1 packet of thickener with 4oz of liquids.  ALL liquids must be thickened, including broth and soups.  General Precautions: Standard, aspiration, fall, vision impaired, respiratory  Communication strategies:  dysarthria     Referral     Reason for Referral  Patient was referred for a Modified Barium Swallow Study to assess the efficiency of his/her swallow function, rule out aspiration and make recommendations regarding safe dietary consistencies, effective compensatory strategies, and safe eating environment.     Diagnosis: Cerebellar stroke       History per MD    Dejuan Correa is a 78 y.o.  male who has a past medical history of paroxysmal Atrial fibrillation, Supraventricular tachycardia, HTN, HLD, and alcohol use disorder with hx of complicated withdrawal. The patient presented to [facility] on 6/4/2023 with a primary complaint of slurred speech and left sided weakness that was first noticed by his son at around 2 pm the day of admission. He had last been seen normal 1  hour prior to symptom onset.      Patient was hospitalized (5/26-5/29) for seizure like activity 2/2 severe alcohol use disorder with complicated withdrawal. He was also treated for PNA presumed aspiration wth rocephin and azithromycin and was discharged home with home health.      In ED: awake, alert, and oriented x4 / speech slurred but intelligible / slight L sided drift but no L sided weakness / Not a tpa candidate, on xarelto / HTNsive to 187/94, HR 80, EKG NS.      Patient states he had not noticed any weakness or slurred speech until his son told him his speech was abnormal, prompting him to present to the ED for evaluation.   He denies alcohol use following recent hospitalization (reports he has quit for good). Last drink ~ 7 days ago. No evidence of withdrawal upon my exam. He reports adherence to all prescribed medications, including xarelto and rate control for P-afib.      He denies palpitations, chest pain, shortness of breath, fever/chills/ nausea/vomiting, abdominal pain, dizziness, lightheadedness.           Past Medical History:   Diagnosis Date    Atrial fibrillation     Hypertension     Irregular heart beat     Supraventricular tachycardia        Objective:     Current Respiratory Status: 06/06/23    Alert: yes    Cooperative: yes    Follows Directions: yes    Visualization  Patient was seen in the lateral view  Supplement O2 in place via nasal cannula 1 liter    Oral Peripheral Examination  Oral Musculature: general weakness, facial asymmetry present  Dentition: edentulous (reports he has dentures at home)  Secretion Management: oral holding, left corner drooling  Mucosal Quality: adequate  Oral Labial Strength and Mobility: impaired retraction  Lingual Strength and Mobility: impaired strength  Buccal Strength and Mobility: decreased tone  Volitional Cough: elicited  Volitional Swallow: delayed oral and pharyngeal swallow  Voice Prior to PO Intake: raspy voice , wet at times    Consistencies  Assessed  Tsp of thin barium x1, open cup of thin barium x4, attempted straw swallows of thin barium x2 attempts   (cannot siphon from straw)  Nectar thick barium by open cup x4  Pudding with mixed barium paste x3 trials   Diced peaches mixed with coated barium powder  MBSS completed in lateral view    Oral Preparation/Oral Phase  prolonged A-P transfer  prolonged mastication  Decreased base of tongue mobility  Premature spillage  Oral holding and delayed oral initiation of swallow was present  Base of tongue residuals noted with thin, nectar and semi- solid textures.     Pharyngeal Phase   Pharyngeal delay across consistencies was noted with all textures with premature loss of bolus into vallecular space, pt with reduced tongue base retraction, reduced hyolaryngeal lift, reduced sensation of material in vallecula and mild-moderate degree of residuals in vallecula with thin and semi- solid textures. Pt with coughing noted post swallow trigger on thin and semi-solid textures. SLP suspects aspiration of thin liquids post swallow trigger (pt's shoulder obstructed view and pt also noted to move from view of video)  Pt with penetration of nectar thick liquids x1 instance post swallow due to residuals from vallecular space. Pt with defensive cough following penetration episode. NO further episodes of penetration noted.   Pt safe for initiation of nectar and pureed texture.     Cervical Esophageal Phase  Decreased UES opening    Assessment:     Impressions   Moderate oral and pharyngeal dysphagia as stated above with questionable episode of airway aspiration post swallow due to oral and pharyngeal delay on all textures when presented. Pt with airway penetration of nectar thick liquids by open cup x1 instance and needs additional verbal cues to perform dry swallows to clear pharynx.     Prognosis: Fair    Barriers:  Fatigue  Respiratory compromise    Plan  Pt to be followed on acute to address dysphagia remediation,  education with thickener and diet advancement as able.  SLP will also target oral delay and pharyngeal weakness present due to new onset of CVA.     Education  Results were discussed with Medical Team who was in agreement with plan.   Secure chat sent to team with diet recs and RN made aware of need for thickener.     Goals:   Multidisciplinary Problems       SLP Goals          Problem: SLP    Goal Priority Disciplines Outcome   SLP Goal     SLP Ongoing, Progressing   Description: Short Term Goals:  1. Pt will participate in ongoing swallow assessment to determine least restrictive diet.   2. Pt will tolerate FULL liquid diet with no audible s/s of dysphagia and good oral clearance.   3. Pt will consume 75% of FULL Liquid without overt s/s of aspiration given min assist.   4. Pt will implement safe swallowing strategies 100% of the time given min assist.   5. Patient will successfully participate in isuucm-gdjwosin-eemitroeg evaluation to further assess for any communication impairments s/p stroke  6. Pt will tolerate advanced trials of PO with no outward s/s of dysphagia.   7. Pt will state personal info with min cues.  8. Pt will perform OMEx with mod cues to increase oral motor coordination for speech and swallowing.   9. Pt will complete written and reading tasks to determine further goals.                               Plan:   Patient to be seen:  Therapy Frequency: 3 x/week   Plan of Care expires:  07/04/23  Plan of Care reviewed with:  patient        Discharge recommendations:   (high intensity level of therapy)   Barriers to Discharge:  none    Time Tracking:   SLP Treatment Date:   06/06/23  Speech Start Time:  1220  Speech Stop Time:  1250    Self care time:  12:40-12:50:  10 minutes   Speech Total Time (min):  30 min      06/06/2023

## 2023-06-06 NOTE — PLAN OF CARE
Problem: Physical Therapy  Goal: Physical Therapy Goal  Description: Goals to be met by: 2023     Patient will increase functional independence with mobility by performin. Supine to sit with MInimal Assistance  2. Sit to supine with MInimal Assistance  3. Rolling to Left and Right with Minimal Assistance.  4. Sit to stand transfer with Minimal Assistance  5. Bed to chair transfer with Minimal Assistance using Rolling Walker  6. Gait  x 10 feet with Maximum Assistance using LRAD.  7. Stand for 3 minutes with Moderate Assistance using LRAD to perform a functional activity.      Outcome: Ongoing, Progressing   Continue working toward goals.

## 2023-06-06 NOTE — PROGRESS NOTES
"    [facility] Medicine Progress Note    Primary Team: [facility] Hospitalist Team B  Attending Physician: Reji  Resident: Lisa/Billy  Intern: Jermaine/Ajay    [facility] Day: 2    Subjective:      Patient is awake this morning, laying in bed undistressed. Alert and oriented, has some slur in his speech and facial droop, but able to understand and reply to questions. Asked for me to call his brother and ask him to bring him his phone and dentures.     No complaints at this time. Denies f/c/n/v, sob, headache, cp.       Objective:   Last 24 Hour Vital Signs:  BP  Min: 143/67  Max: 193/99  Temp  Av.9 °F (36.1 °C)  Min: 96 °F (35.6 °C)  Max: 97.3 °F (36.3 °C)  Pulse  Av  Min: 70  Max: 92  Resp  Av.4  Min: 16  Max: 20  SpO2  Av.1 %  Min: 94 %  Max: 99 %  Height  Av' 11" (180.3 cm)  Min: 5' 11" (180.3 cm)  Max: 5' 11" (180.3 cm)  Weight  Av.8 kg (219 lb 15.3 oz)  Min: 99.3 kg (218 lb 14.7 oz)  Max: 100.2 kg (221 lb)    Intake/Output Summary (Last 24 hours) at 2023 0731  Last data filed at 2023 0551  Gross per 24 hour   Intake 60 ml   Output 500 ml   Net -440 ml        Physical Examination:  Physical Exam  Vitals and nursing note reviewed.   Constitutional:       Appearance: Normal appearance.   HENT:      Head: Normocephalic and atraumatic.      Right Ear: External ear normal.      Left Ear: External ear normal.      Nose: Nose normal. No congestion or rhinorrhea.      Mouth/Throat:      Mouth: Mucous membranes are moist.      Pharynx: Oropharynx is clear.   Eyes:      Pupils: Pupils are equal, round, and reactive to light.   Cardiovascular:      Rate and Rhythm: Normal rate and regular rhythm.   Pulmonary:      Effort: Pulmonary effort is normal.      Breath sounds: Normal breath sounds. No wheezing or rales.   Abdominal:      General: Abdomen is flat. Bowel sounds are normal.      Palpations: Abdomen is soft.   Musculoskeletal:      Right lower leg: No edema.      Left lower leg: " No edema.   Skin:     Capillary Refill: Capillary refill takes less than 2 seconds.      Findings: No erythema or rash.   Neurological:      Mental Status: He is alert.      Comments: See detailed neuro exam below   Psychiatric:         Mood and Affect: Mood normal.         Behavior: Behavior normal.        NEUROLOGICAL    MENTAL STATUS: AAOx3, memory intact, fund of knowledge appropriate  LANGUAGE/SPEECH: Naming and repetition intact, fluent, follows 3-step commands    Laboratory:  Recent Labs   Lab 06/04/23  1635 06/05/23  0513 06/05/23  0514 06/06/23  0439   WBC 10.06  --  8.95 9.58   HGB 13.5*  --  13.4* 13.0*   HCT 41.6  --  41.1 40.6     --  267 245   *  --  102* 102*   RDW 13.5  --  13.4 13.4    139  --  139   K 3.9 3.7  --  4.1    103  --  102   CO2 26 21*  --  23   BUN 14 13  --  13   CREATININE 1.2 1.1  --  1.1   * 108  --  88   PROT 6.7 7.3  --  6.9   ALBUMIN 3.4* 3.3*  --  3.2*   BILITOT 1.2* 1.3*  --  1.6*   AST 16 16  --  17   ALKPHOS 103 102  --  95   ALT 6* 6*  --  7*       I have personally reviewed the above laboratory studies.     Imaging:  EKG (my interpretation): No new today    CTA Head and Neck (xpd)   Final Result      1. Unremarkable CTA head and neck.  No large vessel occlusion or high-grade stenosis.   2. Atherosclerotic disease of the bilateral carotid bifurcations, and the bilateral intracranial ICAs and vertebral arteries as above.   3. Known acute infarctions better evaluated on recently performed MRI.   4. Senescent changes and chronic microvascular ischemic changes.   5. Pulmonary artery dilation which can be seen with pulmonary hypertension.         Electronically signed by: Gurdeep Cho   Date:    06/05/2023   Time:    18:39      MRI Brain Without Contrast   Final Result   Abnormal      1. Multiple small acute infarctions in the left cerebellar hemisphere and in the right corona radiata.   2. Senescent changes and chronic microvascular ischemic  changes.   This report was flagged in Epic as abnormal.      Dr. Gurdeep Cho discussed critical findings with Dr. Nyasia Cho by telephone at 19:04 on 06/04/2023.         Electronically signed by: Gurdeep Cho   Date:    06/04/2023   Time:    19:07      CT Head Without Contrast   Final Result      Small hypodensity in the left cerebellar hemisphere compatible with an age-indeterminate lacunar infarction.  Otherwise no acute intracranial abnormality.  Further evaluation with MRI as clinically indicated.         Electronically signed by: Gurdeep Cho   Date:    06/04/2023   Time:    17:06          Current Medications:  Scheduled:   aspirin  81 mg Oral Daily    atorvastatin  40 mg Oral Daily     Infusions:    PRN:  acetaminophen, labetalol, pneumoc 20-richmond conj-dip cr(PF), sodium chloride 0.9%    Assessment:     Dejuan Correa is a 78 y.o.  male who has a past medical history of paroxysmal Atrial fibrillation, Supraventricular tachycardia, HTN, HLD, and alcohol use disorder with hx of complicated withdrawal. The patient presented to [facility] on 6/4/2023 with a primary complaint of slurred speech and left sided weakness the afternoon of admission, found to have multiple small acute L cerebellar and R corona radiata infarcts on MRI brain. Neurology consulted, awaiting recs. Therapies recomending IPR for patient.    Plan:     Acute cerebellar CVA  - slurred speech + mild L sided weakness, L pronator drift in patient on Xarelto for pAfib  - NIH scale:  - not TPA candidate, on xarelto for P-afib  - CT head: age indeterminate hypodensity L cerebellum  - MRI brain: multiple small acute infarctions in the left cerebellar hemisphere and in the right corona radiata.  - multifocal CVA suggests embolic etiology- has rate controlled P-afib adherent to Xarelto  - TIA ABCD2 score = 6  Plan:  - Neurology consulted, awaiting recs  - CTA ordered   - A1c 5.2, TSH wnl, B12 263, Folate 3.2  - Lipid panel with HDL  31, LDL 68  - Frequent neuro-checks (q4h)  - Head of bed > 30 degrees for aspiration prevention and aspiration precautions ordered.  -Atorvastatin 40 mg daily (long-term goal LDL < 70)  - Xarento held in setting of acute stroke, need recommendation on timing for restarting from neurology  -Stroke education and counseling  -Physical therapy, occupational therapy, speech therapy   - Recommend IPR   - Full Liquid diet    Paroxysmal A-fib  - reported adherent to coreg and xarelto, multifocal CVA raises concern for thromboembolic source  - NS with normal rate since admission   Plan:  - monitor on telemetry  - hold home coreg and xarelto in setting of acute CVA, labetalol PRN for HR >120     HTN   - home meds: coreg 12.5 daily, HCTZ 12.5, valsartan 40 vs 320 mg  Plan:  - permissive HTN in setting of acute CVA, labetalol 5 IV prn for SBP>220, DBP> 120 for 24hrs     HLD  - not on statin  - Lipid panel with HDL 31, LDL 68  Plan:  -Atorvastatin 40 mg daily (long-term goal LDL < 70)     Severe alcohol use disorder in early remission with hx complicated withdrawal  - reports no alcohol use since hospital discharge (last drink >7 days)  - ethanol <10 on admission  - no clinical evidence of acute withdrawal at this time  Plan:  - monitor on tele, CIWA Q6     Macrocytic anemia  - mild anemia, MCV>100, likely 2/2 alcohol use   Plan:  - obtain b12 and folic acid      Diet: Diet full liquid   DVT Prophylaxis: SCDs until neuro eval for LMWH  Code: Full Code     Social: daughter vineet updated over phone, brother updated at bedside  Dispo: Pending neuro recs for ongoing anticoagulation and IPR acceptance    Davida Cervantes MD  [employer] IM/Peds PGY3/HO2    [facility] Medicine Hospitalist Pager numbers:   [facility] Hospitalist Medicine Team A (Anila/Priscilla): [phone]  [facility] Hospitalist Medicine Team B (Reji/Samina):  [phone]

## 2023-06-06 NOTE — PLAN OF CARE
Problem: SLP  Goal: SLP Goal  Description: Short Term Goals:  1. Pt will participate in ongoing swallow assessment to determine least restrictive diet.   2. Pt will tolerate FULL liquid diet with no audible s/s of dysphagia and good oral clearance.   3. Pt will consume 75% of FULL Liquid without overt s/s of aspiration given min assist.   4. Pt will implement safe swallowing strategies 100% of the time given min assist.   5. Patient will successfully participate in xhtlqv-ryolvhsa-dbviixcnq evaluation to further assess for any communication impairments s/p stroke  6. Pt will tolerate advanced trials of PO with no outward s/s of dysphagia.   7. Pt will state personal info with min cues.  8. Pt will perform OMEx with mod cues to increase oral motor coordination for speech and swallowing.   9. Pt will complete written and reading tasks to determine further goals.          Outcome: Ongoing, Progressing   MBS completed this date, recommend pureed diet and NECTAR Thick liquids for now with assistance for feeding, add thickener in liquids. Pt aware of strict swallow precautions.

## 2023-06-07 LAB
POCT GLUCOSE: 114 MG/DL (ref 70–110)
POCT GLUCOSE: 117 MG/DL (ref 70–110)
POCT GLUCOSE: 132 MG/DL (ref 70–110)
POCT GLUCOSE: 149 MG/DL (ref 70–110)

## 2023-06-07 PROCEDURE — 27000221 HC OXYGEN, UP TO 24 HOURS

## 2023-06-07 PROCEDURE — 63600175 PHARM REV CODE 636 W HCPCS

## 2023-06-07 PROCEDURE — 25000003 PHARM REV CODE 250: Performed by: STUDENT IN AN ORGANIZED HEALTH CARE EDUCATION/TRAINING PROGRAM

## 2023-06-07 PROCEDURE — 97112 NEUROMUSCULAR REEDUCATION: CPT

## 2023-06-07 PROCEDURE — 94761 N-INVAS EAR/PLS OXIMETRY MLT: CPT

## 2023-06-07 PROCEDURE — 11000001 HC ACUTE MED/SURG PRIVATE ROOM

## 2023-06-07 PROCEDURE — 97535 SELF CARE MNGMENT TRAINING: CPT

## 2023-06-07 PROCEDURE — 99900035 HC TECH TIME PER 15 MIN (STAT)

## 2023-06-07 PROCEDURE — 25000003 PHARM REV CODE 250

## 2023-06-07 RX ORDER — HYDROCHLOROTHIAZIDE 12.5 MG/1
12.5 TABLET ORAL DAILY
Status: DISCONTINUED | OUTPATIENT
Start: 2023-06-07 | End: 2023-06-12 | Stop reason: HOSPADM

## 2023-06-07 RX ORDER — FOLIC ACID 1 MG/1
1 TABLET ORAL DAILY
Status: DISCONTINUED | OUTPATIENT
Start: 2023-06-07 | End: 2023-06-12 | Stop reason: HOSPADM

## 2023-06-07 RX ORDER — CYANOCOBALAMIN 1000 UG/ML
1000 INJECTION, SOLUTION INTRAMUSCULAR; SUBCUTANEOUS WEEKLY
Start: 2023-06-14 | End: 2023-08-02

## 2023-06-07 RX ORDER — ATORVASTATIN CALCIUM 40 MG/1
40 TABLET, FILM COATED ORAL DAILY
Qty: 90 TABLET | Refills: 3
Start: 2023-06-08 | End: 2023-08-23 | Stop reason: SDUPTHER

## 2023-06-07 RX ORDER — FOLIC ACID 1 MG/1
1 TABLET ORAL DAILY
Qty: 30 TABLET | Refills: 11
Start: 2023-06-08 | End: 2023-08-23 | Stop reason: SDUPTHER

## 2023-06-07 RX ORDER — ASPIRIN 81 MG/1
81 TABLET ORAL DAILY
Qty: 90 TABLET | Refills: 3
Start: 2023-06-08 | End: 2023-08-23 | Stop reason: SDUPTHER

## 2023-06-07 RX ORDER — CYANOCOBALAMIN 1000 UG/ML
1000 INJECTION, SOLUTION INTRAMUSCULAR; SUBCUTANEOUS WEEKLY
Status: DISCONTINUED | OUTPATIENT
Start: 2023-06-07 | End: 2023-06-12 | Stop reason: HOSPADM

## 2023-06-07 RX ADMIN — HYDROCHLOROTHIAZIDE 12.5 MG: 12.5 TABLET ORAL at 09:06

## 2023-06-07 RX ADMIN — ASPIRIN 81 MG: 81 TABLET, COATED ORAL at 09:06

## 2023-06-07 RX ADMIN — ATORVASTATIN CALCIUM 40 MG: 40 TABLET, FILM COATED ORAL at 09:06

## 2023-06-07 RX ADMIN — VALSARTAN 40 MG: 40 TABLET, FILM COATED ORAL at 09:06

## 2023-06-07 RX ADMIN — CYANOCOBALAMIN 1000 MCG: 1000 INJECTION, SOLUTION INTRAMUSCULAR; SUBCUTANEOUS at 09:06

## 2023-06-07 RX ADMIN — CARVEDILOL 12.5 MG: 12.5 TABLET, FILM COATED ORAL at 09:06

## 2023-06-07 RX ADMIN — RIVAROXABAN 20 MG: 20 TABLET, FILM COATED ORAL at 05:06

## 2023-06-07 RX ADMIN — FOLIC ACID 1 MG: 1 TABLET ORAL at 09:06

## 2023-06-07 NOTE — PLAN OF CARE
06/07/23 1438   Post-Acute Status   Post-Acute Authorization Placement   Post-Acute Placement Status Pending payor review/awaiting authorization (if required)  ([place] contacted [facility] ins and spoke Sravani and they do have auth request but a decision has not been made yet.  TN expressed DC for today and requested expedited auth review.  [phone])            [facility] [phone]  Auth# [id number]    [facility] MEDICARE ADVANTAGE [id number]   Subscriber Subscriber #   Dejuan Correa [id number]

## 2023-06-07 NOTE — PT/OT/SLP PROGRESS
Occupational Therapy   Treatment    Name: Dejuan Correa  MRN: [id number]  Admitting Diagnosis:  Cerebellar stroke       Recommendations:     Discharge Recommendations: other (see comments) (High intensity occupational therapy)  Discharge Equipment Recommendations:  other (see comments) (defer to post acute care)  Barriers to discharge:  Other (Comment) (unable to ambulate; high fall risk; assist for all ADLs)    Assessment:     Dejuan Correa is a 78 y.o. male with a medical diagnosis of Cerebellar stroke.  He presents with ..The primary encounter diagnosis was Cerebellar stroke. Diagnoses of Stroke and Acute CVA (cerebrovascular accident) were also pertinent to this visit. . Performance deficits affecting function are weakness, impaired endurance, impaired self care skills, impaired functional mobility, decreased coordination, impaired balance, gait instability, visual deficits, decreased upper extremity function, decreased lower extremity function, decreased safety awareness, abnormal tone, impaired fine motor, decreased ROM, impaired coordination.     Continue OT POC. Moderate progression in visual scanning noted. Increased tone LUE and noted mild tremor in Left hand. Difficulty with bed mobility as evidenced by need of moderate assist x2 persons to maximum assist x1 person compared to prior session (moderate assist x1 person). Responsive to neuro re-education techniques. Recommending high intensity occupational therapy.    Rehab Prognosis:  Good; patient would benefit from acute skilled OT services to address these deficits and reach maximum level of function.       Plan:     Patient to be seen 5 x/week to address the above listed problems via self-care/home management, therapeutic activities, therapeutic exercises, neuromuscular re-education  Plan of Care Expires: 07/03/23  Plan of Care Reviewed with: patient    Subjective     Chief Complaint: Patient endorses feeling fine at beginning of  session  Patient/Family Comments/goals: Patient wants to move so he can improve  Pain/Comfort:  Pain Rating 1: 0/10  Pain Addressed 1: Nurse notified  Pain Rating Post-Intervention 1: 0/10    Objective:     Communicated with: Nursing prior to session.  Patient found HOB elevated with telemetry, bed alarm, Condom Catheter upon OT entry to room.    General Precautions: Standard, fall, aspiration    Orthopedic Precautions:N/A  Braces: N/A  Respiratory Status: Nasal cannula, flow 2 L/min     Occupational Performance:     Bed Mobility:    Patient completed Rolling/Turning to Right with moderate assistance and with side rail  Patient completed Supine to Sit with moderate assistance and 2 persons  Patient completed Sit to Supine with maximal assistance and 1 persons     Functional Mobility/Transfers:  Functional Mobility: Patient performed 5 side scoots to the Right sitting EOB with Maximum assist x1 person for first scoot to requiring Moderate assist x2 persons for the remaining scoots.    Activities of Daily Living:  Grooming: contact guard assistance and verbal/tactile cueing to right trunk to center while sitting EOB with set up assist for oral hygiene      [facility] 6 Click ADL: 13    Treatment & Education:  Patient oriented to self, time, and situation. Provided joint approximation of Left UE/LE prior to bed mobility. Guided to patient to EOB as above. Oral hygiene activity performed sitting EOB with contact guard assist and verbal/tactile cueing. Patient guided through Lateral leans x5 on each side to improve neuroplasticity and ability to right self to midline. Left hand placement assist provided on 5/5 trials. Camera on phone utilized for visual image feedback for postural correction.  Engaged in 5 side scoots to the Right with Maximum assist x1 person to Moderate assist x2 persons. Patient returned to bed with Maximum assist x1 person.       Patient left HOB elevated with all lines intact, call button in reach, bed  alarm on, nursing notified, and noticed soiled at end of session; PCT notified with understanding demonstrated.     GOALS:   Multidisciplinary Problems       Occupational Therapy Goals          Problem: Occupational Therapy    Goal Priority Disciplines Outcome Interventions   Occupational Therapy Goal     OT, PT/OT Ongoing, Progressing    Description: Goals to be met by: 7/3/2023     Patient will increase functional independence with ADLs by performing:    Self feeding with distant supervision in upright seated position with adaptations as needed (goal added 6/6/2023).  LE Dressing with Contact Guard Assistance with increased time.  Toileting from toilet with Modified independence and increased time for hygiene and clothing management.   Step transfer with Stand-by Assistance and AD as needed.  Toilet transfer to toilet with Stand-by Assistance and AD as needed.  Increase LUE proximal strength grossly to 3+/5.  100% reciprocation for patient education, CVA/BEFAST education, ADL/Mobility modifications, and visual scanning as appropriate.                          Time Tracking:     OT Date of Treatment: 06/07/23  OT Start Time: 1522  OT Stop Time: 1555  OT Total Time (min): 33 min    Billable Minutes:Self Care/Home Management 15 min  Neuromuscular Re-education 18 min    OT/ARNALDO: OT          6/7/2023

## 2023-06-07 NOTE — PROGRESS NOTES
Per notes - [facility] is reviewing pt for admit - CM sent a message to Amira Kennedy advising her that CM is following pt.   Daughter Alejandra Wood549 [phone];  Brother    ------------------------------  CM called and spoke with Shruti  --Shruti at [facility] [phone] -- case has gone to Medical Director for review. Case # [id number].   Request for IPR auth has gone to their Medcal Director.     -----------------------------     CM called and spoke with pt's daughter Alejandra Ledbetter999 [phone]   -- advised her that [facility] IPR is still awaiting auth from pt's insurance.    ELLEN discussed with her the possiblity of alternate plcmt    CM will update daughter in am

## 2023-06-07 NOTE — PLAN OF CARE
[facility]    FACILITY TRANSFER ORDERS      Patient Name: Dejuan Correa  YOB: 1945    PCP: Joe De Jesus Jr, MD   PCP Address: [address] / [place] [address]  PCP Phone Number: [phone]  PCP Fax: [phone]    Encounter Date: 06/07/2023    Admit to:  [facility]    Vital Signs:  Routine    Diagnoses:   Active Hospital Problems    Diagnosis  POA    *Cerebellar stroke [I63.9]  Unknown    Alcohol withdrawal seizure with delirium [F10.931, R56.9]  Yes    Alcohol use disorder, severe, dependence [F10.20]  Yes    PAF (paroxysmal atrial fibrillation) [I48.0]  Yes    PSVT (paroxysmal supraventricular tachycardia) [I47.1]  Yes    Primary hypertension [I10]  Yes    HLD (hyperlipidemia) [E78.5]  Yes      Resolved Hospital Problems   No resolved problems to display.       Allergies:  Review of patient's allergies indicates:   Allergen Reactions    Heparin analogues        Diet: pureed diet nectar thick and fluid consistency nectar thick    Activities: Activity as tolerated    Goals of Care Treatment Preferences:  Code Status: Full Code      Nursing: No special needs, standard nursing care     Labs: CBC  in 4-weeks for anemia      CONSULTS:    Physical Therapy to evaluate and treat. , Occupational Therapy to evaluate and treat., Speech Therapy to evaluate and treat for Language, Swallowing, and Cognition., and  to evaluate for community resources/long-range planning.    MISCELLANEOUS CARE:  none    WOUND CARE ORDERS  none    Medications: Review discharge medications with patient and family and provide education.      Current Discharge Medication List        START taking these medications    Details   aspirin (ECOTRIN) 81 MG EC tablet Take 1 tablet (81 mg total) by mouth once daily.  Qty: 90 tablet, Refills: 3      atorvastatin (LIPITOR) 40 MG tablet Take 1 tablet (40 mg total) by mouth once daily.  Qty: 90 tablet, Refills: 3      cyanocobalamin 1,000 mcg/mL injection  Inject 1 mL (1,000 mcg total) into the muscle once a week.      folic acid (FOLVITE) 1 MG tablet Take 1 tablet (1 mg total) by mouth once daily.  Qty: 30 tablet, Refills: 11           CONTINUE these medications which have NOT CHANGED    Details   carvediloL (COREG) 12.5 MG tablet Take 12.5 mg by mouth Daily.      hydroCHLOROthiazide (HYDRODIURIL) 12.5 MG Tab Take 12.5 mg by mouth once daily.      rivaroxaban (XARELTO ORAL) Take 20 mg by mouth Daily.      valsartan (DIOVAN) 40 MG tablet TAKE 1 TABLET (40 MG TOTAL) BY MOUTH ONCE DAILY. PT STATED TAKING 320MG ONCE DAILY  Qty: 90 tablet, Refills: 1                Immunizations Administered as of 6/7/2023       Name Date Dose VIS Date Route Exp Date    COVID-19, MRNA, LN-S, PF (Pfizer) (Purple Cap) 1/3/2022 0.3 mL -- Intramuscular --    Site: Left arm     : Pf[employer] Inc     Lot: [id number]     COVID-19, MRNA, LN-S, PF (Pfizer) (Purple Cap) 6/11/2021 0.3 mL -- Intramuscular --    Site: Left arm     : Pf[employer] Inc     Lot: [id number]     COVID-19, MRNA, LN-S, PF (Pfizer) (Purple Cap) 5/21/2021 0.3 mL -- Intramuscular --    Site: Left arm     : Pfizer Inc     Lot: [id number]             This patient has had both covid vaccinations    Some patients may experience side effects after vaccination.  These may include fever, headache, muscle or joint aches.  Most symptoms resolve with 24-48 hours and do not require urgent medical evaluation unless they persist for more than 72 hours or symptoms are concerning for an unrelated medical condition.          _________________________________  Jimbo Dean MD  [facility] Neurology PGY-1    06/07/2023

## 2023-06-07 NOTE — PLAN OF CARE
Continue OT POC. Moderate progression in visual scanning noted. Increased tone LUE and noted mild tremor in Left hand. Difficulty with bed mobility as evidenced by need of moderate assist x2 persons to maximum assist x1 person compared to prior session (moderate assist x1 person). Responsive to neuro re-education techniques. Recommending high intensity occupational therapy.     Problem: Occupational Therapy  Goal: Occupational Therapy Goal  Description: Goals to be met by: 7/3/2023     Patient will increase functional independence with ADLs by performing:    Self feeding with distant supervision in upright seated position with adaptations as needed (goal added 6/6/2023).  LE Dressing with Contact Guard Assistance with increased time.  Toileting from toilet with Modified independence and increased time for hygiene and clothing management.   Step transfer with Stand-by Assistance and AD as needed.  Toilet transfer to toilet with Stand-by Assistance and AD as needed.  Increase LUE proximal strength grossly to 3+/5.  100% reciprocation for patient education, CVA/BEFAST education, ADL/Mobility modifications, and visual scanning as appropriate.     Outcome: Ongoing, Progressing

## 2023-06-07 NOTE — NURSING
Shift assessment complete. Pt is alert and oriented x 3. Denies pain. Pt repositioned in bed. 02 @ 2L NC.  Bed in lowest position, locked, and side rails up x 3. Bed alarm on. Call light in reach. Door open.

## 2023-06-07 NOTE — PROGRESS NOTES
[facility] Medicine Progress Note    Primary Team: [facility] Hospitalist Team B  Attending Physician: Reji  Resident: Lisa/Billy  [facility]    [facility] Day: 2    Subjective:      Interval: NAEON per nursing.    This AM, he is resting comfortably in bed watching TV.  No complaints at this time. Denies f/c/n/v, sob, headache, cp.       Objective:   Last 24 Hour Vital Signs:  BP  Min: 142/77  Max: 194/91  Temp  Av °F (36.7 °C)  Min: 97.6 °F (36.4 °C)  Max: 98.8 °F (37.1 °C)  Pulse  Av.6  Min: 77  Max: 101  Resp  Av.3  Min: 18  Max: 20  SpO2  Av.4 %  Min: 94 %  Max: 98 %  No intake or output data in the 24 hours ending 23 0812     Physical Examination:  Physical Exam  Vitals and nursing note reviewed.   Constitutional:       Appearance: Normal appearance.   HENT:      Head: Normocephalic and atraumatic.      Right Ear: External ear normal.      Left Ear: External ear normal.      Nose: Nose normal. No congestion or rhinorrhea.      Mouth/Throat:      Mouth: Mucous membranes are moist.      Pharynx: Oropharynx is clear.   Eyes:      Pupils: Pupils are equal, round, and reactive to light.   Cardiovascular:      Rate and Rhythm: Normal rate and regular rhythm.   Pulmonary:      Effort: Pulmonary effort is normal.      Breath sounds: Normal breath sounds. No wheezing or rales.   Abdominal:      General: Abdomen is flat. Bowel sounds are normal.      Palpations: Abdomen is soft.   Musculoskeletal:      Right lower leg: No edema.      Left lower leg: No edema.   Skin:     Capillary Refill: Capillary refill takes less than 2 seconds.      Findings: No erythema or rash.   Neurological:      Mental Status: He is alert.      Comments: See detailed neuro exam below   Psychiatric:         Mood and Affect: Mood normal.         Behavior: Behavior normal.        NEUROLOGICAL    MENTAL STATUS: AAOx3  LANGUAGE/SPEECH: Naming and repetition intact, some fluency issues, follows 3-step  commands    CRANIAL NERVES:  II: Pupils equal and reactive, no RAPD, no VF deficits  III, IV, VI: EOM intact, slow lateral movements past midline with L gaze bilaterally, mild R gaze preference, no nystagmus.  V: normal sensation in V1, V2, and V3 segments bilaterally  VII: no asymmetry, mild L nasolabial fold flattening  VIII: normal hearing to speech  IX, X: normal palatal elevation, no uvular deviation  XI: 5/5 head turn and 5/5 shoulder shrug bilaterally  XII: midline tongue protrusion    MOTOR:    Muscle bulk and tone are normal.           Deltoid (C5) Bicep (C5-C6) Tricep (C6-C7) Wrist Extensor (C6-C7) Finger Flexion (C8) Finger Abduction (T1) Thumb Opposition (C8-T1)   Left 4 4 4 4 4 5 4   Right 5 5 5 5 5 5 5              Hip Flexion (L2-L3) Hip Extension (L4-L5) Hip Adduction (L2,L3,L4) Hip Abduction (L4, L5, S1) Knee Extension (L3-L4) Knee Flexion (L5-S1) Ankle Dorsiflexion (L4-L5) Ankle Plantarflexion (S1-S2) EHL (L5)   Left 4 4 4 5 5 5 5 5 5   Right 5 5 5 5 5 5 5 5 5         Deep Tendon Reflexes:          Bicep (C5-C6) Brachioradialis   (C5-C6) Tricep   (C7-C8) Patellar   (L2-L3) Achilles   (S1) Gambino Babinski   Left 3+ 3+ 3+ 3+ 2+ - -   Right 3+ 3+ 3+ 2+ 2+ - -         Sensory: Light touch, position sense are intact in fingers and toes.     Coordination:  There is no dysmetria on finger-to-nose and heel-knee-shin. There are no abnormal or extraneous movements.Resting tremor of L hand present     Gait/Stance:  Posture is normal                    NIH Stroke Scale       1a. Level of consciousness 0=alert; keenly responsive   1b. LOC questions 0=Answers both tasks correctly   1c. LOC commands 0=Answers both tasks correctly   2. Best gaze 0=normal   3. Visual 0=No visual loss   4.  Facial palsy 1=Minor paralysis (flattened nasolabial fold, asymmetric on smiling)   5.  Motor left arm 1=Drift, limb holds 90 (or 45) degrees but drifts down before full 10 seconds: does not hit bed   5b. Motor right arm 0=No  drift, limb holds 90 (or 45) degrees for full 10 seconds   6a. Motor left leg 1=Drift, limb holds 90 (or 45) degrees but drifts down before full 10 seconds: does not hit bed   6b. Motor right leg 0=No drift, limb holds 90 (or 45) degrees for full 10 seconds   7. Limb ataxia 1=Present in 1-limb   8. Sensory 1=Mild to moderate sensory loss; patient feels pinprick is less sharp or is dull on the affected side; there is a loss of superficial pain with pinprick but patient is aware He is being touched   9. Best language 1=Mild to moderate aphasia; some obvious loss of fluency or facility of comprehension without significant limitation on ideas expressed or form of expression.   10. Dysarthria 1=Mild to moderate, patient slurs at least some words and at worst, can be understood with some difficulty   11.  Extinction and inattention 0=No abnormality                                    TOTAL: 7         Laboratory:        Recent Labs   Lab 06/04/23  1635 06/05/23  0513 06/05/23  0514   WBC 10.06  --  8.95   HGB 13.5*  --  13.4*   HCT 41.6  --  41.1     --  267   *  --  102*   RDW 13.5  --  13.4    139  --    K 3.9 3.7  --     103  --    CO2 26 21*  --    BUN 14 13  --    CREATININE 1.2 1.1  --    * 108  --    PROT 6.7 7.3  --    ALBUMIN 3.4* 3.3*  --    BILITOT 1.2* 1.3*  --    AST 16 16  --    ALKPHOS 103 102  --    ALT 6* 6*  --         Laboratory:  Recent Labs   Lab 06/04/23  1635 06/05/23  0513 06/05/23  0514 06/06/23  0439   WBC 10.06  --  8.95 9.58   HGB 13.5*  --  13.4* 13.0*   HCT 41.6  --  41.1 40.6     --  267 245   *  --  102* 102*   RDW 13.5  --  13.4 13.4    139  --  139   K 3.9 3.7  --  4.1    103  --  102   CO2 26 21*  --  23   BUN 14 13  --  13   CREATININE 1.2 1.1  --  1.1   * 108  --  88   PROT 6.7 7.3  --  6.9   ALBUMIN 3.4* 3.3*  --  3.2*   BILITOT 1.2* 1.3*  --  1.6*   AST 16 16  --  17   ALKPHOS 103 102  --  95   ALT 6* 6*  --  7*       I  have personally reviewed the above laboratory studies.     Imaging:  EKG (my interpretation): No new today    Fl Modified Barium Swallow Speech   Final Result      As above.  Please see documentation from the speech pathologist for additional findings and recommendations.         Electronically signed by: Enmanuel Bell   Date:    06/06/2023   Time:    14:26      CTA Head and Neck (xpd)   Final Result      1. Unremarkable CTA head and neck.  No large vessel occlusion or high-grade stenosis.   2. Atherosclerotic disease of the bilateral carotid bifurcations, and the bilateral intracranial ICAs and vertebral arteries as above.   3. Known acute infarctions better evaluated on recently performed MRI.   4. Senescent changes and chronic microvascular ischemic changes.   5. Pulmonary artery dilation which can be seen with pulmonary hypertension.         Electronically signed by: Gurdeep Cho   Date:    06/05/2023   Time:    18:39      MRI Brain Without Contrast   Final Result   Abnormal      1. Multiple small acute infarctions in the left cerebellar hemisphere and in the right corona radiata.   2. Senescent changes and chronic microvascular ischemic changes.   This report was flagged in Epic as abnormal.      Dr. Gurdeep Cho discussed critical findings with Dr. Nyasia Cho by telephone at 19:04 on 06/04/2023.         Electronically signed by: Gurdeep Cho   Date:    06/04/2023   Time:    19:07      CT Head Without Contrast   Final Result      Small hypodensity in the left cerebellar hemisphere compatible with an age-indeterminate lacunar infarction.  Otherwise no acute intracranial abnormality.  Further evaluation with MRI as clinically indicated.         Electronically signed by: Gurdeep Cho   Date:    06/04/2023   Time:    17:06          Current Medications:  Scheduled:   aspirin  81 mg Oral Daily    atorvastatin  40 mg Oral Daily    carvediloL  12.5 mg Oral Daily    cyanocobalamin  1,000 mcg Intramuscular Weekly     folic acid  1 mg Oral Daily    rivaroxaban  20 mg Oral Daily with dinner    valsartan  40 mg Oral Daily     Infusions:    PRN:  acetaminophen, labetalol, pneumoc 20-richmond conj-dip cr(PF), sodium chloride 0.9%    Assessment:     Dejuan Correa is a 78 y.o.  male who has a past medical history of paroxysmal Atrial fibrillation, Supraventricular tachycardia, HTN, HLD, and alcohol use disorder with hx of complicated withdrawal. The patient presented to [facility] on 6/4/2023 with a primary complaint of slurred speech and left sided weakness the afternoon of admission, found to have multiple small acute L cerebellar and R corona radiata infarcts on MRI brain. Neurology consulted, appreciate recs. Xarelto restarted. Therapies recomending IPR for patient.    Plan:     Acute cerebellar CVA  - slurred speech + mild L sided weakness, L pronator drift in patient on Xarelto for pAfib  - NIH scale:  - not TPA candidate, on xarelto for P-afib  - CT head: age indeterminate hypodensity L cerebellum  - MRI brain: multiple small acute infarctions in the left cerebellar hemisphere and in the right corona radiata.  - multifocal CVA suggests embolic etiology- has rate controlled P-afib adherent to Xarelto  - TIA ABCD2 score = 6  - CTA without LVO or high grade stenosis; atherosclerotic disease of carotids bilaterally   - L-Neurology on board  - A1c 5.2, TSH wnl, B12 263, Folate 3.2  - Lipid panel with HDL 31, LDL 68  - Xarelto restarted per neuro recs on 6/6/23  Plan:  - Neurology recs  - Carotid US in process  - Frequent neuro-checks (q4h)  - Head of bed > 30 degrees for aspiration prevention and aspiration precautions ordered.  - Continue Atorvastatin 40 mg daily (long-term goal LDL < 70)  - Continue ASA 81 daily  - Continue Xarelto 20mg daily  - Stroke education and counseling  - Physical therapy, occupational therapy, speech therapy   - Recommend IPR   - Full pureed diet (IDDSI Level 4) with Nectar thick  fluids    Paroxysmal A-fib  - reported adherent to coreg and xarelto, multifocal CVA raises concern for thromboembolic source  - NS with normal rate since admission   - resumed xarelto 6/6/23 per neuro recs  Plan:  - Monitor on telemetry  - Continue coreg  - Continue Xarelto 20mg daily     HTN   - home meds: coreg 12.5 daily, HCTZ 12.5, valsartan 40   Plan:  - Continue home medications     HLD  - not on statin  - Lipid panel with HDL 31, LDL 68  Plan:  -Atorvastatin 40 mg daily (long-term goal LDL < 70)     Severe alcohol use disorder in early remission with hx complicated withdrawal  - reports no alcohol use since hospital discharge (last drink >7 days)  - ethanol <10 on admission  - no clinical evidence of acute withdrawal at this time  Plan:  - monitor on tele, CIWA Q6     Macrocytic anemia  - mild anemia, MCV>100, likely 2/2 alcohol use   - B12 and folate deficient  Plan:  - B12 1000mcg IM weekly for 4-weeks  - Folate 1mg PO daily      Diet: Diet Dysphagia Pureed (IDDSI Level 4) [facility]; Standard Tray; Nectar Thick   DVT Prophylaxis: SCDs until neuro eval for LMWH  Code: Full Code     Social: daughter vineet updated over phone, brother updated at bedside  Dispo: Pending IPR acceptance    Jimbo Dean MD  [facility] Neurology PGY-1      [facility] Hospitalist Pager numbers:   [facility] Hospitalist Medicine Team A (Anila/Priscilla): [phone]  [facility] Hospitalist Medicine Team B (Reji/Samina):  [phone]

## 2023-06-07 NOTE — PT/OT/SLP PROGRESS
Physical Therapy      Patient Name:  Dejuan Correa   MRN:  [id number]    Patient not seen today secondary to  (pt CHARISSA in testing(1400)). Will follow-up tomorrow.

## 2023-06-08 LAB
ALBUMIN SERPL BCP-MCNC: 3.2 G/DL (ref 3.5–5.2)
ALP SERPL-CCNC: 99 U/L (ref 55–135)
ALT SERPL W/O P-5'-P-CCNC: <5 U/L (ref 10–44)
ANION GAP SERPL CALC-SCNC: 15 MMOL/L (ref 8–16)
AST SERPL-CCNC: 18 U/L (ref 10–40)
BACTERIA #/AREA URNS HPF: ABNORMAL /HPF
BILIRUB SERPL-MCNC: 1.8 MG/DL (ref 0.1–1)
BILIRUB UR QL STRIP: NEGATIVE
BUN SERPL-MCNC: 22 MG/DL (ref 8–23)
CALCIUM SERPL-MCNC: 9.6 MG/DL (ref 8.7–10.5)
CHLORIDE SERPL-SCNC: 101 MMOL/L (ref 95–110)
CLARITY UR: CLEAR
CO2 SERPL-SCNC: 27 MMOL/L (ref 23–29)
COLOR UR: ABNORMAL
CREAT SERPL-MCNC: 1.3 MG/DL (ref 0.5–1.4)
ERYTHROCYTE [DISTWIDTH] IN BLOOD BY AUTOMATED COUNT: 13.5 % (ref 11.5–14.5)
EST. GFR  (NO RACE VARIABLE): 56 ML/MIN/1.73 M^2
GLUCOSE SERPL-MCNC: 111 MG/DL (ref 70–110)
GLUCOSE UR QL STRIP: NEGATIVE
HCT VFR BLD AUTO: 38.9 % (ref 40–54)
HGB BLD-MCNC: 12.5 G/DL (ref 14–18)
HGB UR QL STRIP: NEGATIVE
HYALINE CASTS #/AREA URNS LPF: 13 /LPF
KETONES UR QL STRIP: NEGATIVE
LEUKOCYTE ESTERASE UR QL STRIP: NEGATIVE
MAGNESIUM SERPL-MCNC: 1.3 MG/DL (ref 1.6–2.6)
MCH RBC QN AUTO: 32.9 PG (ref 27–31)
MCHC RBC AUTO-ENTMCNC: 32.1 G/DL (ref 32–36)
MCV RBC AUTO: 102 FL (ref 82–98)
MICROSCOPIC COMMENT: ABNORMAL
NITRITE UR QL STRIP: NEGATIVE
PH UR STRIP: 6 [PH] (ref 5–8)
PLATELET # BLD AUTO: 264 K/UL (ref 150–450)
PMV BLD AUTO: 11.4 FL (ref 9.2–12.9)
POCT GLUCOSE: 118 MG/DL (ref 70–110)
POCT GLUCOSE: 124 MG/DL (ref 70–110)
POCT GLUCOSE: 131 MG/DL (ref 70–110)
POCT GLUCOSE: 132 MG/DL (ref 70–110)
POTASSIUM SERPL-SCNC: 3.9 MMOL/L (ref 3.5–5.1)
PROT SERPL-MCNC: 7.2 G/DL (ref 6–8.4)
PROT UR QL STRIP: ABNORMAL
RBC # BLD AUTO: 3.8 M/UL (ref 4.6–6.2)
RBC #/AREA URNS HPF: 0 /HPF (ref 0–4)
SODIUM SERPL-SCNC: 143 MMOL/L (ref 136–145)
SP GR UR STRIP: 1.02 (ref 1–1.03)
SQUAMOUS #/AREA URNS HPF: 3 /HPF
URN SPEC COLLECT METH UR: ABNORMAL
UROBILINOGEN UR STRIP-ACNC: ABNORMAL EU/DL
WBC # BLD AUTO: 16.83 K/UL (ref 3.9–12.7)
WBC #/AREA URNS HPF: 3 /HPF (ref 0–5)

## 2023-06-08 PROCEDURE — 97530 THERAPEUTIC ACTIVITIES: CPT | Mod: CQ

## 2023-06-08 PROCEDURE — 11000001 HC ACUTE MED/SURG PRIVATE ROOM

## 2023-06-08 PROCEDURE — 27000221 HC OXYGEN, UP TO 24 HOURS

## 2023-06-08 PROCEDURE — 81000 URINALYSIS NONAUTO W/SCOPE: CPT | Performed by: STUDENT IN AN ORGANIZED HEALTH CARE EDUCATION/TRAINING PROGRAM

## 2023-06-08 PROCEDURE — 63600175 PHARM REV CODE 636 W HCPCS: Performed by: STUDENT IN AN ORGANIZED HEALTH CARE EDUCATION/TRAINING PROGRAM

## 2023-06-08 PROCEDURE — 92526 ORAL FUNCTION THERAPY: CPT

## 2023-06-08 PROCEDURE — 97535 SELF CARE MNGMENT TRAINING: CPT

## 2023-06-08 PROCEDURE — 83735 ASSAY OF MAGNESIUM: CPT | Performed by: STUDENT IN AN ORGANIZED HEALTH CARE EDUCATION/TRAINING PROGRAM

## 2023-06-08 PROCEDURE — 36415 COLL VENOUS BLD VENIPUNCTURE: CPT | Performed by: STUDENT IN AN ORGANIZED HEALTH CARE EDUCATION/TRAINING PROGRAM

## 2023-06-08 PROCEDURE — 25000003 PHARM REV CODE 250: Performed by: STUDENT IN AN ORGANIZED HEALTH CARE EDUCATION/TRAINING PROGRAM

## 2023-06-08 PROCEDURE — 94761 N-INVAS EAR/PLS OXIMETRY MLT: CPT

## 2023-06-08 PROCEDURE — 25000003 PHARM REV CODE 250

## 2023-06-08 PROCEDURE — 85027 COMPLETE CBC AUTOMATED: CPT | Performed by: STUDENT IN AN ORGANIZED HEALTH CARE EDUCATION/TRAINING PROGRAM

## 2023-06-08 PROCEDURE — 80053 COMPREHEN METABOLIC PANEL: CPT | Performed by: STUDENT IN AN ORGANIZED HEALTH CARE EDUCATION/TRAINING PROGRAM

## 2023-06-08 PROCEDURE — 97530 THERAPEUTIC ACTIVITIES: CPT

## 2023-06-08 PROCEDURE — 92507 TX SP LANG VOICE COMM INDIV: CPT

## 2023-06-08 RX ORDER — LEVOFLOXACIN 750 MG/1
750 TABLET ORAL DAILY
Status: DISCONTINUED | OUTPATIENT
Start: 2023-06-08 | End: 2023-06-08

## 2023-06-08 RX ORDER — AZITHROMYCIN 250 MG/1
500 TABLET, FILM COATED ORAL DAILY
Status: COMPLETED | OUTPATIENT
Start: 2023-06-09 | End: 2023-06-11

## 2023-06-08 RX ORDER — MAGNESIUM SULFATE HEPTAHYDRATE 40 MG/ML
2 INJECTION, SOLUTION INTRAVENOUS ONCE
Status: COMPLETED | OUTPATIENT
Start: 2023-06-08 | End: 2023-06-08

## 2023-06-08 RX ADMIN — CARVEDILOL 12.5 MG: 12.5 TABLET, FILM COATED ORAL at 08:06

## 2023-06-08 RX ADMIN — FOLIC ACID 1 MG: 1 TABLET ORAL at 08:06

## 2023-06-08 RX ADMIN — MAGNESIUM SULFATE 2 G: 2 INJECTION INTRAVENOUS at 08:06

## 2023-06-08 RX ADMIN — RIVAROXABAN 20 MG: 20 TABLET, FILM COATED ORAL at 05:06

## 2023-06-08 RX ADMIN — LEVOFLOXACIN 750 MG: 750 TABLET, FILM COATED ORAL at 02:06

## 2023-06-08 RX ADMIN — ASPIRIN 81 MG: 81 TABLET, COATED ORAL at 08:06

## 2023-06-08 RX ADMIN — ACETAMINOPHEN 650 MG: 325 TABLET ORAL at 11:06

## 2023-06-08 RX ADMIN — VALSARTAN 40 MG: 40 TABLET, FILM COATED ORAL at 08:06

## 2023-06-08 RX ADMIN — HYDROCHLOROTHIAZIDE 12.5 MG: 12.5 TABLET ORAL at 08:06

## 2023-06-08 RX ADMIN — ATORVASTATIN CALCIUM 40 MG: 40 TABLET, FILM COATED ORAL at 08:06

## 2023-06-08 RX ADMIN — ACETAMINOPHEN 650 MG: 325 TABLET ORAL at 10:06

## 2023-06-08 NOTE — PLAN OF CARE
"ELLEN communicated with Prerna with [facility]   Prior to denial - MD's invited to participate in a peer to peer conference   IM MD team informed - " by 2:30 pm CST. They ask that you call  opt. 5 This is a direct line to the Med Director. Please given them pt's name, : ([id number]) and insurance ID #: [id number] - "    Prior to peer to peer -- Pt's status changed - now requiring O2     DC needs to be reevaluated.  May need to re-submit referral to [facility].     Daughter Alejandra  [phone];  Brother         23 1814   Post-Acute Status   Post-Acute Authorization Placement   Post-Acute Placement Status Pending payor medical review/second level review   Discharge Plan   Discharge Plan A Rehab   Discharge Plan B Skilled Nursing Facility       "

## 2023-06-08 NOTE — PLAN OF CARE
Continue OT POC. Downgrade goals to moderate assist 2/2 increased physical assist for functional mobility. Planned co-treatment completed. Unable to achieve erect stand with two people, but continues to be motivated to participate. Continued education for feeding strategy and oral care for carryover by nursing staff. Recommend high intensity occupational therapy.      Problem: Occupational Therapy  Goal: Occupational Therapy Goal  Description: Goals to be met by: 7/3/2023     Patient will increase functional independence with ADLs by performing:    Self feeding with distant supervision in upright seated position with adaptations as needed (goal added 6/6/2023).  LE Dressing with Contact Guard Assistance with increased time. (downgrade goal to moderate assist 6/8/2023)  Toileting from toilet with Modified independence and increased time for hygiene and clothing management. (Downgrade goal to moderate assist 6/8/2023)  Step transfer with Stand-by Assistance and AD as needed. (Downgrade goal to moderate assist 6/8/2023)  Toilet transfer to toilet with Stand-by Assistance and AD as needed. (Downgrade goal to moderate assist to drop arm commode 6/8/2023)  Increase LUE proximal strength grossly to 3+/5.  100% reciprocation for patient education, CVA/BEFAST education, ADL/Mobility modifications, and visual scanning as appropriate.     Outcome: Ongoing, Limited

## 2023-06-08 NOTE — NURSING
RAPID RESPONSE NURSE PROACTIVE ROUNDING NOTE       Time of Visit: 1315    Admit Date: 2023  LOS: 3  Code Status: Full Code   Date of Visit: 2023  : 1945  Age: 78 y.o.  Sex: male  Race: Black or   Bed: K430/K430 A:   MRN: [id number]  Was the patient discharged from an ICU this admission? No   Was the patient discharged from a PACU within last 24 hours? No   Did the patient receive conscious sedation/general anesthesia in last 24 hours? No   Was the patient in the ED within the past 24 hours? No   Was the patient on NIPPV within the past 24 hours? No   Attending Physician: Job Mendoza MD  Primary Service: [facility] HOSPITALIST TEAM B   Time spent at the bedside: < 15 min    SITUATION    Notified by bedside RN via phone call  Reason for alert: PIV insertion    ASSESSMENT/INTERVENTIONS    20g USG PIV inserted to right forearm    Disposition:Remain in room 430    FOLLOW UP    Call back the Rapid Response NurseIvonne at [phone] for additional questions or concerns.

## 2023-06-08 NOTE — NURSING
As given in report from previous nurse, Ana Paula, pt has been having left hand tremors since this morning and the Drs are already aware of it.

## 2023-06-08 NOTE — PROGRESS NOTES
06/07/23 2037   Nurse Notification   Charge Nurse Notified? Yes   Name of Charge Nurse SERAFIN Garsia   Name of Bedside Nurse MEGAN Mccarthy   Nurse Notfication Method Secure Chat   Provider Notification   Provider Notified? Yes   Name of Provider Dr. Gonzalez   Provider Notification Method Telephone   Provider Notified Of AI Deterioration Alert

## 2023-06-08 NOTE — AI DETERIORATION ALERT
Artificial Intelligence Notification  Julian      Admit Date: 2023  LOS: 2  Code Status: Full Code   Date of Consult: 2023  : 1945  Age: 78 y.o.  Weight:   Wt Readings from Last 1 Encounters:   23 99.3 kg (218 lb 14.7 oz)     Sex: male  Bed: 30/30 A:   MRN: [id number]  Attending Physician: Job Mendoza MD  Primary Service: [facility] HOSPITALIST TEAM B  Time AI Alert Received: 838PM  Time at Bedside: 840PM           Patient seen and examined by me at the bedside. No change in exam from earlier today.       Vital Signs (Most Recent):  Temp: 99.4 °F (37.4 °C) (23)  Pulse: 92 (23)  Resp: 19 (23)  BP: (!) 144/90 (23)  SpO2: (!) 91 % (23)   Vital Signs (24h Range):  Temp:  [96.9 °F (36.1 °C)-99.4 °F (37.4 °C)] 99.4 °F (37.4 °C)  Pulse:  [] 92  Resp:  [18-20] 19  SpO2:  [90 %-98 %] 91 %  BP: (132-152)/(68-90) 144/90         This encounter was triggered by an Artificial Intelligence Notification.     Artificial Intelligence alert discussed with Primary team:  [facility] Medicine (Dr. Dean and Dr. Gonzalez)      Evaluation: Patient seen and examined by me at the bedside. No change in exam from earlier today. Vitals WNL. Alert and oriented x3.    Disposition: Placement in [facility] for discharge

## 2023-06-08 NOTE — PT/OT/SLP PROGRESS
Physical Therapy Treatment    Patient Name:  Dejuan Correa   MRN:  [id number]    Recommendations:     Discharge Recommendations:  (high intensity therapy)  Discharge Equipment Recommendations:  (TBD)  Barriers to discharge:  decreased mobility,strength and endurance    Assessment:     Dejuan Correa is a 78 y.o. male admitted with a medical diagnosis of Cerebellar stroke.  He presents with the following impairments/functional limitations: weakness, impaired endurance, impaired functional mobility, impaired balance, decreased upper extremity function, decreased lower extremity function, pain, decreased ROM, impaired coordination, impaired fine motor, impaired joint extensibility,pt with good participation and requires increased assistance by two with bed mobility and sit-stand t/f's,pt with increased lateral lean to L in sitting and heavier in standing,pt will benefit from high intensity therapy upon discharge    Rehab Prognosis: Fair; patient would benefit from acute skilled PT services to address these deficits and reach maximum level of function.    Recent Surgery: * No surgery found *      Plan:     During this hospitalization, patient to be seen 6 x/week to address the identified rehab impairments via gait training, therapeutic activities, therapeutic exercises, neuromuscular re-education and progress toward the following goals:    Plan of Care Expires:  07/05/23    Subjective     Chief Complaint: n/a  Patient/Family Comments/goals: pt agreeable to rx.  Pain/Comfort:  Pain Rating 1:  (no rating)  Location - Side 1: Left  Location 1: knee (with movement)  Pain Addressed 1: Reposition, Distraction, Cessation of Activity      Objective:     Communicated with nsg prior to session.  Patient found supine with bed alarm, Condom Catheter, telemetry upon PT entry to room.     General Precautions: Standard, aspiration, fall  Orthopedic Precautions: N/A  Braces: N/A  Respiratory Status: Room air     Functional  Mobility:  Bed Mobility:     Supine to Sit: maximal assistance and of 2 persons  Sit to Supine: maximal assistance and of 2 persons  Transfers:     Sit to Stand:  maximal assistance, total assistance, and of 2 persons with rolling walker and X 2 trials with increased lateral lean to L  Balance: poor sitting balance      AM-PAC 6 CLICK MOBILITY  Turning over in bed (including adjusting bedclothes, sheets and blankets)?: 2  Sitting down on and standing up from a chair with arms (e.g., wheelchair, bedside commode, etc.): 2  Moving from lying on back to sitting on the side of the bed?: 2  Moving to and from a bed to a chair (including a wheelchair)?: 2  Need to walk in hospital room?: 1  Climbing 3-5 steps with a railing?: 1  Basic Mobility Total Score: 10       Treatment & Education: pt sat EOB ~15 mins with CGA/Min A and increased lateral lean to L,reaching activities with Mod/Max A and cues,pt to HOB with Tot A X 2,rolling L and R Max A.      Patient left supine with all lines intact, call button in reach, bed alarm on, and nsg notified..    GOALS: see general POC  Multidisciplinary Problems       Physical Therapy Goals          Problem: Physical Therapy    Goal Priority Disciplines Outcome Goal Variances Interventions   Physical Therapy Goal     PT, PT/OT Ongoing, Not Progressing     Description: Goals to be met by: 2023     Patient will increase functional independence with mobility by performin. Supine to sit with MInimal Assistance  2. Sit to supine with MInimal Assistance  3. Rolling to Left and Right with Minimal Assistance.  4. Sit to stand transfer with Minimal Assistance  5. Bed to chair transfer with Minimal Assistance using Rolling Walker  6. Gait  x 10 feet with Maximum Assistance using LRAD.  7. Stand for 3 minutes with Moderate Assistance using LRAD to perform a functional activity.                           Time Tracking:     PT Received On: 23  PT Start Time: 1323     PT Stop Time:  1401  PT Total Time (min): 38 min     Billable Minutes: Therapeutic Activity 38       PT/PTA: PTA     Number of PTA visits since last PT visit: 2     06/08/2023

## 2023-06-08 NOTE — PROGRESS NOTES
[facility] Medicine Progress Note    Primary Team: [facility] Hospitalist Team B  Attending Physician: Reji  Resident: Lisa/Billy  [facility]    [facility] 3    Subjective:      Interval: NAEON per nursing.    This AM, he is resting comfortably in bed watching TV.  No complaints at this time. Denies f/c/n/v, sob, headache, cp.       Objective:   Last 24 Hour Vital Signs:  BP  Min: 132/70  Max: 157/74  Temp  Av.1 °F (36.7 °C)  Min: 96.9 °F (36.1 °C)  Max: 99.4 °F (37.4 °C)  Pulse  Av.6  Min: 79  Max: 95  Resp  Av.5  Min: 18  Max: 20  SpO2  Av.9 %  Min: 90 %  Max: 100 %  Weight  Av.4 kg (216 lb 14.9 oz)  Min: 98.4 kg (216 lb 14.9 oz)  Max: 98.4 kg (216 lb 14.9 oz)    Intake/Output Summary (Last 24 hours) at 2023 0833  Last data filed at 2023 0811  Gross per 24 hour   Intake --   Output 850 ml   Net -850 ml        Physical Examination:  Physical Exam  Vitals and nursing note reviewed.   Constitutional:       Appearance: Normal appearance.   HENT:      Head: Normocephalic and atraumatic.      Right Ear: External ear normal.      Left Ear: External ear normal.      Nose: Nose normal. No congestion or rhinorrhea.      Mouth/Throat:      Mouth: Mucous membranes are moist.      Pharynx: Oropharynx is clear.   Eyes:      Pupils: Pupils are equal, round, and reactive to light.   Cardiovascular:      Rate and Rhythm: Normal rate and regular rhythm.   Pulmonary:      Effort: Pulmonary effort is normal.      Breath sounds: Normal breath sounds. No wheezing or rales.   Abdominal:      General: Abdomen is flat. Bowel sounds are normal.      Palpations: Abdomen is soft.   Musculoskeletal:      Right lower leg: No edema.      Left lower leg: No edema.   Skin:     Capillary Refill: Capillary refill takes less than 2 seconds.      Findings: No erythema or rash.   Neurological:      Mental Status: He is alert.      Comments: See detailed neuro exam below   Psychiatric:         Mood  and Affect: Mood normal.         Behavior: Behavior normal.        NEUROLOGICAL    MENTAL STATUS: AAOx3  LANGUAGE/SPEECH: Naming and repetition intact, some fluency issues, follows 3-step commands    CRANIAL NERVES:  II: Pupils equal and reactive, no RAPD, no VF deficits  III, IV, VI: EOM intact, slow lateral movements past midline with L gaze bilaterally, mild R gaze preference, no nystagmus.  V: normal sensation in V1, V2, and V3 segments bilaterally  VII: no asymmetry, mild L nasolabial fold flattening  VIII: normal hearing to speech  IX, X: normal palatal elevation, no uvular deviation  XI: 5/5 head turn and 5/5 shoulder shrug bilaterally  XII: midline tongue protrusion    MOTOR:    Muscle bulk and tone are normal.           Deltoid (C5) Bicep (C5-C6) Tricep (C6-C7) Wrist Extensor (C6-C7) Finger Flexion (C8) Finger Abduction (T1) Thumb Opposition (C8-T1)   Left 4 4 4 4 4 5 4   Right 5 5 5 5 5 5 5              Hip Flexion (L2-L3) Hip Extension (L4-L5) Hip Adduction (L2,L3,L4) Hip Abduction (L4, L5, S1) Knee Extension (L3-L4) Knee Flexion (L5-S1) Ankle Dorsiflexion (L4-L5) Ankle Plantarflexion (S1-S2) EHL (L5)   Left 4 4 4 5 5 5 5 5 5   Right 5 5 5 5 5 5 5 5 5         Deep Tendon Reflexes:          Bicep (C5-C6) Brachioradialis   (C5-C6) Tricep   (C7-C8) Patellar   (L2-L3) Achilles   (S1) Gambino Babinski   Left 3+ 3+ 3+ 3+ 2+ - -   Right 3+ 3+ 3+ 2+ 2+ - -         Sensory: Light touch, position sense are intact in fingers and toes.     Coordination:  There is no dysmetria on finger-to-nose and heel-knee-shin. There are no abnormal or extraneous movements.  Resting tremor of L hand present     Gait/Stance:  Posture is normal                    NIH Stroke Scale       1a. Level of consciousness 0=alert; keenly responsive   1b. LOC questions 0=Answers both tasks correctly   1c. LOC commands 0=Answers both tasks correctly   2. Best gaze 0=normal   3. Visual 0=No visual loss   4.  Facial palsy 1=Minor paralysis  (flattened nasolabial fold, asymmetric on smiling)   5.  Motor left arm 1=Drift, limb holds 90 (or 45) degrees but drifts down before full 10 seconds: does not hit bed   5b. Motor right arm 0=No drift, limb holds 90 (or 45) degrees for full 10 seconds   6a. Motor left leg 1=Drift, limb holds 90 (or 45) degrees but drifts down before full 10 seconds: does not hit bed   6b. Motor right leg 0=No drift, limb holds 90 (or 45) degrees for full 10 seconds   7. Limb ataxia 1=Present in 1-limb   8. Sensory 1=Mild to moderate sensory loss; patient feels pinprick is less sharp or is dull on the affected side; there is a loss of superficial pain with pinprick but patient is aware He is being touched   9. Best language 1=Mild to moderate aphasia; some obvious loss of fluency or facility of comprehension without significant limitation on ideas expressed or form of expression.   10. Dysarthria 1=Mild to moderate, patient slurs at least some words and at worst, can be understood with some difficulty   11.  Extinction and inattention 0=No abnormality                                    TOTAL: 7         Laboratory:        Recent Labs   Lab 06/04/23  1635 06/05/23  0513 06/05/23  0514   WBC 10.06  --  8.95   HGB 13.5*  --  13.4*   HCT 41.6  --  41.1     --  267   *  --  102*   RDW 13.5  --  13.4    139  --    K 3.9 3.7  --     103  --    CO2 26 21*  --    BUN 14 13  --    CREATININE 1.2 1.1  --    * 108  --    PROT 6.7 7.3  --    ALBUMIN 3.4* 3.3*  --    BILITOT 1.2* 1.3*  --    AST 16 16  --    ALKPHOS 103 102  --    ALT 6* 6*  --         Laboratory:  Recent Labs   Lab 06/04/23  1635 06/05/23  0513 06/05/23  0514 06/06/23  0439 06/08/23  0346   WBC 10.06  --  8.95 9.58 16.83*   HGB 13.5*  --  13.4* 13.0* 12.5*   HCT 41.6  --  41.1 40.6 38.9*     --  267 245 264   *  --  102* 102* 102*   RDW 13.5  --  13.4 13.4 13.5    139  --  139 143   K 3.9 3.7  --  4.1 3.9    103  --  102  101   CO2 26 21*  --  23 27   BUN 14 13  --  13 22   CREATININE 1.2 1.1  --  1.1 1.3   * 108  --  88 111*   PROT 6.7 7.3  --  6.9 7.2   ALBUMIN 3.4* 3.3*  --  3.2* 3.2*   BILITOT 1.2* 1.3*  --  1.6* 1.8*   AST 16 16  --  17 18   ALKPHOS 103 102  --  95 99   ALT 6* 6*  --  7* <5*       I have personally reviewed the above laboratory studies.     Imaging:  EKG (my interpretation): No new today    X-Ray Chest AP Portable   Final Result      Slight obscuration of the left diaphragmatic border when compared to radiograph dated 05/26/2023, possibly related to adjacent atelectasis or developing consolidation.  Follow-up radiograph recommended.         Electronically signed by: Juan Manuel Barnes MD   Date:    06/08/2023   Time:    08:25      US Carotid Bilateral   Final Result      No evidence of a hemodynamically significant carotid bifurcation stenosis.         Electronically signed by: Hipolito Paul Jr   Date:    06/07/2023   Time:    15:02      Fl Modified Barium Swallow Speech   Final Result      As above.  Please see documentation from the speech pathologist for additional findings and recommendations.         Electronically signed by: Enmanuel Bell   Date:    06/06/2023   Time:    14:26      CTA Head and Neck (xpd)   Final Result      1. Unremarkable CTA head and neck.  No large vessel occlusion or high-grade stenosis.   2. Atherosclerotic disease of the bilateral carotid bifurcations, and the bilateral intracranial ICAs and vertebral arteries as above.   3. Known acute infarctions better evaluated on recently performed MRI.   4. Senescent changes and chronic microvascular ischemic changes.   5. Pulmonary artery dilation which can be seen with pulmonary hypertension.         Electronically signed by: Gurdeep Cho   Date:    06/05/2023   Time:    18:39      MRI Brain Without Contrast   Final Result   Abnormal      1. Multiple small acute infarctions in the left cerebellar hemisphere and in the right corona  radiata.   2. Senescent changes and chronic microvascular ischemic changes.   This report was flagged in Epic as abnormal.      Dr. Gurdeep Cho discussed critical findings with Dr. Nyasia Cho by telephone at 19:04 on 06/04/2023.         Electronically signed by: Gurdeep Cho   Date:    06/04/2023   Time:    19:07      CT Head Without Contrast   Final Result      Small hypodensity in the left cerebellar hemisphere compatible with an age-indeterminate lacunar infarction.  Otherwise no acute intracranial abnormality.  Further evaluation with MRI as clinically indicated.         Electronically signed by: Gurdeep Coh   Date:    06/04/2023   Time:    17:06          Current Medications:  Scheduled:   aspirin  81 mg Oral Daily    atorvastatin  40 mg Oral Daily    carvediloL  12.5 mg Oral Daily    cyanocobalamin  1,000 mcg Intramuscular Weekly    folic acid  1 mg Oral Daily    hydroCHLOROthiazide  12.5 mg Oral Daily    magnesium sulfate IVPB  2 g Intravenous Once    rivaroxaban  20 mg Oral Daily with dinner    valsartan  40 mg Oral Daily     Infusions:    PRN:  acetaminophen, labetalol, pneumoc 20-richmond conj-dip cr(PF), sodium chloride 0.9%    Assessment:     Dejuan Correa is a 78 y.o.  male who has a past medical history of paroxysmal Atrial fibrillation, Supraventricular tachycardia, HTN, HLD, and alcohol use disorder with hx of complicated withdrawal. The patient presented to [facility] on 6/4/2023 with a primary complaint of slurred speech and left sided weakness the afternoon of admission, found to have multiple small acute L cerebellar and R corona radiata infarcts on MRI brain. Neurology consulted, appreciate recs. Xarelto restarted. Therapies recomending IPR for patient. New leukocytosis on AM labs 6/8/23; UA and CXR in process.    Plan:     Acute cerebellar CVA  - slurred speech + mild L sided weakness, L pronator drift in patient on Xarelto for pAfib  - NIH scale:  - not TPA candidate, on  xarelto for P-afib  - CT head: age indeterminate hypodensity L cerebellum  - MRI brain: multiple small acute infarctions in the left cerebellar hemisphere and in the right corona radiata.  - multifocal CVA suggests embolic etiology- has rate controlled P-afib adherent to Xarelto  - TIA ABCD2 score = 6  - CTA without LVO or high grade stenosis; atherosclerotic disease of carotids bilaterally   - L-Neurology on board  - A1c 5.2, TSH wnl, B12 263, Folate 3.2  - Lipid panel with HDL 31, LDL 68  - Xarelto restarted per neuro recs on 6/6/23  - Carotid US w/o significant stenosis   Plan:  - Neurology recs  - Frequent neuro-checks (q4h)  - Head of bed > 30 degrees for aspiration prevention and aspiration precautions ordered.  - Continue Atorvastatin 40 mg daily (long-term goal LDL < 70)  - Continue ASA 81 daily  - Continue Xarelto 20mg daily  - Stroke education and counseling  - Physical therapy, occupational therapy, speech therapy   - Recommend IPR   - Full pureed diet (IDDSI Level 4) with Nectar thick fluids    Leukocytosis  - WBC 16.83 on 06/8/23  - Afebrile, benign physical exam  Plan:  - CXR for possible aspiration pneumonia  - UA ordered     Paroxysmal A-fib  - reported adherent to coreg and xarelto, multifocal CVA raises concern for thromboembolic source  - NS with normal rate since admission   - resumed xarelto 6/6/23 per neuro recs  Plan:  - Monitor on telemetry  - Continue coreg  - Continue Xarelto 20mg daily     HTN   - home meds: coreg 12.5 daily, HCTZ 12.5, valsartan 40   Plan:  - Continue home medications     HLD  - not on statin  - Lipid panel with HDL 31, LDL 68  Plan:  -Atorvastatin 40 mg daily (long-term goal LDL < 70)     Severe alcohol use disorder in early remission with hx complicated withdrawal  - reports no alcohol use since hospital discharge (last drink >7 days)  - ethanol <10 on admission  - no clinical evidence of acute withdrawal at this time  Plan:  - monitor on tele, CIWA Q6     Macrocytic  anemia  - mild anemia, MCV>100, likely 2/2 alcohol use   - B12 and folate deficient  Plan:  - B12 1000mcg IM weekly for 4-weeks  - Folate 1mg PO daily      Diet: Diet Dysphagia Pureed (IDDSI Level 4) [facility]; Standard Tray; Nectar Thick   DVT Prophylaxis: SCDs until neuro eval for LMWH  Code: Full Code     Social: daughter vineet updated over phone, brother updated at bedside  Dispo: Pending IPR acceptance    Jimbo Dean MD  [facility] Neurology PGY-1      [facility] Medicine Hospitalist Pager numbers:   [facility] Hospitalist Medicine Team A (Anila/Priscilla): [phone]  [facility] Hospitalist Medicine Team B (Reji/Samina):  [phone]

## 2023-06-08 NOTE — PLAN OF CARE
Problem: Physical Therapy  Goal: Physical Therapy Goal  Description: Goals to be met by: 2023     Patient will increase functional independence with mobility by performin. Supine to sit with MInimal Assistance  2. Sit to supine with MInimal Assistance  3. Rolling to Left and Right with Minimal Assistance.  4. Sit to stand transfer with Minimal Assistance  5. Bed to chair transfer with Minimal Assistance using Rolling Walker  6. Gait  x 10 feet with Maximum Assistance using LRAD.  7. Stand for 3 minutes with Moderate Assistance using LRAD to perform a functional activity.      Outcome: Ongoing, Not Progressing

## 2023-06-08 NOTE — PT/OT/SLP PROGRESS
Occupational Therapy   Treatment    Name: Dejuan Correa  MRN: [id number]  Admitting Diagnosis:  Cerebellar stroke       Recommendations:     Discharge Recommendations: other (see comments) (High intensity therapy)  Discharge Equipment Recommendations:  other (see comments) (defer to post acute care)  Barriers to discharge:  Other (Comment) (unable to ambulate; high fall risk; assist for all ADLs)    Assessment:     Dejuan Correa is a 78 y.o. male with a medical diagnosis of Cerebellar stroke.  He presents with ..The primary encounter diagnosis was Cerebellar stroke. Diagnoses of Stroke and Acute CVA (cerebrovascular accident) were also pertinent to this visit. . Performance deficits affecting function are weakness, impaired endurance, impaired self care skills, impaired functional mobility, decreased coordination, impaired balance, gait instability, decreased upper extremity function, decreased lower extremity function, decreased safety awareness, pain, abnormal tone, edema, impaired fine motor, impaired coordination, decreased ROM.     Continue OT POC. Downgrade goals to moderate assist 2/2 increased physical assist for functional mobility. Planned co-treatment completed. Unable to achieve erect stand with two people, but continues to be motivated to participate. Continued education for feeding strategy and oral care for carryover by nursing staff. Recommend high intensity occupational therapy.     Rehab Prognosis:  Good and Fair; patient would benefit from acute skilled OT services to address these deficits and reach maximum level of function.       Plan:     Patient to be seen 5 x/week to address the above listed problems via self-care/home management, therapeutic activities, therapeutic exercises, neuromuscular re-education  Plan of Care Expires: 07/03/23  Plan of Care Reviewed with: patient    Subjective     Chief Complaint: Pain in Left knee and ankle with movement  Patient/Family Comments/goals: Motivated  "to keep moving  Pain/Comfort:  Pain Rating 1: other (see comments) ("A little" pain in Left knee and ankle with movement)  Pain Addressed 1: Nurse notified, Cessation of Activity  Pain Rating Post-Intervention 1: other (see comments) ("A little" pain in Left knee and Ankle)    Objective:     Communicated with: nursing prior to session.  Patient found HOB elevated with bed alarm, telemetry, Condom Catheter, oxygen upon OT entry to room.    General Precautions: Standard, fall, aspiration    Orthopedic Precautions:N/A  Braces: N/A  Respiratory Status: Nasal cannula, flow 3 L/min     Occupational Performance:     Bed Mobility:    Patient completed Rolling/Turning to Left with  maximal assistance and 1 persons  Patient completed Rolling/Turning to Right with maximal assistance and 1 persons  Patient completed Scooting with dependent and 2 persons  Patient completed Supine to Sit with maximal assistance, 2 persons, and with side rail  Patient completed Sit to Supine with maximal assistance and 2 persons     Functional Mobility/Transfers:  Patient completed Sit <> Stand Transfer with maximal assistance, total assistance, and of 2 persons  with  rolling walker   Functional Mobility: Two trials of sit to stand with maximal assist x2 persons to total assist x2 person; increased Left lateral lean.    Activities of Daily Living:  Feeding:  stand by assistance with verbal cueing to swallow between bites  Grooming: stand by assistance with verbal cueing/one step directions to wipe face with washcloth      [facility] 6 Click ADL: 11    Treatment & Education:  Planned co-treatment with physical therapy for increased physical assist level. Alert and oriented x4. Guided patient to EOB with Maximal assist x2 persons. Sitting balance initially improved from previous session; progressive lateral lean with increased support needed throughout session; continuous verbal/tactile cueing to right self to midline. Stand by assist for grooming " activity as above. Two trials of sit to stand with maximal to total assist x2 persons. Return to bed. Sit to supine with maximal assist x2 persons. Dependent x2 persons for scoot to HOB. Left/Right rolling with maximal assist. PT exited room. Oral care for dentition management; removed bottom dentures to increase ability to communicate 2/2 dysarthria. Education provided to CNA for feeding strategy; stand by assist for feeding with verbal cueing to swallow.     Patient left HOB elevated with all lines intact, call button in reach, bed alarm on, nursing notified, and CNA present    GOALS:   Multidisciplinary Problems       Occupational Therapy Goals          Problem: Occupational Therapy    Goal Priority Disciplines Outcome Interventions   Occupational Therapy Goal     OT, PT/OT Ongoing, Progressing    Description: Goals to be met by: 7/3/2023     Patient will increase functional independence with ADLs by performing:    Self feeding with distant supervision in upright seated position with adaptations as needed (goal added 6/6/2023).  LE Dressing with Contact Guard Assistance with increased time. (downgrade goal to moderate assist 6/8/2023)  Toileting from toilet with Modified independence and increased time for hygiene and clothing management. (Downgrade goal to moderate assist 6/8/2023)  Step transfer with Stand-by Assistance and AD as needed. (Downgrade goal to moderate assist 6/8/2023)  Toilet transfer to toilet with Stand-by Assistance and AD as needed. (Downgrade goal to moderate assist to drop arm commode 6/8/2023)  Increase LUE proximal strength grossly to 3+/5.  100% reciprocation for patient education, CVA/BEFAST education, ADL/Mobility modifications, and visual scanning as appropriate.                          Time Tracking:     OT Date of Treatment: 06/08/23  OT Start Time: 1324  OT Stop Time: 1406  OT Total Time (min): 42 min; co-treat with physical therapy    Billable Minutes:Self Care/Home Management 12  min  Therapeutic Activity 30 min    OT/ARNALDO: OT          6/8/2023

## 2023-06-08 NOTE — AI DETERIORATION ALERT
Artificial Intelligence Notification  Julian      Admit Date: 2023  LOS: 2  Code Status: Full Code   Date of Consult: 2023  : 1945  Age: 78 y.o.  Weight:   Wt Readings from Last 1 Encounters:   23 99.3 kg (218 lb 14.7 oz)     Sex: male  Bed: 30/[facility] A:   MRN: [id number]  Attending Physician: Job Mendoza MD  Primary Service: [facility] HOSPITALIST TEAM B  Time AI Alert Received: ***  Time at Bedside: ***           ***      Vital Signs (Most Recent):  Temp: 99.4 °F (37.4 °C) (23)  Pulse: 92 (23)  Resp: 19 (23)  BP: (!) 144/90 (23)  SpO2: (!) 91 % (23)   Vital Signs (24h Range):  Temp:  [96.9 °F (36.1 °C)-99.4 °F (37.4 °C)] 99.4 °F (37.4 °C)  Pulse:  [] 92  Resp:  [18-20] 19  SpO2:  [90 %-98 %] 91 %  BP: (132-152)/(68-90) 144/90         This encounter was triggered by an Artificial Intelligence Notification.     Artificial Intelligence alert discussed with Primary team:  Name ***      Evaluation: ***    Disposition: ***

## 2023-06-08 NOTE — NURSING
Condom cath noted with approximately 50cc urine to bag, bladder scan resulted 0. Scanned multiple times with a result of 0. Dr mehreen pedroza, notified of all above written. No orders given. Stated thank you.

## 2023-06-08 NOTE — PT/OT/SLP PROGRESS
Speech Language Pathology Treatment    Patient Name:  Dejuan Correa   MRN:  [id number]  Admitting Diagnosis: Cerebellar stroke    Recommendations:                Recommendations:                General Recommendations: Dysphagia and dysarthria remediation; assess cognitive-linguistic skills as needed next session  Diet recommendations:  Puree, Puree Diet - IDDSI Level 4,   Nectar Thick, Mildly thick/Nectar thick liquids - IDDSI Level 2   Swallow Precautions:  1. UPRIGHT for all meals at 90 degree and for all PO solid intake   2. Pureed Tray/ NECTAR thick Liquids  3. Cup swallows preferred to regular volume   4. Alternate sips/bites  5. Eat  slowly  6. Assist for feeding set-up   7. One by one meds with sips of liquids   8. Close monitoring needed for any overt s/s of aspiration (coughing/choking, throat clearing, wet/gurgly vocal quality, nasal emission, watery eyes, spike in fever, reddened face, reduced O2 sats, etc)              No Jello, ice cream, popsicles, or frozen drinks!  In order to achieve NECTAR consistency, mix 1 packet of thickener with 4oz of liquids.  ALL liquids must be thickened, including broth and soups.  General Precautions: Standard, aspiration, fall, vision impaired, respiratory  Communication strategies:  dysarthria     Assessment:     Dejuan Correa is a 78 y.o. male with an SLP diagnosis of Dysphagia and Dysarthria. Continue ST POC.    Subjective     SLP communicated with RN this AM; pt awake, alert, and agreeable to ST. Pt stated 'I think I'm getting better.'    Pain/Comfort:  N/A    Respiratory Status: NC    Objective:     Has the patient been evaluated by SLP for swallowing?   Yes  Keep patient NPO? No     SWALLOWING     Pt with xeroemstomia and reported needing his dentures. SLP completed oral care with pt prior to PO intake this AM. SLP assisted pt with denture placement.   Pt consumed the following this date: 4 x 4oz cup sips nectar thick apple juice, 2 x 3oz nectar thick water, and  4 x 1/4 tspn apple sauce. Oral holding noted with all intake. Pt needed min-mod verbal and visual cues from SLP to initiate swallow. Swallow initiation appeared delayed upon finger palpation. Pt able to obtain adequate oral clearance with all intake. He was able to complete subsequent dry swallows x2 given verbal cueing. No overt s/s of aspiration with nectar thick liquids or tspn bites of puree today.    SLP reviewed MBSS findings from 23 as well as aspiration precautions and safe swallow recommendations. Pt stated agreement. After 5 min delay, pt recalled 2 aspiration precautions.       SPEECH    SLP provided pt with education re: clear speech strategies (SLOP) and pt was able to recall all strategies given min verbal cues t/o entire tx session     Pt completed oral motor exercises targeting lingual, labial, and buccal ROM and strength x20 each given min-mod visual and verbal cues    He needed min-mod verbal cues to utilize SLOP strategies to obtain intelligibility ~ 95% at the phrase and sentence levels x20. Mr. Correa verbalized name, age, , oriented x4, stated address, and named all of his children i'ly. Pt was able to utilize clear speech strategies in simple conversation with SLP given min-mod verbal cues toward end of session.    Goals:   Multidisciplinary Problems       SLP Goals          Problem: SLP    Goal Priority Disciplines Outcome   SLP Goal     SLP Ongoing, Progressing   Description: Short Term Goals:  1. Pt will participate in ongoing swallow assessment to determine least restrictive diet.   2. Pt will tolerate FULL liquid diet with no audible s/s of dysphagia and good oral clearance.   3. Pt will consume 75% of FULL Liquid without overt s/s of aspiration given min assist.   4. Pt will implement safe swallowing strategies 100% of the time given min assist.   5. Patient will successfully participate in wahghk-juirooqf-qicjrmvvc evaluation to further assess for any communication impairments  s/p stroke  6. Pt will tolerate advanced trials of PO with no outward s/s of dysphagia.   7. Pt will state personal info with min cues.  8. Pt will perform OMEx with mod cues to increase oral motor coordination for speech and swallowing.   9. Pt will complete written and reading tasks to determine further goals.                             Plan:     Patient to be seen:  3 x/week   Plan of Care expires:  07/04/23  Plan of Care reviewed with:  patient   SLP Follow-Up:  Yes       Discharge recommendations:   (high intensity level of therapy)     Time Tracking:     SLP Treatment Date:   06/08/23  Speech Start Time:  1015  Speech Stop Time:  1050     Speech Total Time (min):  35 min    Billable Minutes: Speech Therapy Individual 15 min, Treatment Swallowing Dysfunction 10 min, and Self Care/Home Management Training 10 min    06/08/2023

## 2023-06-08 NOTE — PROGRESS NOTES
CM  rec'd a call from pt's insurance - [employer]. They are offering a peer to peer before declining the case -- deadline today --  by 2:30 pm CST. They ask that MD  call  opt. 5 This is a direct line to the Med Director. Please give them pt's name, : ([id number]) and insurance ID #: [id number]   This info was given to the [facility] IM B team per Secure Chat.

## 2023-06-09 LAB
ALBUMIN SERPL BCP-MCNC: 2.8 G/DL (ref 3.5–5.2)
ALP SERPL-CCNC: 80 U/L (ref 55–135)
ALT SERPL W/O P-5'-P-CCNC: <5 U/L (ref 10–44)
ANION GAP SERPL CALC-SCNC: 12 MMOL/L (ref 8–16)
AST SERPL-CCNC: 14 U/L (ref 10–40)
BASOPHILS # BLD AUTO: 0.07 K/UL (ref 0–0.2)
BASOPHILS NFR BLD: 0.5 % (ref 0–1.9)
BILIRUB SERPL-MCNC: 1.7 MG/DL (ref 0.1–1)
BUN SERPL-MCNC: 23 MG/DL (ref 8–23)
CALCIUM SERPL-MCNC: 9.3 MG/DL (ref 8.7–10.5)
CHLORIDE SERPL-SCNC: 101 MMOL/L (ref 95–110)
CO2 SERPL-SCNC: 27 MMOL/L (ref 23–29)
CREAT SERPL-MCNC: 1.4 MG/DL (ref 0.5–1.4)
DIFFERENTIAL METHOD: ABNORMAL
EOSINOPHIL # BLD AUTO: 0.1 K/UL (ref 0–0.5)
EOSINOPHIL NFR BLD: 0.4 % (ref 0–8)
ERYTHROCYTE [DISTWIDTH] IN BLOOD BY AUTOMATED COUNT: 13.6 % (ref 11.5–14.5)
EST. GFR  (NO RACE VARIABLE): 51 ML/MIN/1.73 M^2
GLUCOSE SERPL-MCNC: 111 MG/DL (ref 70–110)
HCT VFR BLD AUTO: 36.9 % (ref 40–54)
HGB BLD-MCNC: 11.9 G/DL (ref 14–18)
IMM GRANULOCYTES # BLD AUTO: 0.04 K/UL (ref 0–0.04)
IMM GRANULOCYTES NFR BLD AUTO: 0.3 % (ref 0–0.5)
LYMPHOCYTES # BLD AUTO: 1.6 K/UL (ref 1–4.8)
LYMPHOCYTES NFR BLD: 11.4 % (ref 18–48)
MAGNESIUM SERPL-MCNC: 1.2 MG/DL (ref 1.6–2.6)
MCH RBC QN AUTO: 32.5 PG (ref 27–31)
MCHC RBC AUTO-ENTMCNC: 32.2 G/DL (ref 32–36)
MCV RBC AUTO: 101 FL (ref 82–98)
MONOCYTES # BLD AUTO: 1.1 K/UL (ref 0.3–1)
MONOCYTES NFR BLD: 8.1 % (ref 4–15)
NEUTROPHILS # BLD AUTO: 10.8 K/UL (ref 1.8–7.7)
NEUTROPHILS NFR BLD: 79.3 % (ref 38–73)
NRBC BLD-RTO: 0 /100 WBC
PLATELET # BLD AUTO: 243 K/UL (ref 150–450)
PMV BLD AUTO: 11 FL (ref 9.2–12.9)
POCT GLUCOSE: 111 MG/DL (ref 70–110)
POCT GLUCOSE: 120 MG/DL (ref 70–110)
POCT GLUCOSE: 137 MG/DL (ref 70–110)
POCT GLUCOSE: 137 MG/DL (ref 70–110)
POTASSIUM SERPL-SCNC: 3.4 MMOL/L (ref 3.5–5.1)
PROT SERPL-MCNC: 6.6 G/DL (ref 6–8.4)
RBC # BLD AUTO: 3.66 M/UL (ref 4.6–6.2)
SODIUM SERPL-SCNC: 140 MMOL/L (ref 136–145)
WBC # BLD AUTO: 13.61 K/UL (ref 3.9–12.7)

## 2023-06-09 PROCEDURE — 94761 N-INVAS EAR/PLS OXIMETRY MLT: CPT

## 2023-06-09 PROCEDURE — 11000001 HC ACUTE MED/SURG PRIVATE ROOM

## 2023-06-09 PROCEDURE — 93010 ELECTROCARDIOGRAM REPORT: CPT | Mod: ,,, | Performed by: INTERNAL MEDICINE

## 2023-06-09 PROCEDURE — 25000003 PHARM REV CODE 250

## 2023-06-09 PROCEDURE — 92526 ORAL FUNCTION THERAPY: CPT

## 2023-06-09 PROCEDURE — 97112 NEUROMUSCULAR REEDUCATION: CPT

## 2023-06-09 PROCEDURE — 80053 COMPREHEN METABOLIC PANEL: CPT | Performed by: STUDENT IN AN ORGANIZED HEALTH CARE EDUCATION/TRAINING PROGRAM

## 2023-06-09 PROCEDURE — 63700000 PHARM REV CODE 250 ALT 637 W/O HCPCS: Performed by: STUDENT IN AN ORGANIZED HEALTH CARE EDUCATION/TRAINING PROGRAM

## 2023-06-09 PROCEDURE — 25000003 PHARM REV CODE 250: Performed by: STUDENT IN AN ORGANIZED HEALTH CARE EDUCATION/TRAINING PROGRAM

## 2023-06-09 PROCEDURE — 63600175 PHARM REV CODE 636 W HCPCS: Performed by: STUDENT IN AN ORGANIZED HEALTH CARE EDUCATION/TRAINING PROGRAM

## 2023-06-09 PROCEDURE — 97112 NEUROMUSCULAR REEDUCATION: CPT | Mod: CQ

## 2023-06-09 PROCEDURE — 83735 ASSAY OF MAGNESIUM: CPT | Performed by: STUDENT IN AN ORGANIZED HEALTH CARE EDUCATION/TRAINING PROGRAM

## 2023-06-09 PROCEDURE — 97530 THERAPEUTIC ACTIVITIES: CPT

## 2023-06-09 PROCEDURE — 97530 THERAPEUTIC ACTIVITIES: CPT | Mod: CQ

## 2023-06-09 PROCEDURE — 93010 EKG 12-LEAD: ICD-10-PCS | Mod: ,,, | Performed by: INTERNAL MEDICINE

## 2023-06-09 PROCEDURE — 85025 COMPLETE CBC W/AUTO DIFF WBC: CPT | Performed by: STUDENT IN AN ORGANIZED HEALTH CARE EDUCATION/TRAINING PROGRAM

## 2023-06-09 PROCEDURE — 93005 ELECTROCARDIOGRAM TRACING: CPT

## 2023-06-09 RX ORDER — MAGNESIUM SULFATE HEPTAHYDRATE 40 MG/ML
2 INJECTION, SOLUTION INTRAVENOUS ONCE
Status: COMPLETED | OUTPATIENT
Start: 2023-06-09 | End: 2023-06-09

## 2023-06-09 RX ADMIN — POTASSIUM BICARBONATE 50 MEQ: 978 TABLET, EFFERVESCENT ORAL at 10:06

## 2023-06-09 RX ADMIN — ATORVASTATIN CALCIUM 40 MG: 40 TABLET, FILM COATED ORAL at 08:06

## 2023-06-09 RX ADMIN — MAGNESIUM SULFATE 2 G: 2 INJECTION INTRAVENOUS at 10:06

## 2023-06-09 RX ADMIN — AZITHROMYCIN MONOHYDRATE 500 MG: 250 TABLET ORAL at 02:06

## 2023-06-09 RX ADMIN — ACETAMINOPHEN 650 MG: 325 TABLET ORAL at 07:06

## 2023-06-09 RX ADMIN — CEFTRIAXONE SODIUM 2 G: 2 INJECTION, POWDER, FOR SOLUTION INTRAMUSCULAR; INTRAVENOUS at 02:06

## 2023-06-09 RX ADMIN — CARVEDILOL 12.5 MG: 12.5 TABLET, FILM COATED ORAL at 08:06

## 2023-06-09 RX ADMIN — HYDROCHLOROTHIAZIDE 12.5 MG: 12.5 TABLET ORAL at 08:06

## 2023-06-09 RX ADMIN — RIVAROXABAN 20 MG: 20 TABLET, FILM COATED ORAL at 04:06

## 2023-06-09 RX ADMIN — FOLIC ACID 1 MG: 1 TABLET ORAL at 08:06

## 2023-06-09 RX ADMIN — VALSARTAN 40 MG: 40 TABLET, FILM COATED ORAL at 08:06

## 2023-06-09 RX ADMIN — ASPIRIN 81 MG: 81 TABLET, COATED ORAL at 08:06

## 2023-06-09 NOTE — PROGRESS NOTES
Placement efforts continue - Claelizabeth with [facility] will reach out to pt's brother Usman - Daughter Alejandra  [phone];  Brother Usman      142/passr sent per [employer].

## 2023-06-09 NOTE — PT/OT/SLP PROGRESS
Speech Language Pathology Treatment    Patient Name:  Dejuan Correa   MRN:  [id number]  Admitting Diagnosis: Cerebellar stroke    Recommendations:                Recommendations:                General Recommendations: Dysphagia and dysarthria remediation; assess cognitive-linguistic skills as needed next session  Diet recommendations:  Puree, Puree Diet - IDDSI Level 4,   Nectar Thick, Mildly thick/Nectar thick liquids - IDDSI Level 2   Swallow Precautions:  1. UPRIGHT for all meals at 90 degree and for all PO solid intake   2. Pureed Tray/ NECTAR thick Liquids  3. Cup swallows preferred to regular volume   4. Alternate sips/bites  5. Eat  slowly  6. Assist for feeding set-up   7. One by one meds with sips of liquids   8. Close monitoring needed for any overt s/s of aspiration (coughing/choking, throat clearing, wet/gurgly vocal quality, nasal emission, watery eyes, spike in fever, reddened face, reduced O2 sats, etc)              No Jello, ice cream, popsicles, or frozen drinks!  In order to achieve NECTAR consistency, mix 1 packet of thickener with 4oz of liquids.  ALL liquids must be thickened, including broth and soups.  General Precautions: Standard, aspiration, fall, vision impaired, respiratory  Communication strategies:  dysarthria     Assessment:     Dejuan Correa is a 78 y.o. male with an SLP diagnosis of Dysphagia and Dysarthria.     Subjective     Patient was awake, alert and cooperative. His brother came at the end of the visit. Session was discontinued as patient wanted to visit with his brother.     Pain/Comfort:  Patient did not complain of pain    Respiratory Status: NC    Objective:     Has the patient been evaluated by SLP for swallowing?   Yes  Keep patient NPO? No     SWALLOWING     Pt recently completed his lunch tray and only wanted limited po intake. He was seen for po trials of thin liquids via regulated single cup rim sips. Patient was cued for fast and effortful swallow with each sip  and without cues with each sip, he was not able to carry over strategy. No anterior spillage and he was able to clear the oral cavity. No overt signs and symptoms of aspiration after the swallow. Patient did have complaints about the puree diet, but after education regarding reason for his current diet, he verbalized understanding. Also educated patient regarding current diet recommendations and strategies. He verbalized understanding, but recommend to continue to educate and reinforce.     SPEECH    SLP provided pt with education re: clear speech strategies including a slower rate of speech, increased vocal intensity, and overarticulation. Initially, patient was not able to recall strategies, but as SLP began to discuss them, he reported he did remember someone going over them with him. He did require minimal cues throughout the session to utilize strategies. During the session, his intelligibility varied. At times, his speech intelligibility in conversation was  ~90%, but other times, it was ~50% and he required multiple repetitions of the utterance utilizing strategies to improve his intelligibility.      Pt completed lingual, labial and buccal ROM and strength oral motor exercises  x20 each given min-mod visual/verbal cues.     Goals:   Multidisciplinary Problems       SLP Goals          Problem: SLP    Goal Priority Disciplines Outcome   SLP Goal     SLP Ongoing, Progressing   Description: Short Term Goals:  1. Pt will participate in ongoing swallow assessment to determine least restrictive diet.   2. Pt will tolerate FULL liquid diet with no audible s/s of dysphagia and good oral clearance.   3. Pt will consume 75% of FULL Liquid without overt s/s of aspiration given min assist.   4. Pt will implement safe swallowing strategies 100% of the time given min assist.   5. Patient will successfully participate in xwkmjm-wjxsgbik-bkugaccca evaluation to further assess for any communication impairments s/p stroke  6.  Pt will tolerate advanced trials of PO with no outward s/s of dysphagia.   7. Pt will state personal info with min cues.  8. Pt will perform OMEx with mod cues to increase oral motor coordination for speech and swallowing.   9. Pt will complete written and reading tasks to determine further goals.                             Plan:     Patient to be seen:  3 x/week   Plan of Care expires:  07/04/23  Plan of Care reviewed with:  patient   SLP Follow-Up:  Yes       Discharge recommendations:   (high intensity level of therapy)     Time Tracking:     SLP Treatment Date:   06/09/23  Speech Start Time:  1300  Speech Stop Time:  1316     Speech Total Time (min):  16 min    Billable Minutes: Treatment Swallowing Dysfunction 16 min    06/09/2023

## 2023-06-09 NOTE — PROGRESS NOTES
[facility] Medicine Progress Note    Primary Team: hospitals Hospitalist Team B  Attending Physician: Reji  Resident: Lisa/Billy  [facility]    [facility] Day: 5    Subjective:      Interval: NAEON per nursing.    This AM, patient resting comfortably.  No complaints at this time. Denies f/c/n/v, sob, headache, cp.       Objective:   Last 24 Hour Vital Signs:  BP  Min: 126/70  Max: 158/86  Temp  Av.9 °F (36.6 °C)  Min: 97.3 °F (36.3 °C)  Max: 98.2 °F (36.8 °C)  Pulse  Av.9  Min: 79  Max: 133  Resp  Av.1  Min: 16  Max: 18  SpO2  Av.6 %  Min: 94 %  Max: 100 %    Intake/Output Summary (Last 24 hours) at 2023 0724  Last data filed at 2023 0709  Gross per 24 hour   Intake --   Output 900 ml   Net -900 ml        Physical Examination:  Physical Exam  Vitals and nursing note reviewed.   Constitutional:       Appearance: Normal appearance.   HENT:      Head: Normocephalic and atraumatic.      Right Ear: External ear normal.      Left Ear: External ear normal.      Nose: Nose normal. No congestion or rhinorrhea.      Mouth/Throat:      Mouth: Mucous membranes are moist.      Pharynx: Oropharynx is clear.   Eyes:      Pupils: Pupils are equal, round, and reactive to light.   Cardiovascular:      Rate and Rhythm: Normal rate and regular rhythm.   Pulmonary:      Effort: Pulmonary effort is normal.      Breath sounds: Normal breath sounds. No wheezing or rales.   Abdominal:      General: Abdomen is flat. Bowel sounds are normal.      Palpations: Abdomen is soft.   Musculoskeletal:      Right lower leg: No edema.      Left lower leg: No edema.   Skin:     Capillary Refill: Capillary refill takes less than 2 seconds.      Findings: No erythema or rash.   Neurological:      Mental Status: He is alert.      Comments: See detailed neuro exam below   Psychiatric:         Mood and Affect: Mood normal.         Behavior: Behavior normal.        NEUROLOGICAL    MENTAL STATUS:  AAOx3  LANGUAGE/SPEECH: Naming and repetition intact, some fluency issues, follows 3-step commands    CRANIAL NERVES:  II: Pupils equal and reactive, no RAPD, no VF deficits  III, IV, VI: EOM intact, slow lateral movements past midline with L gaze bilaterally, mild R gaze preference, no nystagmus.  V: normal sensation in V1, V2, and V3 segments bilaterally  VII: no asymmetry, mild L nasolabial fold flattening  VIII: normal hearing to speech  IX, X: normal palatal elevation, no uvular deviation  XI: 5/5 head turn and 5/5 shoulder shrug bilaterally  XII: midline tongue protrusion    MOTOR:    Muscle bulk and tone are normal.           Deltoid (C5) Bicep (C5-C6) Tricep (C6-C7) Wrist Extensor (C6-C7) Finger Flexion (C8) Finger Abduction (T1) Thumb Opposition (C8-T1)   Left 4 4 4 4 4 5 4   Right 5 5 5 5 5 5 5              Hip Flexion (L2-L3) Hip Extension (L4-L5) Hip Adduction (L2,L3,L4) Hip Abduction (L4, L5, S1) Knee Extension (L3-L4) Knee Flexion (L5-S1) Ankle Dorsiflexion (L4-L5) Ankle Plantarflexion (S1-S2) EHL (L5)   Left 4 4 4 5 5 5 5 5 5   Right 5 5 5 5 5 5 5 5 5         Deep Tendon Reflexes:          Bicep (C5-C6) Brachioradialis   (C5-C6) Tricep   (C7-C8) Patellar   (L2-L3) Achilles   (S1) Gambino Babinski   Left 3+ 3+ 3+ 3+ 2+ - -   Right 3+ 3+ 3+ 2+ 2+ - -         Sensory: Light touch, position sense are intact in fingers and toes.     Coordination:  There is no dysmetria on finger-to-nose and heel-knee-shin. There are no abnormal or extraneous movements.  Resting tremor of L hand present     Gait/Stance:  Posture is normal                    NIH Stroke Scale       1a. Level of consciousness 0=alert; keenly responsive   1b. LOC questions 0=Answers both tasks correctly   1c. LOC commands 0=Answers both tasks correctly   2. Best gaze 0=normal   3. Visual 0=No visual loss   4.  Facial palsy 1=Minor paralysis (flattened nasolabial fold, asymmetric on smiling)   5.  Motor left arm 1=Drift, limb holds 90 (or 45)  degrees but drifts down before full 10 seconds: does not hit bed   5b. Motor right arm 0=No drift, limb holds 90 (or 45) degrees for full 10 seconds   6a. Motor left leg 1=Drift, limb holds 90 (or 45) degrees but drifts down before full 10 seconds: does not hit bed   6b. Motor right leg 0=No drift, limb holds 90 (or 45) degrees for full 10 seconds   7. Limb ataxia 1=Present in 1-limb   8. Sensory 1=Mild to moderate sensory loss; patient feels pinprick is less sharp or is dull on the affected side; there is a loss of superficial pain with pinprick but patient is aware He is being touched   9. Best language 1=Mild to moderate aphasia; some obvious loss of fluency or facility of comprehension without significant limitation on ideas expressed or form of expression.   10. Dysarthria 1=Mild to moderate, patient slurs at least some words and at worst, can be understood with some difficulty   11.  Extinction and inattention 0=No abnormality                                    TOTAL: 7         Laboratory:        Recent Labs   Lab 06/04/23  1635 06/05/23 0513 06/05/23 0514   WBC 10.06  --  8.95   HGB 13.5*  --  13.4*   HCT 41.6  --  41.1     --  267   *  --  102*   RDW 13.5  --  13.4    139  --    K 3.9 3.7  --     103  --    CO2 26 21*  --    BUN 14 13  --    CREATININE 1.2 1.1  --    * 108  --    PROT 6.7 7.3  --    ALBUMIN 3.4* 3.3*  --    BILITOT 1.2* 1.3*  --    AST 16 16  --    ALKPHOS 103 102  --    ALT 6* 6*  --         Laboratory:  Recent Labs   Lab 06/04/23  1635 06/05/23  0513 06/05/23  0514 06/06/23  0439 06/08/23  0346   WBC 10.06  --  8.95 9.58 16.83*   HGB 13.5*  --  13.4* 13.0* 12.5*   HCT 41.6  --  41.1 40.6 38.9*     --  267 245 264   *  --  102* 102* 102*   RDW 13.5  --  13.4 13.4 13.5    139  --  139 143   K 3.9 3.7  --  4.1 3.9    103  --  102 101   CO2 26 21*  --  23 27   BUN 14 13  --  13 22   CREATININE 1.2 1.1  --  1.1 1.3   * 108  --   88 111*   PROT 6.7 7.3  --  6.9 7.2   ALBUMIN 3.4* 3.3*  --  3.2* 3.2*   BILITOT 1.2* 1.3*  --  1.6* 1.8*   AST 16 16  --  17 18   ALKPHOS 103 102  --  95 99   ALT 6* 6*  --  7* <5*       I have personally reviewed the above laboratory studies.     Imaging:  EKG (my interpretation): No new today    X-Ray Chest AP Portable   Final Result      Slight obscuration of the left diaphragmatic border when compared to radiograph dated 05/26/2023, possibly related to adjacent atelectasis or developing consolidation.  Follow-up radiograph recommended.         Electronically signed by: Juan Mnauel Barnes MD   Date:    06/08/2023   Time:    08:25      US Carotid Bilateral   Final Result      No evidence of a hemodynamically significant carotid bifurcation stenosis.         Electronically signed by: Hipolito Paul Jr   Date:    06/07/2023   Time:    15:02      Fl Modified Barium Swallow Speech   Final Result      As above.  Please see documentation from the speech pathologist for additional findings and recommendations.         Electronically signed by: Enmanuel Bell   Date:    06/06/2023   Time:    14:26      CTA Head and Neck (xpd)   Final Result      1. Unremarkable CTA head and neck.  No large vessel occlusion or high-grade stenosis.   2. Atherosclerotic disease of the bilateral carotid bifurcations, and the bilateral intracranial ICAs and vertebral arteries as above.   3. Known acute infarctions better evaluated on recently performed MRI.   4. Senescent changes and chronic microvascular ischemic changes.   5. Pulmonary artery dilation which can be seen with pulmonary hypertension.         Electronically signed by: Gurdeep Cho   Date:    06/05/2023   Time:    18:39      MRI Brain Without Contrast   Final Result   Abnormal      1. Multiple small acute infarctions in the left cerebellar hemisphere and in the right corona radiata.   2. Senescent changes and chronic microvascular ischemic changes.   This report was flagged in  Epic as abnormal.      Dr. Gurdeep Cho discussed critical findings with Dr. Nyasia Cho by telephone at 19:04 on 06/04/2023.         Electronically signed by: Gurdeep Cho   Date:    06/04/2023   Time:    19:07      CT Head Without Contrast   Final Result      Small hypodensity in the left cerebellar hemisphere compatible with an age-indeterminate lacunar infarction.  Otherwise no acute intracranial abnormality.  Further evaluation with MRI as clinically indicated.         Electronically signed by: Gurdeep Cho   Date:    06/04/2023   Time:    17:06          Current Medications:  Scheduled:   aspirin  81 mg Oral Daily    atorvastatin  40 mg Oral Daily    azithromycin  500 mg Oral Daily    carvediloL  12.5 mg Oral Daily    cefTRIAXone (ROCEPHIN) IVPB  2 g Intravenous Q24H    cyanocobalamin  1,000 mcg Intramuscular Weekly    folic acid  1 mg Oral Daily    hydroCHLOROthiazide  12.5 mg Oral Daily    rivaroxaban  20 mg Oral Daily with dinner    valsartan  40 mg Oral Daily     Infusions:    PRN:  acetaminophen, pneumoc 20-richmond conj-dip cr(PF), sodium chloride 0.9%    Assessment:     Dejuan Correa is a 78 y.o.  male who has a past medical history of paroxysmal Atrial fibrillation, Supraventricular tachycardia, HTN, HLD, and alcohol use disorder with hx of complicated withdrawal. The patient presented to [facility] on 6/4/2023 with a primary complaint of slurred speech and left sided weakness the afternoon of admission, found to have multiple small acute L cerebellar and R corona radiata infarcts on MRI brain. Neurology consulted, appreciate recs. Xarelto restarted. Therapies recomending IPR for patient. New leukocytosis on AM labs 6/8/23. CXR with possible consolidation so antibiotics initiated. UA negative.    Plan:     Acute cerebellar CVA  - slurred speech + mild L sided weakness, L pronator drift in patient on Xarelto for pAfib  - NIH scale:  - not TPA candidate, on xarelto for P-afib  - CT  head: age indeterminate hypodensity L cerebellum  - MRI brain: multiple small acute infarctions in the left cerebellar hemisphere and in the right corona radiata.  - multifocal CVA suggests embolic etiology- has rate controlled P-afib adherent to Xarelto  - TIA ABCD2 score = 6  - CTA without LVO or high grade stenosis; atherosclerotic disease of carotids bilaterally   - L-Neurology on board  - A1c 5.2, TSH wnl, B12 263, Folate 3.2  - Lipid panel with HDL 31, LDL 68  - Xarelto restarted per neuro recs on 6/6/23  - Carotid US w/o significant stenosis   Plan:  - Neurology recs  - Frequent neuro-checks (q4h)  - Head of bed > 30 degrees for aspiration prevention and aspiration precautions ordered.  - Continue Atorvastatin 40 mg daily (long-term goal LDL < 70)  - Continue ASA 81 daily  - Continue Xarelto 20mg daily  - Stroke education and counseling  - Physical therapy, occupational therapy, speech therapy   - Recommend IPR   - Full pureed diet (IDDSI Level 4) with Nectar thick fluids    Leukocytosis  - WBC 16.83 on 06/8/23-->13.61  - Afebrile, benign physical exam  - CXR with possible consolidation  - UA negative  - Ceftriaxone and azithromycin initiated   Plan:  - Continue abx with transition to PO w/ discharge     Paroxysmal A-fib  - reported adherent to coreg and xarelto, multifocal CVA raises concern for thromboembolic source  - NS with normal rate since admission   - resumed xarelto 6/6/23 per neuro recs  Plan:  - Monitor on telemetry  - Continue coreg  - Continue Xarelto 20mg daily     HTN   - home meds: coreg 12.5 daily, HCTZ 12.5, valsartan 40   Plan:  - Continue home medications     HLD  - not on statin  - Lipid panel with HDL 31, LDL 68  Plan:  -Atorvastatin 40 mg daily (long-term goal LDL < 70)     Severe alcohol use disorder in early remission with hx complicated withdrawal  - reports no alcohol use since hospital discharge (last drink >7 days)  - ethanol <10 on admission  - no clinical evidence of acute  withdrawal at this time  Plan:  - monitor on tele, CIWA Q6     Macrocytic anemia  - mild anemia, MCV>100, likely 2/2 alcohol use   - B12 and folate deficient  Plan:  - B12 1000mcg IM weekly for 4-weeks  - Folate 1mg PO daily      Diet: Diet Dysphagia Pureed (IDDSI Level 4) [facility]; Standard Tray; Nectar Thick   DVT Prophylaxis: SCDs until neuro eval for LMWH  Code: Full Code     Social: daughter vineet updated over phone, brother updated at bedside  Dispo: Pending IPR acceptance    Jimbo Dean MD  [facility] Neu[facility] PGY-1      [facility] Medicine Hospitalist Pager numbers:   [facility] Hospitalist Medicine Team A (Anila/Priscilla): [phone]  [facility] Hospitalist Medicine Team B (Reji/Samina):  [phone]

## 2023-06-09 NOTE — PLAN OF CARE
Problem: Occupational Therapy  Goal: Occupational Therapy Goal  Description: Goals to be met by: 7/3/2023     Patient will increase functional independence with ADLs by performing:    Self feeding with distant supervision in upright seated position with adaptations as needed (goal added 6/6/2023).  LE Dressing with Contact Guard Assistance with increased time. (downgrade goal to moderate assist 6/8/2023)  Toileting from toilet with Modified independence and increased time for hygiene and clothing management. (Downgrade goal to moderate assist 6/8/2023)  Step transfer with Stand-by Assistance and AD as needed. (Downgrade goal to moderate assist 6/8/2023)  Toilet transfer to toilet with Stand-by Assistance and AD as needed. (Downgrade goal to moderate assist to drop arm commode 6/8/2023)  Increase LUE proximal strength grossly to 3+/5.  100% reciprocation for patient education, CVA/BEFAST education, ADL/Mobility modifications, and visual scanning as appropriate.     Outcome: Ongoing, Progressing

## 2023-06-09 NOTE — PLAN OF CARE
Problem: Physical Therapy  Goal: Physical Therapy Goal  Description: Goals to be met by: 2023     Patient will increase functional independence with mobility by performin. Supine to sit with MInimal Assistance  2. Sit to supine with MInimal Assistance  3. Rolling to Left and Right with Minimal Assistance.  4. Sit to stand transfer with Minimal Assistance  5. Bed to chair transfer with Minimal Assistance using Rolling Walker  6. Gait  x 10 feet with Maximum Assistance using LRAD.  7. Stand for 3 minutes with Moderate Assistance using LRAD to perform a functional activity.      Outcome: Ongoing, Progressing   Continue working toward goals

## 2023-06-09 NOTE — PT/OT/SLP PROGRESS
Physical Therapy Treatment    Patient Name:  Dejuan Correa   MRN:  [id number]    Recommendations:     Discharge Recommendations:  (high intensity therapy)  Discharge Equipment Recommendations:  (TBD)  Barriers to discharge:  decreased functional mobility/strength    Assessment:     Dejuan Correa is a 78 y.o. male admitted with a medical diagnosis of Cerebellar stroke.  He presents with the following impairments/functional limitations: weakness, impaired endurance, impaired functional mobility, gait instability, impaired self care skills, impaired balance, decreased coordination, impaired coordination, decreased upper extremity function, decreased lower extremity function, decreased safety awareness, pain, abnormal tone, decreased ROM, impaired fine motor Pt would continue to benefit from P.T. To address impairments listed above, and to assist pt's returned to PLOF.    .    Rehab Prognosis: Fair; patient would benefit from acute skilled PT services to address these deficits and reach maximum level of function.    Recent Surgery: * No surgery found *      Plan:     During this hospitalization, patient to be seen 6 x/week to address the identified rehab impairments via gait training, therapeutic activities, therapeutic exercises, neuromuscular re-education and progress toward the following goals:    Plan of Care Expires:  07/05/23    Subjective       Patient/Family Comments/goals: Pt agreed to tx.  Pain/Comfort:  Pain Rating 1:  (did not rate)  Location - Side 1: Left  Location - Orientation 1: generalized  Location 1: knee  Pain Addressed 1: Reposition, Distraction, Cessation of Activity  Pain Rating Post-Intervention 1:  (as above)      Objective:     Communicated with RN prior to session.  Patient found supine with bed alarm, peripheral IV, telemetry upon PT entry to room.     General Precautions: Standard, aspiration, fall  Orthopedic Precautions: N/A  Braces: N/A  Respiratory Status: Room air     Functional  Mobility:  Bed Mobility:     Rolling Left:  moderate assistance  Scooting: minimum assistance up to HOB with pt pushing with RLE and pull up to HOB using BUEs on headboard.  Pt required Jason to maintain L  on headboard.  2 scoots  Supine to Sit: maximal assistance.  Assist for BLEs off EOB and less assist at trunk with increased time for pt to perform and vc's for sequencing  Sit to Supine: moderate assistance and NDT/segmental tech used, pt held onto side rail, lowered trunk with L elbow with CGa, assist for LLE onto bed with step by step cues fotr tech    Transfers:     Sit to Stand:  First two trials with RW and Mod A of 2 and 3rd trial without A.D. and Max A of 2 with PTA in front of pt and OT in back of pt.  Balance: sitting fair-/fair+, standing poor      AM-PAC 6 CLICK MOBILITY  Turning over in bed (including adjusting bedclothes, sheets and blankets)?: 2  Sitting down on and standing up from a chair with arms (e.g., wheelchair, bedside commode, etc.): 2  Moving from lying on back to sitting on the side of the bed?: 2  Moving to and from a bed to a chair (including a wheelchair)?: 2  Need to walk in hospital room?: 1  Climbing 3-5 steps with a railing?: 1  Basic Mobility Total Score: 10       Treatment & Education:  Increased time spent working with pt on midline orientation in sitting and standing using NDT.  Pt initially sat EOB with a left lean/push.  Pt given vc's/tc's with OT in front for mirror image visual cues to assist with midline alignment/orientation while PTA providing w/chula through left LUE and tapping at tricep to assist with pt positioning himself to midline with his right hand on his right knee to decreased push to left.  Pt was able to follow the cues and obtain midline, but had difficulty maintaining for more than a few seconds and returned to left lean; however, less than he initially demonstrated.  Lateral bend onto right forearm x 3 reps with pt performing reaching acts with gravity  with LUE to the right with CGA/ occasional Jason, another time with gentle rocking, and last time with gentle stretch to left lateral trunk and gentle rocking.  Each time pt returned to midline with CGA and improved midline orientation.      Static standing with Rw twice and Max A of 2.  Pt stood pushing left with PTA positioning pt's right foot under his hip prior to standing and blocked RLE laterally to prevent R foot from sliding out to the right and contributing to pushing left.  OT blocked pts left knee into extension as able secondary to increased flexion.  Both therapy assisted with midline standing posture with pt able to follow vc's to bring his shoulders to the left to assist with achieving.  Pt, however, stands with flexed trunk, hips, and left knee.  Pt was unable to extend trunk/hips to achieve upright posture with assistance.  Seated rest between bouts.    3rd trial of standing without A.D. and Max A of 2 with PTA in front of pt and OT behind pt.  PTA blocking pt's left knee into extension and bracing RLE laterally to prevent right foot from sliding to the right and increase push to the left.  Pt's UEs were placed on PTA's shoulders to promote upright posture and decreased push to the left with RUE while OT facilitated trunk/hip extension for upright posture. Therapists facilitated rocking right to midline to assist with decreasing lean/push to the left.  VC's for midline with assist, but pt has difficulty maintaining.     Left knee flexion/extension in sitting with PTA assisting and performing gentle hamstring stretch.  Decreased strength and ROM.    Patient left HOB elevated with all lines intact, call button in reach, bed alarm on, and RN notified..    GOALS:   Multidisciplinary Problems       Physical Therapy Goals          Problem: Physical Therapy    Goal Priority Disciplines Outcome Goal Variances Interventions   Physical Therapy Goal     PT, PT/OT Ongoing, Progressing     Description: Goals to  be met by: 2023     Patient will increase functional independence with mobility by performin. Supine to sit with MInimal Assistance  2. Sit to supine with MInimal Assistance  3. Rolling to Left and Right with Minimal Assistance.  4. Sit to stand transfer with Minimal Assistance  5. Bed to chair transfer with Minimal Assistance using Rolling Walker  6. Gait  x 10 feet with Maximum Assistance using LRAD.  7. Stand for 3 minutes with Moderate Assistance using LRAD to perform a functional activity.                           Time Tracking:     PT Received On: 23  PT Start Time: 1506     PT Stop Time: 1604  PT Total Time (min): 58 min     Billable Minutes: Therapeutic Activity 15 and Neuromuscular Re-education 43    Treatment Type: Treatment  PT/PTA: PTA     Number of PTA visits since last PT visit: 3     2023

## 2023-06-09 NOTE — PLAN OF CARE
Problem: SLP  Goal: SLP Goal  Description: Short Term Goals:  1. Pt will participate in ongoing swallow assessment to determine least restrictive diet.   2. Pt will tolerate FULL liquid diet with no audible s/s of dysphagia and good oral clearance.   3. Pt will consume 75% of FULL Liquid without overt s/s of aspiration given min assist.   4. Pt will implement safe swallowing strategies 100% of the time given min assist.   5. Patient will successfully participate in lsuhmq-[id number]-scasiapch evaluation to further assess for any communication impairments s/p stroke  6. Pt will tolerate advanced trials of PO with no outward s/s of dysphagia.   7. Pt will state personal info with min cues.  8. Pt will perform OMEx with mod cues to increase oral motor coordination for speech and swallowing.   9. Pt will complete written and reading tasks to determine further goals.          Outcome: Ongoing, Progressing

## 2023-06-09 NOTE — PROGRESS NOTES
Updated [employer] and requested A O R form faxed to [facility]   P ;   f  [phone]   Ref # [id number]   CM rec'd confirmation email - info successfully faxed.

## 2023-06-09 NOTE — PROGRESS NOTES
"CM rec'd message from [facility] Intake Nurse:  "checked with [facility]  and verified that because Denial was issued yesterday with P2P due to change in medical condition, Hospital has to request an appeal to move forward with request for IPR. For appeal: [phone]; Qyn-[phone]. Please call me if any questions. "      will set up request for appeal.       ---------------------------11:25am   Hospital Appeal for Services ([facility]) has been called in. ref # [id number]. Per Magda, p# -[phone] -- An Expedited Appeal has been filed -- THIS SHOULD BE DETERMINED WITHIN 72 HOURS -- likely less than that. Magda was given [facility] intake nurse's phone # (Amira) as well as CM's phone #.     -------------------------1:00 pm   CM rec'd a call from Magali with [facility]  -- she did not give CM a direct phone #  -- as CM is representative of MD - a form must be filled out by CM and submitted  -- form:  A O R   ---  she is faxing this form to .     --------------------------2:01pm   CM never rec'd from from [facility] - Amira notified - she will ask he insurance dept to work with [facility]    "

## 2023-06-09 NOTE — PLAN OF CARE
[facility] Plan of Care    Notified by nurse of patient's HR in 130s.  He is asymptomatic.  BP wnl.  EKG ordered.  Went to tele.  Per tele monitor his HR has been mostly low 100s only rarely getting into 130s briefly.  Will continue to monitor.    Raudel Arguelles MD  [facility], -II

## 2023-06-10 LAB
ALBUMIN SERPL BCP-MCNC: 2.8 G/DL (ref 3.5–5.2)
ALP SERPL-CCNC: 85 U/L (ref 55–135)
ALT SERPL W/O P-5'-P-CCNC: <5 U/L (ref 10–44)
ANION GAP SERPL CALC-SCNC: 13 MMOL/L (ref 8–16)
AST SERPL-CCNC: 16 U/L (ref 10–40)
BILIRUB SERPL-MCNC: 1.2 MG/DL (ref 0.1–1)
BUN SERPL-MCNC: 21 MG/DL (ref 8–23)
CALCIUM SERPL-MCNC: 9.6 MG/DL (ref 8.7–10.5)
CHLORIDE SERPL-SCNC: 102 MMOL/L (ref 95–110)
CO2 SERPL-SCNC: 26 MMOL/L (ref 23–29)
CREAT SERPL-MCNC: 1.1 MG/DL (ref 0.5–1.4)
EST. GFR  (NO RACE VARIABLE): >60 ML/MIN/1.73 M^2
GLUCOSE SERPL-MCNC: 107 MG/DL (ref 70–110)
POCT GLUCOSE: 117 MG/DL (ref 70–110)
POCT GLUCOSE: 127 MG/DL (ref 70–110)
POCT GLUCOSE: 130 MG/DL (ref 70–110)
POCT GLUCOSE: 139 MG/DL (ref 70–110)
POTASSIUM SERPL-SCNC: 3.9 MMOL/L (ref 3.5–5.1)
PROT SERPL-MCNC: 6.7 G/DL (ref 6–8.4)
SODIUM SERPL-SCNC: 141 MMOL/L (ref 136–145)

## 2023-06-10 PROCEDURE — 25000003 PHARM REV CODE 250: Performed by: STUDENT IN AN ORGANIZED HEALTH CARE EDUCATION/TRAINING PROGRAM

## 2023-06-10 PROCEDURE — 63700000 PHARM REV CODE 250 ALT 637 W/O HCPCS: Performed by: STUDENT IN AN ORGANIZED HEALTH CARE EDUCATION/TRAINING PROGRAM

## 2023-06-10 PROCEDURE — 25000003 PHARM REV CODE 250

## 2023-06-10 PROCEDURE — 97530 THERAPEUTIC ACTIVITIES: CPT | Mod: CQ

## 2023-06-10 PROCEDURE — 36415 COLL VENOUS BLD VENIPUNCTURE: CPT | Performed by: STUDENT IN AN ORGANIZED HEALTH CARE EDUCATION/TRAINING PROGRAM

## 2023-06-10 PROCEDURE — 11000001 HC ACUTE MED/SURG PRIVATE ROOM

## 2023-06-10 PROCEDURE — 63600175 PHARM REV CODE 636 W HCPCS: Performed by: STUDENT IN AN ORGANIZED HEALTH CARE EDUCATION/TRAINING PROGRAM

## 2023-06-10 PROCEDURE — 97112 NEUROMUSCULAR REEDUCATION: CPT | Mod: CQ

## 2023-06-10 PROCEDURE — 80053 COMPREHEN METABOLIC PANEL: CPT | Performed by: STUDENT IN AN ORGANIZED HEALTH CARE EDUCATION/TRAINING PROGRAM

## 2023-06-10 PROCEDURE — 94761 N-INVAS EAR/PLS OXIMETRY MLT: CPT

## 2023-06-10 RX ADMIN — CEFTRIAXONE SODIUM 2 G: 2 INJECTION, POWDER, FOR SOLUTION INTRAMUSCULAR; INTRAVENOUS at 01:06

## 2023-06-10 RX ADMIN — ACETAMINOPHEN 650 MG: 325 TABLET ORAL at 09:06

## 2023-06-10 RX ADMIN — RIVAROXABAN 20 MG: 20 TABLET, FILM COATED ORAL at 04:06

## 2023-06-10 RX ADMIN — ASPIRIN 81 MG: 81 TABLET, COATED ORAL at 08:06

## 2023-06-10 RX ADMIN — FOLIC ACID 1 MG: 1 TABLET ORAL at 08:06

## 2023-06-10 RX ADMIN — VALSARTAN 40 MG: 40 TABLET, FILM COATED ORAL at 08:06

## 2023-06-10 RX ADMIN — CARVEDILOL 12.5 MG: 12.5 TABLET, FILM COATED ORAL at 08:06

## 2023-06-10 RX ADMIN — ACETAMINOPHEN 650 MG: 325 TABLET ORAL at 08:06

## 2023-06-10 RX ADMIN — AZITHROMYCIN MONOHYDRATE 500 MG: 250 TABLET ORAL at 08:06

## 2023-06-10 RX ADMIN — HYDROCHLOROTHIAZIDE 12.5 MG: 12.5 TABLET ORAL at 08:06

## 2023-06-10 RX ADMIN — ATORVASTATIN CALCIUM 40 MG: 40 TABLET, FILM COATED ORAL at 08:06

## 2023-06-10 NOTE — PROGRESS NOTES
[facility] Medicine Progress Note    Primary Team: [facility] Hospitalist Team B  Attending Physician: Reji  Resident: Lisa/Billy  [facility]    [facility] Day: 5    Subjective:      This am Mr. Correa is in good spirits. Reports hes noticed some improvement with PT/OT, is able to eat soft foods. Speech is intelligible. No fevers, coughing, SOB.     Objective:   Last 24 Hour Vital Signs:  BP  Min: 130/91  Max: 148/67  Temp  Av.7 °F (36.5 °C)  Min: 97.1 °F (36.2 °C)  Max: 98.4 °F (36.9 °C)  Pulse  Av.9  Min: 80  Max: 95  Resp  Av  Min: 16  Max: 18  SpO2  Av.4 %  Min: 94 %  Max: 98 %    Intake/Output Summary (Last 24 hours) at 6/10/2023 0731  Last data filed at 2023 1712  Gross per 24 hour   Intake 425 ml   Output --   Net 425 ml        Physical Examination:  Physical Exam  Vitals and nursing note reviewed.   Constitutional:       Appearance: Normal appearance.   HENT:      Head: Normocephalic and atraumatic.      Right Ear: External ear normal.      Left Ear: External ear normal.      Nose: Nose normal. No congestion or rhinorrhea.      Mouth/Throat:      Mouth: Mucous membranes are moist.      Pharynx: Oropharynx is clear.   Eyes:      Pupils: Pupils are equal, round, and reactive to light.   Cardiovascular:      Rate and Rhythm: Normal rate and regular rhythm.   Pulmonary:      Effort: Pulmonary effort is normal.      Breath sounds: Normal breath sounds. No wheezing or rales.   Abdominal:      General: Abdomen is flat. Bowel sounds are normal.      Palpations: Abdomen is soft.   Musculoskeletal:      Right lower leg: No edema.      Left lower leg: No edema.   Skin:     Capillary Refill: Capillary refill takes less than 2 seconds.      Findings: No erythema or rash.   Neurological:      Mental Status: He is alert.      Comments: See detailed neuro exam below   Psychiatric:         Mood and Affect: Mood normal.         Behavior: Behavior normal.         Laboratory:  Recent  Labs   Lab 06/04/23  1635 06/05/23  0513 06/05/23  0514 06/06/23  0439 06/08/23  0346 06/09/23  0732 06/09/23  0733 06/10/23  0508   WBC 10.06  --  8.95 9.58 16.83* 13.61*  --   --    HGB 13.5*  --  13.4* 13.0* 12.5* 11.9*  --   --    HCT 41.6  --  41.1 40.6 38.9* 36.9*  --   --      --  267 245 264 243  --   --    *  --  102* 102* 102* 101*  --   --    RDW 13.5  --  13.4 13.4 13.5 13.6  --   --     139  --  139 143  --  140 141   K 3.9 3.7  --  4.1 3.9  --  3.4* 3.9    103  --  102 101  --  101 102   CO2 26 21*  --  23 27  --  27 26   BUN 14 13  --  13 22  --  23 21   CREATININE 1.2 1.1  --  1.1 1.3  --  1.4 1.1   * 108  --  88 111*  --  111* 107   PROT 6.7 7.3  --  6.9 7.2  --  6.6 6.7   ALBUMIN 3.4* 3.3*  --  3.2* 3.2*  --  2.8* 2.8*   BILITOT 1.2* 1.3*  --  1.6* 1.8*  --  1.7* 1.2*   AST 16 16  --  17 18  --  14 16   ALKPHOS 103 102  --  95 99  --  80 85   ALT 6* 6*  --  7* <5*  --  <5* <5*       I have personally reviewed the above laboratory studies.     Imaging:  EKG (my interpretation): No new today    X-Ray Chest AP Portable   Final Result      Slight obscuration of the left diaphragmatic border when compared to radiograph dated 05/26/2023, possibly related to adjacent atelectasis or developing consolidation.  Follow-up radiograph recommended.         Electronically signed by: Juan Manuel Barnes MD   Date:    06/08/2023   Time:    08:25      US Carotid Bilateral   Final Result      No evidence of a hemodynamically significant carotid bifurcation stenosis.         Electronically signed by: Hipolito Paul Jr   Date:    06/07/2023   Time:    15:02      Fl Modified Barium Swallow Speech   Final Result      As above.  Please see documentation from the speech pathologist for additional findings and recommendations.         Electronically signed by: Enmanuel Bell   Date:    06/06/2023   Time:    14:26      CTA Head and Neck (xpd)   Final Result      1. Unremarkable CTA head and  neck.  No large vessel occlusion or high-grade stenosis.   2. Atherosclerotic disease of the bilateral carotid bifurcations, and the bilateral intracranial ICAs and vertebral arteries as above.   3. Known acute infarctions better evaluated on recently performed MRI.   4. Senescent changes and chronic microvascular ischemic changes.   5. Pulmonary artery dilation which can be seen with pulmonary hypertension.         Electronically signed by: Gurdeep Cho   Date:    06/05/2023   Time:    18:39      MRI Brain Without Contrast   Final Result   Abnormal      1. Multiple small acute infarctions in the left cerebellar hemisphere and in the right corona radiata.   2. Senescent changes and chronic microvascular ischemic changes.   This report was flagged in Epic as abnormal.      Dr. Gurdeep Cho discussed critical findings with Dr. Nyasia Cho by telephone at 19:04 on 06/04/2023.         Electronically signed by: Gurdeep Cho   Date:    06/04/2023   Time:    19:07      CT Head Without Contrast   Final Result      Small hypodensity in the left cerebellar hemisphere compatible with an age-indeterminate lacunar infarction.  Otherwise no acute intracranial abnormality.  Further evaluation with MRI as clinically indicated.         Electronically signed by: Gurdeep Cho   Date:    06/04/2023   Time:    17:06          Current Medications:  Scheduled:   aspirin  81 mg Oral Daily    atorvastatin  40 mg Oral Daily    azithromycin  500 mg Oral Daily    carvediloL  12.5 mg Oral Daily    cefTRIAXone (ROCEPHIN) IVPB  2 g Intravenous Q24H    cyanocobalamin  1,000 mcg Intramuscular Weekly    folic acid  1 mg Oral Daily    hydroCHLOROthiazide  12.5 mg Oral Daily    rivaroxaban  20 mg Oral Daily with dinner    valsartan  40 mg Oral Daily     Infusions:    PRN:  acetaminophen, pneumoc 20-richmond conj-dip cr(PF), sodium chloride 0.9%    Assessment:     Dejuan Correa is a 78 y.o.  male who has a past medical history of paroxysmal Atrial  fibrillation, Supraventricular tachycardia, HTN, HLD, and alcohol use disorder with hx of complicated withdrawal. The patient presented to [facility] on 6/4/2023 with a primary complaint of slurred speech and left sided weakness the afternoon of admission, found to have multiple small acute L cerebellar and R corona radiata infarcts on MRI brain. Neurology consulted, appreciate recs. Xarelto restarted. Found to have likely aspiration pneumonitis vs pneumonia 6/8, began on antibiotics with clinical improvement and in leukocytosis. Therapies recomending IPR for patient.    Plan:     Acute cerebellar CVA  - slurred speech + mild L sided weakness, L pronator drift in patient on Xarelto for pAfib  - NIH scale:  - not TPA candidate, on xarelto for P-afib  - CT head: age indeterminate hypodensity L cerebellum  - MRI brain: multiple small acute infarctions in the left cerebellar hemisphere and in the right corona radiata.  - multifocal CVA suggests embolic etiology- has rate controlled P-afib adherent to Xarelto  - TIA ABCD2 score = 6  - CTA without LVO or high grade stenosis; atherosclerotic disease of carotids bilaterally   - L-Neurology on board  - A1c 5.2, TSH wnl, B12 263, Folate 3.2  - Lipid panel with HDL 31, LDL 68  - Xarelto restarted per neuro recs on 6/6/23  - Carotid US w/o significant stenosis   Plan:  - Neurology recs  - Frequent neuro-checks (q4h)  - Head of bed > 30 degrees for aspiration prevention and aspiration precautions ordered.  - Continue Atorvastatin 40 mg daily (long-term goal LDL < 70)  - Continue ASA 81 daily  - Continue Xarelto 20mg daily  - Stroke education and counseling  - Physical therapy, occupational therapy, speech therapy   - Recommend IPR   - Full pureed diet (IDDSI Level 4) with Nectar thick fluids    Leukocytosis  - WBC 16.83 on 06/8/23-->13.61  - Afebrile, benign physical exam  - CXR with possible consolidation  - UA negative  - Ceftriaxone and azithromycin  initiated   Plan:  - Continue abx with transition to PO w/ discharge     Paroxysmal A-fib  - reported adherent to coreg and xarelto, multifocal CVA raises concern for thromboembolic source  - NS with normal rate since admission   - resumed xarelto 6/6/23 per neuro recs  Plan:  - Monitor on telemetry  - Continue coreg 12.5  - Continue Xarelto 20mg daily     HTN   - home meds: coreg 12.5 daily, HCTZ 12.5, valsartan 40   Plan:  - Continue home medications     HLD  - not on statin  - Lipid panel with HDL 31, LDL 68  Plan:  -Atorvastatin 40 mg daily (long-term goal LDL < 70)     Severe alcohol use disorder in early remission with hx complicated withdrawal  - reports no alcohol use since hospital discharge (last drink >7 days)  - ethanol <10 on admission  - no clinical evidence of acute withdrawal at this time  Plan:  - monitor on tele, CIWA Q6     Macrocytic anemia  - mild anemia, MCV>100, likely 2/2 alcohol use   - B12 and folate deficient  Plan:  - B12 1000mcg IM weekly for 4-weeks  - Folate 1mg PO daily      Diet: Diet Dysphagia Pureed (IDDSI Level 4) [facility]; Standard Tray; Nectar Thick   DVT Prophylaxis: SCDs until neuro eval for LMWH  Code: Full Code     Social: daughter vineet updated over phone, brother updated at bedside  Dispo: Pending IPR acceptance    Davida Cervantes MD  [facility] IM/Peds PGY3/HO2    [facility] Medicine Hospitalist Pager numbers:   [facility] Hospitalist Medicine Team A (Anila/Priscilla): [phone]  [facility] Hospitalist Medicine Team B (Reji/Samina):  [phone]

## 2023-06-10 NOTE — PT/OT/SLP PROGRESS
"Physical Therapy Treatment    Patient Name:  Dejuan Correa   MRN:  [id number]    Recommendations:     Discharge Recommendations:  (High Intensity Therapy)  Discharge Equipment Recommendations:  (TBD)  Barriers to discharge:  requires increased level of assistance for all functional mobility.    Assessment:     Dejuan Correa is a 78 y.o. male admitted with a medical diagnosis of Cerebellar stroke.  He presents with the following impairments/functional limitations: weakness, impaired endurance, impaired self care skills, impaired functional mobility, gait instability, impaired balance, decreased coordination, decreased upper extremity function, decreased lower extremity function, decreased safety awareness, pain, decreased ROM, abnormal tone, impaired fine motor, impaired coordination. Pt able to sit at EOB ~25 min initially requiring min/mod A secondary to a left lean progressing to SBA and occasional CGA. 2 sit to stand transfer trials with RW requiring max A of 2 people. Pt with an increased trunk and left knee flexion which required max tactile cueing. Pt able to increase trunk extension but unable to hold for an extended time. Required max tactile assistance at LLE to decrease knee flexion with pt unable to hold at this time without assistance. Would benefit from continued PT services to increase pt's out of bed therapeutic activity and exercises.    Rehab Prognosis: Good and Fair; patient would benefit from acute skilled PT services to address these deficits and reach maximum level of function.    Recent Surgery: * No surgery found *      Plan:     During this hospitalization, patient to be seen 6 x/week to address the identified rehab impairments via gait training, therapeutic activities, therapeutic exercises, neuromuscular re-education and progress toward the following goals:    Plan of Care Expires:  07/05/23    Subjective     Chief Complaint: None Expressed  Patient/Family Comments/goals: "I feel good and " "yes I will work with you".  Pain/Comfort:  Pain Rating 1:  (Did not rate)  Location - Side 1: Left  Location - Orientation 1: generalized  Location 1: knee  Pain Addressed 1: Reposition, Distraction, Cessation of Activity  Pain Rating Post-Intervention 1:  (Did not rate)      Objective:     Communicated with  nurse prior to session.  Patient found supine with bed alarm, peripheral IV, telemetry upon PT entry to room.     General Precautions: Standard, aspiration, pureed diet, seizure, nectar thick, fall  Orthopedic Precautions: N/A  Braces: N/A  Respiratory Status: Room air     Functional Mobility:  Bed Mobility:     Rolling Left:  minimum assistance and using bed rail  Rolling Right: minimum assistance, moderate assistance, and using bed rail requiring assistance for placement of left hand onto bed rail  Scooting: minimum assistance and to the EOB. Pt using RUE and RLE and attempting to weight bear through LUE and LLE to perform scooting to EOB. Required assistance for proper placement of LUE and LLE to perform movement.  Supine to Sit: moderate assistance and maximal assistance  Sit to Supine: moderate assistance, maximal assistance, and assistance to advance BLE's more at LLE  Transfers:     Sit to Stand:  maximal assistance and of 2 persons with rolling walker Required assistance to properly place L hand onto and to  RW handle.      AM-PAC 6 CLICK MOBILITY  Turning over in bed (including adjusting bedclothes, sheets and blankets)?: 3  Sitting down on and standing up from a chair with arms (e.g., wheelchair, bedside commode, etc.): 2  Moving from lying on back to sitting on the side of the bed?: 3  Moving to and from a bed to a chair (including a wheelchair)?: 2  Need to walk in hospital room?: 1  Climbing 3-5 steps with a railing?: 1  Basic Mobility Total Score: 12       Treatment & Education:  Upon entering room pt's wife and daughter speaking with pt. Pt able to sit at EOB ~25 minutes initially requiring " min/mod A secondary to a left lean then progressed to SBA with occassional CGA. Pt performed reaching exercises to the right to increase midline orientation(decreasing left lean), 2 sets of 10 reps and a 3rd set of 5 reps. Performed 1 x 10 LAQ's AAROM LLE. Pt initiated movement and PTA assisted to full ROM available. Able to perform 2 sit to stand transfer trials with RW requiring max A of 2 people. Required assistance to properly place L hand onto and to  RW handle. Pt presents with an increased trunk and knee flexion which required max tactile cueing to correct. Pt able to increase trunk flexion but unable to hold position. Pt unable to decrease LLE knee extension requiring max A to achieve and hold while in standing. Scooted along the EOB in sitting to reach HOB requiring max/total A. In supine pt able to grab/hold headboard and pull self up with SBA and bed in slight trendelenburg. Pt is very motivated and received a lot of encouragement from wife and daughter who were in room during session.    Patient left supine with all lines intact, call button in reach, bed alarm on, nurse notified, and wife and daughter present..    GOALS:   Multidisciplinary Problems       Physical Therapy Goals          Problem: Physical Therapy    Goal Priority Disciplines Outcome Goal Variances Interventions   Physical Therapy Goal     PT, PT/OT Ongoing, Progressing     Description: Goals to be met by: 2023     Patient will increase functional independence with mobility by performin. Supine to sit with MInimal Assistance  2. Sit to supine with MInimal Assistance  3. Rolling to Left and Right with Minimal Assistance.  4. Sit to stand transfer with Minimal Assistance  5. Bed to chair transfer with Minimal Assistance using Rolling Walker  6. Gait  x 10 feet with Maximum Assistance using LRAD.  7. Stand for 3 minutes with Moderate Assistance using LRAD to perform a functional activity.                           Time  Tracking:     PT Received On: 06/10/23  PT Start Time: 0747     PT Stop Time: 0843  PT Total Time (min): 56 min     Billable Minutes: Therapeutic Activity 23 and Neuromuscular Re-education 33    Treatment Type: Treatment  PT/PTA: PTA     Number of PTA visits since last PT visit: 4     06/10/2023

## 2023-06-10 NOTE — PROGRESS NOTES
ELLEN rec'd a phone call from Alejandra Correa, Daughter for Pt.  She has come from [place], [place] to see about her father.  She will be leaving on Sunday to head back to [place].  She was wondering about her Father's transition decision.  ELLEN informed Fernanda had been working on this case and I would send a message to her to return the call on Monday.    Alejandra ph#  [phone].

## 2023-06-10 NOTE — PLAN OF CARE
Problem: Physical Therapy  Goal: Physical Therapy Goal  Description: Goals to be met by: 2023     Patient will increase functional independence with mobility by performin. Supine to sit with MInimal Assistance  2. Sit to supine with MInimal Assistance  3. Rolling to Left and Right with Minimal Assistance.  4. Sit to stand transfer with Minimal Assistance  5. Bed to chair transfer with Minimal Assistance using Rolling Walker  6. Gait  x 10 feet with Maximum Assistance using LRAD.  7. Stand for 3 minutes with Moderate Assistance using LRAD to perform a functional activity.      Outcome: Ongoing, Progressing   Pt continues to work toward all goals. Pt able to sit at EOB ~25 minutes initially with Min/mod A secondary to left lean then progressed to SBA (occasionally CGA). 2 sit to stand transfers performed with RW and max A of 2 people. Max tactile cueing for facilitation to increase trunk and left knee extension in standing.

## 2023-06-11 LAB
ALBUMIN SERPL BCP-MCNC: 2.8 G/DL (ref 3.5–5.2)
ALP SERPL-CCNC: 80 U/L (ref 55–135)
ALT SERPL W/O P-5'-P-CCNC: 5 U/L (ref 10–44)
ANION GAP SERPL CALC-SCNC: 7 MMOL/L (ref 8–16)
AST SERPL-CCNC: 16 U/L (ref 10–40)
BILIRUB SERPL-MCNC: 0.9 MG/DL (ref 0.1–1)
BUN SERPL-MCNC: 21 MG/DL (ref 8–23)
CALCIUM SERPL-MCNC: 9.8 MG/DL (ref 8.7–10.5)
CHLORIDE SERPL-SCNC: 101 MMOL/L (ref 95–110)
CO2 SERPL-SCNC: 32 MMOL/L (ref 23–29)
CREAT SERPL-MCNC: 1.2 MG/DL (ref 0.5–1.4)
EST. GFR  (NO RACE VARIABLE): >60 ML/MIN/1.73 M^2
GLUCOSE SERPL-MCNC: 125 MG/DL (ref 70–110)
POCT GLUCOSE: 120 MG/DL (ref 70–110)
POCT GLUCOSE: 121 MG/DL (ref 70–110)
POCT GLUCOSE: 175 MG/DL (ref 70–110)
POCT GLUCOSE: 178 MG/DL (ref 70–110)
POTASSIUM SERPL-SCNC: 3.7 MMOL/L (ref 3.5–5.1)
PROT SERPL-MCNC: 6.8 G/DL (ref 6–8.4)
SODIUM SERPL-SCNC: 140 MMOL/L (ref 136–145)

## 2023-06-11 PROCEDURE — 92526 ORAL FUNCTION THERAPY: CPT

## 2023-06-11 PROCEDURE — 25000003 PHARM REV CODE 250: Performed by: STUDENT IN AN ORGANIZED HEALTH CARE EDUCATION/TRAINING PROGRAM

## 2023-06-11 PROCEDURE — 97110 THERAPEUTIC EXERCISES: CPT

## 2023-06-11 PROCEDURE — 25000003 PHARM REV CODE 250

## 2023-06-11 PROCEDURE — 80053 COMPREHEN METABOLIC PANEL: CPT | Performed by: STUDENT IN AN ORGANIZED HEALTH CARE EDUCATION/TRAINING PROGRAM

## 2023-06-11 PROCEDURE — 63600175 PHARM REV CODE 636 W HCPCS: Performed by: STUDENT IN AN ORGANIZED HEALTH CARE EDUCATION/TRAINING PROGRAM

## 2023-06-11 PROCEDURE — 36415 COLL VENOUS BLD VENIPUNCTURE: CPT | Performed by: STUDENT IN AN ORGANIZED HEALTH CARE EDUCATION/TRAINING PROGRAM

## 2023-06-11 PROCEDURE — 94761 N-INVAS EAR/PLS OXIMETRY MLT: CPT

## 2023-06-11 PROCEDURE — 92507 TX SP LANG VOICE COMM INDIV: CPT

## 2023-06-11 PROCEDURE — 11000001 HC ACUTE MED/SURG PRIVATE ROOM

## 2023-06-11 PROCEDURE — 63700000 PHARM REV CODE 250 ALT 637 W/O HCPCS: Performed by: STUDENT IN AN ORGANIZED HEALTH CARE EDUCATION/TRAINING PROGRAM

## 2023-06-11 RX ORDER — INSULIN ASPART 100 [IU]/ML
0-5 INJECTION, SOLUTION INTRAVENOUS; SUBCUTANEOUS
Status: DISCONTINUED | OUTPATIENT
Start: 2023-06-11 | End: 2023-06-12 | Stop reason: HOSPADM

## 2023-06-11 RX ORDER — DEXTROSE 40 %
15 GEL (GRAM) ORAL
Status: DISCONTINUED | OUTPATIENT
Start: 2023-06-11 | End: 2023-06-12 | Stop reason: HOSPADM

## 2023-06-11 RX ORDER — GLUCAGON 1 MG
1 KIT INJECTION
Status: DISCONTINUED | OUTPATIENT
Start: 2023-06-11 | End: 2023-06-12 | Stop reason: HOSPADM

## 2023-06-11 RX ORDER — DEXTROSE 40 %
30 GEL (GRAM) ORAL
Status: DISCONTINUED | OUTPATIENT
Start: 2023-06-11 | End: 2023-06-12 | Stop reason: HOSPADM

## 2023-06-11 RX ADMIN — ASPIRIN 81 MG: 81 TABLET, COATED ORAL at 09:06

## 2023-06-11 RX ADMIN — ACETAMINOPHEN 650 MG: 325 TABLET ORAL at 11:06

## 2023-06-11 RX ADMIN — VALSARTAN 40 MG: 40 TABLET, FILM COATED ORAL at 09:06

## 2023-06-11 RX ADMIN — FOLIC ACID 1 MG: 1 TABLET ORAL at 09:06

## 2023-06-11 RX ADMIN — RIVAROXABAN 20 MG: 20 TABLET, FILM COATED ORAL at 05:06

## 2023-06-11 RX ADMIN — ATORVASTATIN CALCIUM 40 MG: 40 TABLET, FILM COATED ORAL at 09:06

## 2023-06-11 RX ADMIN — AZITHROMYCIN MONOHYDRATE 500 MG: 250 TABLET ORAL at 09:06

## 2023-06-11 RX ADMIN — CEFTRIAXONE SODIUM 2 G: 2 INJECTION, POWDER, FOR SOLUTION INTRAMUSCULAR; INTRAVENOUS at 02:06

## 2023-06-11 RX ADMIN — CARVEDILOL 12.5 MG: 12.5 TABLET, FILM COATED ORAL at 09:06

## 2023-06-11 RX ADMIN — HYDROCHLOROTHIAZIDE 12.5 MG: 12.5 TABLET ORAL at 09:06

## 2023-06-11 NOTE — PT/OT/SLP PROGRESS
Physical Therapy Treatment    Patient Name:  Dejuan Correa   MRN:  [id number]    Recommendations:     Discharge Recommendations: rehabilitation facility, other (see comments) (HIT in next setting)  Discharge Equipment Recommendations:  (TBD)  Barriers to discharge:  Pt requiring sig increased assistance from premorbid levels and ongoing medical care at this time    Assessment:     Dejuan Correa is a 78 y.o. male admitted with a medical diagnosis of Cerebellar stroke.  He presents with the following impairments/functional limitations: weakness, impaired sensation, impaired self care skills, impaired functional mobility, gait instability, impaired endurance, impaired balance, decreased coordination, decreased upper extremity function, decreased lower extremity function, decreased ROM, abnormal tone, pain, decreased safety awareness, impaired coordination, impaired fine motor.  Pt continues to progress toward achieving PT POC goals.  Pt cooperative and motivated during sessions.  Rec ongoing acute therapy participation and HIT with PT, OT, & ST upon discharge to next setting to maximize potential for recovery to premorbid status with ADL and functional mobility independence levels.     Rehab Prognosis: Good; patient would benefit from acute skilled PT services to address these deficits and reach maximum level of function.    Recent Surgery: * No surgery found *      Plan:     During this hospitalization, patient to be seen 6 x/week to address the identified rehab impairments via gait training, therapeutic activities, therapeutic exercises, neuromuscular re-education and progress toward the following goals:    Plan of Care Expires:  07/05/23    Subjective     Chief Complaint: reports no pain today in general or resting - though mild discomfort w ROM/gentle stretch LLE & LUE  Patient/Family Comments/goals: PLOF  Pain/Comfort:  Pain Rating 1: 0/10      Objective:     Communicated with nsg prior to session.  Patient  found supine as much as comfortable with bed alarm, telemetry upon PT entry to room.     General Precautions: Standard, fall, seizure  Orthopedic Precautions: N/A  Braces: N/A  Respiratory Status: Room air     Functional Mobility:  Bed mobility training w B rolling trials w focus on achieving and maintaining most beneficial LE and UE positioning to transition in each direction w self assist using bed rails and improving body placement awareness and technique;  sup scoot training  Bedding adjustment training utilizing each body part as able to achieve outcome desired and ed for pressure relief w self assist as able and to communicate/educate those assisting him      AM-PAC 6 CLICK MOBILITY  Turning over in bed (including adjusting bedclothes, sheets and blankets)?: 3  Sitting down on and standing up from a chair with arms (e.g., wheelchair, bedside commode, etc.): 2  Moving from lying on back to sitting on the side of the bed?: 2  Moving to and from a bed to a chair (including a wheelchair)?: 2  Need to walk in hospital room?: 1  Climbing 3-5 steps with a railing?: 1  Basic Mobility Total Score: 11       Treatment & Education:  Ed provided throughout session for PT POC, safe technique during each mobility task performed, positioning supporting joints and pressure relief importance, and upright HOB as tolerated during recovery w pt expressing understanding and compliance throughout.  Pt perf RLE and RUE active therex 2 x 6 reps w VC and intermittent MC for technique improvement along with AAROM 2 x 8 reps LUE and LLE w VC/MC w facilitation & neuro ari to increase active voluntary mm activation both during exercise and bed mobility training; perf trials rolling w focus self assistance and body positioning w each direction min/mod assist as well during sup scoot max assist but focus on increasing active participation and self assistance.    Patient left supine w HOB elevated as able w comfort and w wedge pillow support  for improved LLE resting position in neutral rotation rather than resting in external rotation and with all lines intact, call button in reach, bed alarm on, and nsg notified..    GOALS:   Multidisciplinary Problems       Physical Therapy Goals          Problem: Physical Therapy    Goal Priority Disciplines Outcome Goal Variances Interventions   Physical Therapy Goal     PT, PT/OT Ongoing, Progressing     Description: Goals to be met by: 2023     Patient will increase functional independence with mobility by performin. Supine to sit with MInimal Assistance  2. Sit to supine with MInimal Assistance  3. Rolling to Left and Right with Minimal Assistance.  4. Sit to stand transfer with Minimal Assistance  5. Bed to chair transfer with Minimal Assistance using Rolling Walker  6. Gait  x 10 feet with Maximum Assistance using LRAD.  7. Stand for 3 minutes with Moderate Assistance using LRAD to perform a functional activity.                           Time Tracking:     PT Received On: 23  PT Start Time: 1214     PT Stop Time: 1228  PT Total Time (min): 14 min     Billable Minutes: Therapeutic Exercise 14    Treatment Type: Treatment  PT/PTA: PT     Number of PTA visits since last PT visit: 0     2023

## 2023-06-11 NOTE — PLAN OF CARE
Problem: SLP  Goal: SLP Goal  Description: Short Term Goals:  1. Pt will participate in ongoing swallow assessment to determine least restrictive diet.   2. Pt will tolerate FULL liquid diet with no audible s/s of dysphagia and good oral clearance. -- MET  3. Pt will consume 75% of FULL Liquid without overt s/s of aspiration given min assist.  --MET  4. Pt will implement safe swallowing strategies 100% of the time given min assist.   5. Patient will successfully participate in [id number]-[id number] evaluation to further assess for any communication impairments s/p stroke  6. Pt will tolerate advanced trials of PO with no outward s/s of dysphagia.   7. Pt will state personal info with min cues.  8. Pt will perform OMEx with mod cues to increase oral motor coordination for speech and swallowing.   9. Pt will complete written and reading tasks to determine further goals.   10. Patient will tolerate soft consistency/thin liquids po diet with no overt s/s of aspiration.      Outcome: Ongoing, Progressing     6/11/2023: Ongoing therapy completed. Patient with great improvements on this date. Patient cleared for diet advancement to soft/bite sized with thin liquids. Speech was 100% intelligible. Slight dysarthria does persist. ST will follow.   Jarrett Goins M.S., CCC-SLP   Speech Language Pathologist

## 2023-06-11 NOTE — PLAN OF CARE
Problem: Physical Therapy  Goal: Physical Therapy Goal  Description: Goals to be met by: 2023     Patient will increase functional independence with mobility by performin. Supine to sit with MInimal Assistance  2. Sit to supine with MInimal Assistance  3. Rolling to Left and Right with Minimal Assistance.  4. Sit to stand transfer with Minimal Assistance  5. Bed to chair transfer with Minimal Assistance using Rolling Walker  6. Gait  x 10 feet with Maximum Assistance using LRAD.  7. Stand for 3 minutes with Moderate Assistance using LRAD to perform a functional activity.      Outcome: Ongoing, Progressing     Pt continues to progress toward achieving PT POC goals.  Pt cooperative and motivated during sessions.  Rec ongoing acute therapy participation and HIT with PT, OT, & ST upon discharge to next setting to maximize potential for recovery to premorbid status with ADL and functional mobility independence levels.

## 2023-06-11 NOTE — PT/OT/SLP PROGRESS
"Speech Language Pathology Treatment    Patient Name:  Dejuan Correa   MRN:  [id number]  Admitting Diagnosis: Cerebellar stroke    Recommendations:                 General Recommendations:  Dysphagia therapy and Speech/language therapy  Diet recommendations:  Soft & Bite Sized Diet - IDDSI Level 6, Liquid Diet Level: Thin liquids - IDDSI Level 0   Aspiration Precautions: 1 bite/sip at a time, Alternating bites/sips, Eliminate distractions, Feed only when awake/alert, HOB to 90 degrees, Meds whole 1 at a time, Monitor for s/s of aspiration, Remain upright 30 minutes post meal, Small bites/sips, and Strict aspiration precautions   General Precautions: Standard, fall, seizure  Communication strategies:  none    Assessment:     Dejuan Correa is a 78 y.o. male admitted with cerebellar CVA with an SLP diagnosis of mild Dysphagia and slight Dysarthria. ST recommending diet advancement to soft/bite sized with thin liquids at this time. ST will follow.     Subjective     RN approved ST for evaluation. ST entered the patients room with the patient laying in bed, awake/alert. The patient was able to greet ST and was agreeable to tx. Patients wife and dtr were both present at the bedside.  Patient goals: "I am doing better today"    Pain/Comfort:  Pain Rating 1: 0/10    Respiratory Status: Room air    Objective:     Has the patient been evaluated by SLP for swallowing?   Yes  Keep patient NPO? No   Current Respiratory Status:  RA    ST repositioned the patient in bed to a 90 degree angle. The patient was agreeable to po trials with ST. ST offered the patient cup edge sips of thin liquids, single straw sips of thin liquids, tsp bites of diced peaches and bite sized pieces of cole crackers. The patient was able to consume all trials without overt s/s of aspiration. ST did verbally cue the patient x3 to take small, single sips. ST recommending diet advancement to soft/bite sized with thin liquids at this time. ST reviewed " importance of utilizing all safe swallowing precautions at this time. ST educated the patient on use of dentures with soft consistencies. Patient verbalized good understanding to all information provided.     Patient AAOx4, accurately named items and stated function of objects. Patient with audible, clear speech. Patients family stating his speech is not 100% back to baseline yet - slightly dysarthria does persist. ST educated the patient on SOS speech strategies (speak loud, over exaggerate, speak slow). Patient then participated in hierarchy dysarthria drills x8.     Goals:   Multidisciplinary Problems       SLP Goals          Problem: SLP    Goal Priority Disciplines Outcome   SLP Goal     SLP Ongoing, Progressing   Description: Short Term Goals:  1. Pt will participate in ongoing swallow assessment to determine least restrictive diet.   2. Pt will tolerate FULL liquid diet with no audible s/s of dysphagia and good oral clearance. -- MET  3. Pt will consume 75% of FULL Liquid without overt s/s of aspiration given min assist.  --MET  4. Pt will implement safe swallowing strategies 100% of the time given min assist.   5. Patient will successfully participate in qhysgi-[id number]-ilsohebnf evaluation to further assess for any communication impairments s/p stroke  6. Pt will tolerate advanced trials of PO with no outward s/s of dysphagia.   7. Pt will state personal info with min cues.  8. Pt will perform OMEx with mod cues to increase oral motor coordination for speech and swallowing.   9. Pt will complete written and reading tasks to determine further goals.   10. Patient will tolerate soft consistency/thin liquids po diet with no overt s/s of aspiration.                                Plan:     Patient to be seen:  3 x/week   Plan of Care expires:  07/04/23  Plan of Care reviewed with:  patient, spouse, daughter   SLP Follow-Up:  Yes       Discharge recommendations:   (TBD)   Barriers to Discharge:  None    Time  Tracking:     SLP Treatment Date:   06/11/23  Speech Start Time:  1130  Speech Stop Time:  1156     Speech Total Time (min):  26 min    Billable Minutes: Speech Therapy Individual 13 and Treatment Swallowing Dysfunction 13    06/11/2023    Jarrett Goins M.S., CCC-SLP   Speech Language Pathologist

## 2023-06-11 NOTE — PROGRESS NOTES
[facility] Medicine Progress Note    Primary Team: [facility] Hospitalist Team B  Attending Physician: Reji  Resident: Lisa/Billy  Intern: JermaineAjay    [facility] Day: 6    Subjective:      Patient looking fantastic this morning. Conversant and pleasant, oriented fully. No fevers, coughing, SOB.     Objective:   Last 24 Hour Vital Signs:  BP  Min: 125/61  Max: 147/65  Temp  Av.2 °F (36.8 °C)  Min: 97.2 °F (36.2 °C)  Max: 99.4 °F (37.4 °C)  Pulse  Av.5  Min: 70  Max: 94  Resp  Av.6  Min: 18  Max: 60  SpO2  Av.8 %  Min: 93 %  Max: 98 %    Intake/Output Summary (Last 24 hours) at 2023 0720  Last data filed at 2023 0632  Gross per 24 hour   Intake --   Output 300 ml   Net -300 ml        Physical Examination:  Physical Exam  Vitals and nursing note reviewed.   Constitutional:       Appearance: Normal appearance.   HENT:      Head: Normocephalic and atraumatic.      Right Ear: External ear normal.      Left Ear: External ear normal.      Nose: Nose normal. No congestion or rhinorrhea.      Mouth/Throat:      Mouth: Mucous membranes are moist.      Pharynx: Oropharynx is clear.   Eyes:      Pupils: Pupils are equal, round, and reactive to light.   Cardiovascular:      Rate and Rhythm: Normal rate and regular rhythm.   Pulmonary:      Effort: Pulmonary effort is normal.      Breath sounds: Normal breath sounds. No wheezing or rales.   Abdominal:      General: Abdomen is flat. Bowel sounds are normal.      Palpations: Abdomen is soft.   Musculoskeletal:      Right lower leg: No edema.      Left lower leg: No edema.   Skin:     Capillary Refill: Capillary refill takes less than 2 seconds.      Findings: No erythema or rash.   Neurological:      Mental Status: He is alert.   Psychiatric:         Mood and Affect: Mood normal.         Behavior: Behavior normal.         Laboratory:  Recent Labs   Lab 23  1635 23  0513 23  0514 23  0439 23  0346 23  0732  06/09/23  0733 06/10/23  0508 06/11/23  0513   WBC 10.06  --  8.95 9.58 16.83* 13.61*  --   --   --    HGB 13.5*  --  13.4* 13.0* 12.5* 11.9*  --   --   --    HCT 41.6  --  41.1 40.6 38.9* 36.9*  --   --   --      --  267 245 264 243  --   --   --    *  --  102* 102* 102* 101*  --   --   --    RDW 13.5  --  13.4 13.4 13.5 13.6  --   --   --       < >  --  139 143  --  140 141 140   K 3.9   < >  --  4.1 3.9  --  3.4* 3.9 3.7      < >  --  102 101  --  101 102 101   CO2 26   < >  --  23 27  --  27 26 32*   BUN 14   < >  --  13 22  --  23 21 21   CREATININE 1.2   < >  --  1.1 1.3  --  1.4 1.1 1.2   *   < >  --  88 111*  --  111* 107 125*   PROT 6.7   < >  --  6.9 7.2  --  6.6 6.7 6.8   ALBUMIN 3.4*   < >  --  3.2* 3.2*  --  2.8* 2.8* 2.8*   BILITOT 1.2*   < >  --  1.6* 1.8*  --  1.7* 1.2* 0.9   AST 16   < >  --  17 18  --  14 16 16   ALKPHOS 103   < >  --  95 99  --  80 85 80   ALT 6*   < >  --  7* <5*  --  <5* <5* 5*    < > = values in this interval not displayed.       I have personally reviewed the above laboratory studies.     Imaging:  EKG (my interpretation): No new today    X-Ray Chest AP Portable   Final Result      Slight obscuration of the left diaphragmatic border when compared to radiograph dated 05/26/2023, possibly related to adjacent atelectasis or developing consolidation.  Follow-up radiograph recommended.         Electronically signed by: Juan Manuel Barnes MD   Date:    06/08/2023   Time:    08:25      US Carotid Bilateral   Final Result      No evidence of a hemodynamically significant carotid bifurcation stenosis.         Electronically signed by: Hipolito Paul Jr   Date:    06/07/2023   Time:    15:02      Fl Modified Barium Swallow Speech   Final Result      As above.  Please see documentation from the speech pathologist for additional findings and recommendations.         Electronically signed by: Enmanuel Bell   Date:    06/06/2023   Time:    14:26      CTA  Head and Neck (xpd)   Final Result      1. Unremarkable CTA head and neck.  No large vessel occlusion or high-grade stenosis.   2. Atherosclerotic disease of the bilateral carotid bifurcations, and the bilateral intracranial ICAs and vertebral arteries as above.   3. Known acute infarctions better evaluated on recently performed MRI.   4. Senescent changes and chronic microvascular ischemic changes.   5. Pulmonary artery dilation which can be seen with pulmonary hypertension.         Electronically signed by: Gurdeep Cho   Date:    06/05/2023   Time:    18:39      MRI Brain Without Contrast   Final Result   Abnormal      1. Multiple small acute infarctions in the left cerebellar hemisphere and in the right corona radiata.   2. Senescent changes and chronic microvascular ischemic changes.   This report was flagged in Epic as abnormal.      Dr. Gurdeep Cho discussed critical findings with Dr. Nyasia Cho by telephone at 19:04 on 06/04/2023.         Electronically signed by: Gurdeep Cho   Date:    06/04/2023   Time:    19:07      CT Head Without Contrast   Final Result      Small hypodensity in the left cerebellar hemisphere compatible with an age-indeterminate lacunar infarction.  Otherwise no acute intracranial abnormality.  Further evaluation with MRI as clinically indicated.         Electronically signed by: Gurdeep Cho   Date:    06/04/2023   Time:    17:06          Current Medications:  Scheduled:   aspirin  81 mg Oral Daily    atorvastatin  40 mg Oral Daily    azithromycin  500 mg Oral Daily    carvediloL  12.5 mg Oral Daily    cefTRIAXone (ROCEPHIN) IVPB  2 g Intravenous Q24H    cyanocobalamin  1,000 mcg Intramuscular Weekly    folic acid  1 mg Oral Daily    hydroCHLOROthiazide  12.5 mg Oral Daily    rivaroxaban  20 mg Oral Daily with dinner    valsartan  40 mg Oral Daily     Infusions:    PRN:  acetaminophen, pneumoc 20-richmond conj-dip cr(PF), sodium chloride 0.9%    Assessment:     Dejuan win a  78 y.o.  male who has a past medical history of paroxysmal Atrial fibrillation, Supraventricular tachycardia, HTN, HLD, and alcohol use disorder with hx of complicated withdrawal. The patient presented to [facility] on 6/4/2023 with a primary complaint of slurred speech and left sided weakness the afternoon of admission, found to have multiple small acute L cerebellar and R corona radiata infarcts on MRI brain. Neurology consulted, appreciate recs. Xarelto restarted. Found to have likely aspiration pneumonitis vs pneumonia 6/8, began on antibiotics with clinical improvement and in leukocytosis. Therapies recomending IPR for patient.    Plan:     Acute cerebellar CVA  - slurred speech + mild L sided weakness, L pronator drift in patient on Xarelto for pAfib  - NIH scale:  - not TPA candidate, on xarelto for P-afib  - CT head: age indeterminate hypodensity L cerebellum  - MRI brain: multiple small acute infarctions in the left cerebellar hemisphere and in the right corona radiata.  - multifocal CVA suggests embolic etiology- has rate controlled P-afib adherent to Xarelto  - TIA ABCD2 score = 6  - CTA without LVO or high grade stenosis; atherosclerotic disease of carotids bilaterally   - L-Neurology on board  - A1c 5.2, TSH wnl, B12 263, Folate 3.2  - Lipid panel with HDL 31, LDL 68  - Xarelto restarted per neuro recs on 6/6/23  - Carotid US w/o significant stenosis   Plan:  - Neurology recs  - Frequent neuro-checks (q4h)  - Head of bed > 30 degrees for aspiration prevention and aspiration precautions ordered.  - Continue Atorvastatin 40 mg daily (long-term goal LDL < 70)  - Continue ASA 81 daily  - Continue Xarelto 20mg daily  - Stroke education and counseling  - Physical therapy, occupational therapy, speech therapy   - Recommend IPR   - Full pureed diet (IDDSI Level 4) with Nectar thick fluids    Leukocytosis  - WBC 16.83 on 06/8/23-->13.61  - Afebrile, benign physical exam  - CXR with possible  consolidation  - UA negative  - Ceftriaxone and azithromycin initiated   Plan:  - Continue abx with transition to PO w/ discharge     Paroxysmal A-fib  - reported adherent to coreg and xarelto, multifocal CVA raises concern for thromboembolic source  - NS with normal rate since admission   - resumed xarelto 6/6/23 per neuro recs  Plan:  - Monitor on telemetry  - Continue coreg 12.5  - Continue Xarelto 20mg daily     HTN   - home meds: coreg 12.5 daily, HCTZ 12.5, valsartan 40   Plan:  - Continue home medications     HLD  - not on statin  - Lipid panel with HDL 31, LDL 68  Plan:  -Atorvastatin 40 mg daily (long-term goal LDL < 70)     Severe alcohol use disorder in early remission with hx complicated withdrawal  - reports no alcohol use since hospital discharge (last drink >7 days)  - ethanol <10 on admission  - no clinical evidence of acute withdrawal at this time  Plan:  - monitor on tele, CIWA Q6     Macrocytic anemia  - mild anemia, MCV>100, likely 2/2 alcohol use   - B12 and folate deficient  Plan:  - B12 1000mcg IM weekly for 4-weeks  - Folate 1mg PO daily      Diet: Diet Dysphagia Pureed (IDDSI Level 4) [facility]; Standard Tray; Nectar Thick   DVT Prophylaxis: SCDs until neuro eval for LMWH  Code: Full Code     Social: daughter vineet updated over phone, brother updated at bedside  Dispo: Pending IPR acceptance    Davida Cervantes MD  [employer] IM/Peds PGY3/HO2    [facility] Medicine Hospitalist Pager numbers:   [facility] Hospitalist Medicine Team A (Anila/Priscilla): [phone]  [facility] Hospitalist Medicine Team B (Reji/Samina):  [phone]

## 2023-06-12 VITALS
HEART RATE: 81 BPM | BODY MASS INDEX: 30.37 KG/M2 | RESPIRATION RATE: 18 BRPM | DIASTOLIC BLOOD PRESSURE: 59 MMHG | HEIGHT: 71 IN | SYSTOLIC BLOOD PRESSURE: 125 MMHG | WEIGHT: 216.94 LBS | OXYGEN SATURATION: 95 % | TEMPERATURE: 98 F

## 2023-06-12 LAB
ALBUMIN SERPL BCP-MCNC: 2.8 G/DL (ref 3.5–5.2)
ALP SERPL-CCNC: 85 U/L (ref 55–135)
ALT SERPL W/O P-5'-P-CCNC: <5 U/L (ref 10–44)
ANION GAP SERPL CALC-SCNC: 11 MMOL/L (ref 8–16)
AST SERPL-CCNC: 18 U/L (ref 10–40)
BILIRUB SERPL-MCNC: 0.9 MG/DL (ref 0.1–1)
BUN SERPL-MCNC: 19 MG/DL (ref 8–23)
CALCIUM SERPL-MCNC: 9.7 MG/DL (ref 8.7–10.5)
CHLORIDE SERPL-SCNC: 102 MMOL/L (ref 95–110)
CO2 SERPL-SCNC: 26 MMOL/L (ref 23–29)
CREAT SERPL-MCNC: 1.1 MG/DL (ref 0.5–1.4)
ERYTHROCYTE [DISTWIDTH] IN BLOOD BY AUTOMATED COUNT: 13.9 % (ref 11.5–14.5)
EST. GFR  (NO RACE VARIABLE): >60 ML/MIN/1.73 M^2
GLUCOSE SERPL-MCNC: 124 MG/DL (ref 70–110)
HCT VFR BLD AUTO: 35.7 % (ref 40–54)
HGB BLD-MCNC: 11.7 G/DL (ref 14–18)
MAGNESIUM SERPL-MCNC: 1.3 MG/DL (ref 1.6–2.6)
MCH RBC QN AUTO: 32.1 PG (ref 27–31)
MCHC RBC AUTO-ENTMCNC: 32.8 G/DL (ref 32–36)
MCV RBC AUTO: 98 FL (ref 82–98)
PLATELET # BLD AUTO: 331 K/UL (ref 150–450)
PLATELET BLD QL SMEAR: NORMAL
PMV BLD AUTO: 11.2 FL (ref 9.2–12.9)
POCT GLUCOSE: 132 MG/DL (ref 70–110)
POCT GLUCOSE: 160 MG/DL (ref 70–110)
POTASSIUM SERPL-SCNC: 3.6 MMOL/L (ref 3.5–5.1)
PROT SERPL-MCNC: 6.6 G/DL (ref 6–8.4)
RBC # BLD AUTO: 3.64 M/UL (ref 4.6–6.2)
SODIUM SERPL-SCNC: 139 MMOL/L (ref 136–145)
WBC # BLD AUTO: 10.04 K/UL (ref 3.9–12.7)

## 2023-06-12 PROCEDURE — 80053 COMPREHEN METABOLIC PANEL: CPT | Performed by: STUDENT IN AN ORGANIZED HEALTH CARE EDUCATION/TRAINING PROGRAM

## 2023-06-12 PROCEDURE — 36415 COLL VENOUS BLD VENIPUNCTURE: CPT | Performed by: STUDENT IN AN ORGANIZED HEALTH CARE EDUCATION/TRAINING PROGRAM

## 2023-06-12 PROCEDURE — 83735 ASSAY OF MAGNESIUM: CPT | Performed by: STUDENT IN AN ORGANIZED HEALTH CARE EDUCATION/TRAINING PROGRAM

## 2023-06-12 PROCEDURE — 97112 NEUROMUSCULAR REEDUCATION: CPT

## 2023-06-12 PROCEDURE — 25000003 PHARM REV CODE 250: Performed by: STUDENT IN AN ORGANIZED HEALTH CARE EDUCATION/TRAINING PROGRAM

## 2023-06-12 PROCEDURE — 97535 SELF CARE MNGMENT TRAINING: CPT

## 2023-06-12 PROCEDURE — 25000003 PHARM REV CODE 250

## 2023-06-12 PROCEDURE — 97530 THERAPEUTIC ACTIVITIES: CPT

## 2023-06-12 PROCEDURE — 92526 ORAL FUNCTION THERAPY: CPT

## 2023-06-12 PROCEDURE — 63600175 PHARM REV CODE 636 W HCPCS: Performed by: STUDENT IN AN ORGANIZED HEALTH CARE EDUCATION/TRAINING PROGRAM

## 2023-06-12 PROCEDURE — 85027 COMPLETE CBC AUTOMATED: CPT | Performed by: STUDENT IN AN ORGANIZED HEALTH CARE EDUCATION/TRAINING PROGRAM

## 2023-06-12 RX ORDER — MAGNESIUM SULFATE HEPTAHYDRATE 40 MG/ML
2 INJECTION, SOLUTION INTRAVENOUS
Status: COMPLETED | OUTPATIENT
Start: 2023-06-12 | End: 2023-06-12

## 2023-06-12 RX ORDER — LEVOFLOXACIN 500 MG/1
500 TABLET, FILM COATED ORAL DAILY
Qty: 2 TABLET | Refills: 0
Start: 2023-06-13 | End: 2023-06-15

## 2023-06-12 RX ADMIN — FOLIC ACID 1 MG: 1 TABLET ORAL at 08:06

## 2023-06-12 RX ADMIN — RIVAROXABAN 20 MG: 20 TABLET, FILM COATED ORAL at 04:06

## 2023-06-12 RX ADMIN — CARVEDILOL 12.5 MG: 12.5 TABLET, FILM COATED ORAL at 08:06

## 2023-06-12 RX ADMIN — ACETAMINOPHEN 650 MG: 325 TABLET ORAL at 01:06

## 2023-06-12 RX ADMIN — ATORVASTATIN CALCIUM 40 MG: 40 TABLET, FILM COATED ORAL at 08:06

## 2023-06-12 RX ADMIN — VALSARTAN 40 MG: 40 TABLET, FILM COATED ORAL at 08:06

## 2023-06-12 RX ADMIN — ASPIRIN 81 MG: 81 TABLET, COATED ORAL at 08:06

## 2023-06-12 RX ADMIN — HYDROCHLOROTHIAZIDE 12.5 MG: 12.5 TABLET ORAL at 08:06

## 2023-06-12 RX ADMIN — MAGNESIUM SULFATE 2 G: 2 INJECTION INTRAVENOUS at 12:06

## 2023-06-12 RX ADMIN — CEFTRIAXONE SODIUM 2 G: 2 INJECTION, POWDER, FOR SOLUTION INTRAMUSCULAR; INTRAVENOUS at 03:06

## 2023-06-12 RX ADMIN — MAGNESIUM SULFATE 2 G: 2 INJECTION INTRAVENOUS at 10:06

## 2023-06-12 NOTE — PLAN OF CARE
Problem: SLP  Goal: SLP Goal  Description: Short Term Goals:  1. Pt will participate in ongoing swallow assessment to determine least restrictive diet.   2. Pt will tolerate FULL liquid diet with no audible s/s of dysphagia and good oral clearance. -- MET  3. Pt will consume 75% of FULL Liquid without overt s/s of aspiration given min assist.  --MET  4. Pt will implement safe swallowing strategies 100% of the time given min assist.   5. Patient will successfully participate in gbyhjo-otxsngft-jludbackv evaluation to further assess for any communication impairments s/p stroke  6. Pt will tolerate advanced trials of PO with no outward s/s of dysphagia.   7. Pt will state personal info with min cues.  8. Pt will perform OMEx with mod cues to increase oral motor coordination for speech and swallowing.   9. Pt will complete written and reading tasks to determine further goals.   10. Patient will tolerate soft consistency/thin liquids po diet with no overt s/s of aspiration.       Outcome: Ongoing, Progressing    [facility].

## 2023-06-12 NOTE — PT/OT/SLP PROGRESS
Speech Language Pathology Treatment    Patient Name:  Dejuan Correa   MRN:  [id number]  Admitting Diagnosis: Cardioembolic stroke    Recommendations:                 General Recommendations:  Dysphagia therapy  Diet recommendations:  Soft & Bite Sized Diet - IDDSI Level 6, Liquid Diet Level: Thin liquids - IDDSI Level 0   Aspiration Precautions: Alternating bites/sips, Frequent oral care, HOB to 90 degrees, Meds whole 1 at a time, and Small bites/sips   General Precautions: Standard, fall, seizure  Communication strategies:  none    Assessment:     eDjuan Correa is a 78 y.o. male with a dx of Cardioembolic stroke who presents with cont mild oral dysphagia, impacted by edentulous status and pt preference to not eat w/ dentures in (dentures are, however, at b/s). ST recs cont current soft & bite sized diet with thin liquids. Meds whole. ST will f/u x1 to ensure diet tolerance.     Subjective     Pt was seated upright in bed, with breakfast tray across table. Pt greeted ST and was agreeable to ST tx session. Pt requesting eggs and sausage heated 2/2 pt only consuming recently heated grits. ST heated pt's breakfast tray, with pt with great appetite and ability to self feed during tx session.    Patient goals: Cont po intake      Pain/Comfort:  Pain Rating 1: 0/10    Respiratory Status: Room air    Objective:     Has the patient been evaluated by SLP for swallowing?   Yes  Keep patient NPO? No   Current Respiratory Status:        The pt required set-up A to consume the following items from his breakfast tray: grits, eggs, sausage, and cup-edge sips of water. Pt with mild prolonged mastication, impacted by pt's preference for no dentures to be in during intake at this time. ST noted pt consuming consecutive bites prior to clearing previous boluses from the oral cavity, as well as the pt consuming large, consecutive cup edge sips. Pt was, however, responsive to verbal cues to clear all previous boluses from the oral  cavity and reduce the volume of sips. Pt demo adequate ability to implement these safe swallowing strategies for remainder of intake. No overt s/s of aspiration noted across consistencies.     Goals:   Multidisciplinary Problems       SLP Goals          Problem: SLP    Goal Priority Disciplines Outcome   SLP Goal     SLP Ongoing, Progressing   Description: Short Term Goals:  1. Pt will participate in ongoing swallow assessment to determine least restrictive diet.   2. Pt will tolerate FULL liquid diet with no audible s/s of dysphagia and good oral clearance. -- MET  3. Pt will consume 75% of FULL Liquid without overt s/s of aspiration given min assist.  --MET  4. Pt will implement safe swallowing strategies 100% of the time given min assist.   5. Patient will successfully participate in yrffad-ulhqball-ctdgkfymt evaluation to further assess for any communication impairments s/p stroke  6. Pt will tolerate advanced trials of PO with no outward s/s of dysphagia.   7. Pt will state personal info with min cues.  8. Pt will perform OMEx with mod cues to increase oral motor coordination for speech and swallowing.   9. Pt will complete written and reading tasks to determine further goals.   10. Patient will tolerate soft consistency/thin liquids po diet with no overt s/s of aspiration.                                Plan:     Patient to be seen:  3 x/week   Plan of Care expires:  07/04/23  Plan of Care reviewed with:  patient   SLP Follow-Up:  Yes       Discharge recommendations:   (TBD)   Barriers to Discharge:  None    Time Tracking:     SLP Treatment Date:   06/12/23  Speech Start Time:  1032  Speech Stop Time:  1050     Speech Total Time (min):  18 min    Billable Minutes: Treatment Swallowing Dysfunction 18    06/12/2023

## 2023-06-12 NOTE — CONSULTS
Wound care consult received for L arm- no open wound noted.  Nurse in agreement and will notify wound care nurse of any other wound/skin concerns.

## 2023-06-12 NOTE — PLAN OF CARE
[facility]    FACILITY TRANSFER ORDERS      Patient Name: Dejuan Correa  YOB: 1945    PCP: Joe De Jesus Jr, MD   PCP Address: [address] / [place] [address]  PCP Phone Number: [phone]  PCP Fax: [phone]    Encounter Date: 06/12/2023    Admit to: [facility]    Vital Signs:  Routine    Diagnoses:   CVA Stroke  Paroxysmal Afib  Hypertension   HLD    Allergies:  Review of patient's allergies indicates:   Allergen Reactions    Heparin analogues        Diet: pureed diet thin liquids    Activities: Activity as tolerated    Goals of Care Treatment Preferences:  Code Status: Full Code      Nursing: Standard nursing care     Labs: at discretion of [facility] physician.    CONSULTS:    Physical Therapy to evaluate and treat. , Occupational Therapy to evaluate and treat., and Speech Therapy to evaluate and treat for Language, Swallowing, and Cognition.    MISCELLANEOUS CARE:  Nothing additional    WOUND CARE ORDERS  None    Medications: Review discharge medications with patient and family and provide education.      Current Discharge Medication List        START taking these medications    Details   aspirin (ECOTRIN) 81 MG EC tablet Take 1 tablet (81 mg total) by mouth once daily.  Qty: 90 tablet, Refills: 3      atorvastatin (LIPITOR) 40 MG tablet Take 1 tablet (40 mg total) by mouth once daily.  Qty: 90 tablet, Refills: 3      cyanocobalamin 1,000 mcg/mL injection Inject 1 mL (1,000 mcg total) into the muscle once a week.      folic acid (FOLVITE) 1 MG tablet Take 1 tablet (1 mg total) by mouth once daily.  Qty: 30 tablet, Refills: 11      levoFLOXacin (LEVAQUIN) 500 MG tablet Take 1 tablet (500 mg total) by mouth once daily. for 2 days  Qty: 2 tablet, Refills: 0           CONTINUE these medications which have NOT CHANGED    Details   carvediloL (COREG) 12.5 MG tablet Take 12.5 mg by mouth Daily.      hydroCHLOROthiazide (HYDRODIURIL) 12.5 MG Tab Take 12.5 mg by mouth once daily.       rivaroxaban (XARELTO ORAL) Take 20 mg by mouth Daily.      valsartan (DIOVAN) 40 MG tablet TAKE 1 TABLET (40 MG TOTAL) BY MOUTH ONCE DAILY. PT STATED TAKING 320MG ONCE DAILY  Qty: 90 tablet, Refills: 1                Immunizations Administered as of 6/12/2023       Name Date Dose VIS Date Route Exp Date    COVID-19, MRNA, LN-S, PF (Pfizer) (Purple Cap) 1/3/2022 0.3 mL -- Intramuscular --    Site: Left arm     : [employer]     Lot: [id number]     COVID-19, MRNA, LN-S, PF (Pfizer) (Purple Cap) 6/11/2021 0.3 mL -- Intramuscular --    Site: Left arm     : Pf[employer] Inc     Lot: [id number]     COVID-19, MRNA, LN-S, PF (Pfizer) (Purple Cap) 5/21/2021 0.3 mL -- Intramuscular --    Site: Left arm     : Pf[employer] Inc     Lot: [id number]           Some patients may experience side effects after vaccination.  These may include fever, headache, muscle or joint aches.  Most symptoms resolve with 24-48 hours and do not require urgent medical evaluation unless they persist for more than 72 hours or symptoms are concerning for an unrelated medical condition.          _________________________________  Davida Cervantes MD  06/12/2023

## 2023-06-12 NOTE — PROGRESS NOTES
"    [facility] Medicine Progress Note    Primary Team: \[facility]\"" Hospitalist Team B  Attending Physician: Reji  Resident: Lisa/Billy  Intern: [place]/Ajay    [place] [employer] Day: 7    Subjective:      Remains conversant and pleasant, oriented fully. No fevers, coughing, SOB.     Objective:   Last 24 Hour Vital Signs:  BP  Min: 131/62  Max: 149/72  Temp  Av.7 °F (36.5 °C)  Min: 97 °F (36.1 °C)  Max: 98.8 °F (37.1 °C)  Pulse  Av.9  Min: 71  Max: 84  Resp  Av  Min: 18  Max: 18  SpO2  Av.8 %  Min: 92 %  Max: 95 %  Height  Av' 11" (180.3 cm)  Min: 5' 11" (180.3 cm)  Max: 5' 11" (180.3 cm)  Weight  Av.4 kg (216 lb 14.9 oz)  Min: 98.4 kg (216 lb 14.9 oz)  Max: 98.4 kg (216 lb 14.9 oz)    Intake/Output Summary (Last 24 hours) at 2023 1248  Last data filed at 2023 0622  Gross per 24 hour   Intake --   Output 150 ml   Net -150 ml        Physical Examination:  Physical Exam  Vitals and nursing note reviewed.   Constitutional:       Appearance: Normal appearance.   HENT:      Head: Normocephalic and atraumatic.      Right Ear: External ear normal.      Left Ear: External ear normal.      Nose: Nose normal. No congestion or rhinorrhea.      Mouth/Throat:      Mouth: Mucous membranes are moist.      Pharynx: Oropharynx is clear.   Eyes:      Pupils: Pupils are equal, round, and reactive to light.   Cardiovascular:      Rate and Rhythm: Normal rate and regular rhythm.   Pulmonary:      Effort: Pulmonary effort is normal.      Breath sounds: Normal breath sounds. No wheezing or rales.   Abdominal:      General: Abdomen is flat. Bowel sounds are normal.      Palpations: Abdomen is soft.   Musculoskeletal:      Right lower leg: No edema.      Left lower leg: No edema.   Skin:     Capillary Refill: Capillary refill takes less than 2 seconds.      Findings: No erythema or rash.   Neurological:      Mental Status: He is alert.   Psychiatric:         Mood and Affect: Mood normal.         Behavior: " Behavior normal.         Laboratory:  Recent Labs   Lab 06/06/23  0439 06/08/23  0346 06/09/23  0732 06/09/23  0733 06/10/23  0508 06/11/23  0513 06/12/23  0413   WBC 9.58 16.83* 13.61*  --   --   --  10.04   HGB 13.0* 12.5* 11.9*  --   --   --  11.7*   HCT 40.6 38.9* 36.9*  --   --   --  35.7*    264 243  --   --   --  331   * 102* 101*  --   --   --  98   RDW 13.4 13.5 13.6  --   --   --  13.9    143  --  140 141 140 139   K 4.1 3.9  --  3.4* 3.9 3.7 3.6    101  --  101 102 101 102   CO2 23 27  --  27 26 32* 26   BUN 13 22  --  23 21 21 19   CREATININE 1.1 1.3  --  1.4 1.1 1.2 1.1   GLU 88 111*  --  111* 107 125* 124*   PROT 6.9 7.2  --  6.6 6.7 6.8 6.6   ALBUMIN 3.2* 3.2*  --  2.8* 2.8* 2.8* 2.8*   BILITOT 1.6* 1.8*  --  1.7* 1.2* 0.9 0.9   AST 17 18  --  14 16 16 18   ALKPHOS 95 99  --  80 85 80 85   ALT 7* <5*  --  <5* <5* 5* <5*       I have personally reviewed the above laboratory studies.     Imaging:  EKG (my interpretation): No new today    US Lower Extremity Veins Bilateral   Final Result      No evidence of deep venous thrombosis in either lower extremity.         Electronically signed by: Bud Hawk MD   Date:    06/11/2023   Time:    13:47      X-Ray Chest AP Portable   Final Result      Slight obscuration of the left diaphragmatic border when compared to radiograph dated 05/26/2023, possibly related to adjacent atelectasis or developing consolidation.  Follow-up radiograph recommended.         Electronically signed by: Juan Manuel Barnes MD   Date:    06/08/2023   Time:    08:25       Carotid Bilateral   Final Result      No evidence of a hemodynamically significant carotid bifurcation stenosis.         Electronically signed by: Hipolito Paul Jr   Date:    06/07/2023   Time:    15:02      Fl Modified Barium Swallow Speech   Final Result      As above.  Please see documentation from the speech pathologist for additional findings and recommendations.          Electronically signed by: Enmanuel Bell   Date:    06/06/2023   Time:    14:26      CTA Head and Neck (xpd)   Final Result      1. Unremarkable CTA head and neck.  No large vessel occlusion or high-grade stenosis.   2. Atherosclerotic disease of the bilateral carotid bifurcations, and the bilateral intracranial ICAs and vertebral arteries as above.   3. Known acute infarctions better evaluated on recently performed MRI.   4. Senescent changes and chronic microvascular ischemic changes.   5. Pulmonary artery dilation which can be seen with pulmonary hypertension.         Electronically signed by: Gurdeep Cho   Date:    06/05/2023   Time:    18:39      MRI Brain Without Contrast   Final Result   Abnormal      1. Multiple small acute infarctions in the left cerebellar hemisphere and in the right corona radiata.   2. Senescent changes and chronic microvascular ischemic changes.   This report was flagged in Epic as abnormal.      Dr. Gurdeep Cho discussed critical findings with Dr. Nyasia Cho by telephone at 19:04 on 06/04/2023.         Electronically signed by: Gurdeep Cho   Date:    06/04/2023   Time:    19:07      CT Head Without Contrast   Final Result      Small hypodensity in the left cerebellar hemisphere compatible with an age-indeterminate lacunar infarction.  Otherwise no acute intracranial abnormality.  Further evaluation with MRI as clinically indicated.         Electronically signed by: Gurdeep Cho   Date:    06/04/2023   Time:    17:06          Current Medications:  Scheduled:   aspirin  81 mg Oral Daily    atorvastatin  40 mg Oral Daily    carvediloL  12.5 mg Oral Daily    cefTRIAXone (ROCEPHIN) IVPB  2 g Intravenous Q24H    cyanocobalamin  1,000 mcg Intramuscular Weekly    folic acid  1 mg Oral Daily    hydroCHLOROthiazide  12.5 mg Oral Daily    magnesium sulfate IVPB  2 g Intravenous Q2H    rivaroxaban  20 mg Oral Daily with dinner    valsartan  40 mg Oral Daily      Infusions:    PRN:  acetaminophen, dextrose 10%, dextrose 10%, dextrose, dextrose, glucagon (human recombinant), insulin aspart U-100, pneumoc 20-richmond conj-dip cr(PF), sodium chloride 0.9%    Assessment:     Dejuan Correa is a 78 y.o.  male who has a past medical history of paroxysmal Atrial fibrillation, Supraventricular tachycardia, HTN, HLD, and alcohol use disorder with hx of complicated withdrawal. The patient presented to [facility] on 6/4/2023 with a primary complaint of slurred speech and left sided weakness the afternoon of admission, found to have multiple small acute L cerebellar and R corona radiata infarcts on MRI brain. Neurology consulted, appreciate recs. Xarelto restarted. Found to have likely aspiration pneumonitis vs pneumonia 6/8, began on antibiotics with clinical improvement and in leukocytosis. Therapies recomending IPR for patient.    Plan:     Acute cerebellar CVA  - slurred speech + mild L sided weakness, L pronator drift in patient on Xarelto for pAfib  - NIH scale:  - not TPA candidate, on xarelto for P-afib  - CT head: age indeterminate hypodensity L cerebellum  - MRI brain: multiple small acute infarctions in the left cerebellar hemisphere and in the right corona radiata.  - multifocal CVA suggests embolic etiology- has rate controlled P-afib adherent to Xarelto  - TIA ABCD2 score = 6  - CTA without LVO or high grade stenosis; atherosclerotic disease of carotids bilaterally   - L-Neurology on board  - A1c 5.2, TSH wnl, B12 263, Folate 3.2  - Lipid panel with HDL 31, LDL 68  - Xarelto restarted per neuro recs on 6/6/23  - Carotid US w/o significant stenosis   Plan:  - Neurology recs  - Frequent neuro-checks (q4h)  - Head of bed > 30 degrees for aspiration prevention and aspiration precautions ordered.  - Continue Atorvastatin 40 mg daily (long-term goal LDL < 70)  - Continue ASA 81 daily  - Continue Xarelto 20mg daily  - Stroke education and counseling  - Physical  therapy, occupational therapy, speech therapy   - Recommend IPR   - Full pureed diet (IDDSI Level 4) with Nectar thick fluids    Leukocytosis  - WBC 16.83 on 06/8/23-->13.61  - Afebrile, benign physical exam  - CXR with possible consolidation  - UA negative  - Ceftriaxone and azithromycin initiated   Plan:  - Continue abx with transition to PO w/ discharge     Paroxysmal A-fib  - reported adherent to coreg and xarelto, multifocal CVA raises concern for thromboembolic source  - NS with normal rate since admission   - resumed xarelto 6/6/23 per neuro recs  Plan:  - Monitor on telemetry  - Continue coreg 12.5  - Continue Xarelto 20mg daily     HTN   - home meds: coreg 12.5 daily, HCTZ 12.5, valsartan 40   Plan:  - Continue home medications     HLD  - not on statin  - Lipid panel with HDL 31, LDL 68  Plan:  -Atorvastatin 40 mg daily (long-term goal LDL < 70)     Severe alcohol use disorder in early remission with hx complicated withdrawal  - reports no alcohol use since hospital discharge (last drink >7 days)  - ethanol <10 on admission  - no clinical evidence of acute withdrawal at this time  Plan:  - monitor on tele, CIWA Q6     Macrocytic anemia  - mild anemia, MCV>100, likely 2/2 alcohol use   - B12 and folate deficient  Plan:  - B12 1000mcg IM weekly for 4-weeks  - Folate 1mg PO daily      Diet: Diet Dysphagia Soft (IDDSI Level 6) Thin   DVT Prophylaxis: SCDs until neuro eval for LMWH  Code: Full Code     Social: daughter vineet updated over phone, brother updated at bedside  Dispo: Pending IPR acceptance    Davida Cervantes MD  [employer] IM/Peds PGY3/HO2    [facility] Hospitalist Pager numbers:   [facility] Hospitalist Medicine Team A (Anila/Priscilla): [phone]  [facility] Hospitalist Medicine Team B (Reji/Samina):  [phone]

## 2023-06-12 NOTE — PLAN OF CARE
Recommendation:   1. Continue current diet with texture/consistency modifications per SLP.   2. Consider adding Low Na/Cardiac restriction to diet once advanced.   3. RD will monitor need for supplementation.   4. Monitor weight/labs.   5. RD to follow and monitor nutrition status    Goals:   Pt intake >/= 75% EEN/EPN by RD follow up    Nutrition Goal Status: new  Communication of RD Recs: other (comment) (POC)      Problem: Swallowing Impairment (Stroke, Ischemic/Transient Ischemic Attack)  Goal: Optimal Eating and Swallowing without Aspiration  Outcome: Ongoing, Progressing

## 2023-06-12 NOTE — PLAN OF CARE
Patient seen for physical therapy session on this date.  Patient states he is fatigued from working with OT, but agreeable to participate.  Patient is making good progress toward PT goals.  Patient continues to be an excellent candidate for inpatient rehabilitation.  Patient was living in community setting functioning at independent level for ADLs, and mobility prior to this event.  There is an expectation of returning to prior level of function to maintain independence thus avoiding readmission.  Patient's clinical condition meets full Inpatient Rehab (IPR) criteria; including the ability to actively participate in 3 hours of therapy.  A lower level of care(SNF) cannot provide the interdisciplinary treatment approach needed.     Problem: Physical Therapy  Goal: Physical Therapy Goal  Description: Goals to be met by: 2023     Patient will increase functional independence with mobility by performin. Supine to sit with MInimal Assistance  2. Sit to supine with MInimal Assistance  3. Rolling to Left and Right with Minimal Assistance.  4. Sit to stand transfer with Minimal Assistance  5. Bed to chair transfer with Minimal Assistance using Rolling Walker  6. Gait  x 10 feet with Maximum Assistance using LRAD.  7. Stand for 3 minutes with Moderate Assistance using LRAD to perform a functional activity.      Outcome: Ongoing, Progressing

## 2023-06-12 NOTE — PROGRESS NOTES
call placed to Amira with [facility] to check on status of auth - she isn't aware of approval - she will call and check with her insurance person.    ELLEN called [facility] at    ref #  [id number] -- spoke with Usman --  this request is still pending as of 9:21 am.       ELLEN spoke with with Fernanda in intake for [facility] - pt's info has been forwarded to Makeda and Nydia at [facility].     ELELN called [facility] - both Makeda and Nydia are in a meeting until 10am     -----------------------------  11:11 am  - ELLEN spoke with Chuy at [facility] - [phone] -- she told CM a message was left for Janie at [facility] requesting more clinicals - - Amira advised per Secure Chat

## 2023-06-12 NOTE — ASSESSMENT & PLAN NOTE
Diagnosis:  Inadequate energy intake    Related to (etiology):   cardioembolic stroke    Signs and Symptoms (as evidenced by):   Pt admitted with cardioembolic stroke, resulting in difficulty swallowing-currently working with SLP and on dysphagia soft diet.     Interventions  Collaboration with other providers     Nutrition Diagnosis Status:   New

## 2023-06-12 NOTE — PLAN OF CARE
Continue OT POC. Moderate progression noted. Improved bed mobility and sitting balance EOB as evidenced by minimal assist x1 person for supine to sit and stand by assist for sitting EOB. Met self-feeding goal. Deficits in sit to stand and static standing balance as noted by moderate assist of two persons for 2 stands with RW and 2 stands with no assistive device; grimacing noted due to pain in left knee and ankle. Recommending high intensity occupational therapy.     Problem: Occupational Therapy  Goal: Occupational Therapy Goal  Description: Goals to be met by: 7/3/2023     Patient will increase functional independence with ADLs by performing:    Self feeding with distant supervision in upright seated position with adaptations as needed (goal added 6/6/2023). Goal Met.  LE Dressing with Contact Guard Assistance with increased time. (downgrade goal to moderate assist 6/8/2023)  Toileting from toilet with Modified independence and increased time for hygiene and clothing management. (Downgrade goal to moderate assist 6/8/2023)  Step transfer with Stand-by Assistance and AD as needed. (Downgrade goal to moderate assist 6/8/2023)  Toilet transfer to toilet with Stand-by Assistance and AD as needed. (Downgrade goal to moderate assist to drop arm commode 6/8/2023)  Increase LUE proximal strength grossly to 3+/5.  100% reciprocation for patient education, CVA/BEFAST education, ADL/Mobility modifications, and visual scanning as appropriate.     Outcome: Ongoing, Progressing

## 2023-06-12 NOTE — PT/OT/SLP PROGRESS
Physical Therapy Treatment    Patient Name:  Dejuan Correa   MRN:  [id number]    Recommendations:     Discharge Recommendations: other (see comments) (High Intensity Therapy)  Discharge Equipment Recommendations: to be determined by next level of care  Barriers to discharge:  currently requiring mod to max assist for all functional mobility    Assessment:     Dejuan Correa is a 78 y.o. male admitted with a medical diagnosis of Cardioembolic stroke.  He presents with the following impairments/functional limitations: weakness, visual deficits, impaired endurance, impaired self care skills, decreased upper extremity function, impaired fine motor, impaired functional mobility, decreased lower extremity function, gait instability, decreased safety awareness, impaired balance, pain     Patient seen for physical therapy session on this date.  Patient states he is fatigued from working with OT, but agreeable to participate.  Patient is making good progress toward PT goals.  Patient continues to be an excellent candidate for inpatient rehabilitation.  Patient was living in community setting functioning at independent level for ADLs, and mobility prior to this event.  There is an expectation of returning to prior level of function to maintain independence thus avoiding readmission.  Patient's clinical condition meets full Inpatient Rehab (IPR) criteria; including the ability to actively participate in 3 hours of therapy.  A lower level of care(SNF) cannot provide the interdisciplinary treatment approach needed.     Rehab Prognosis: Good; patient would benefit from acute skilled PT services to address these deficits and reach maximum level of function.    Recent Surgery: * No surgery found *      Plan:     During this hospitalization, patient to be seen 6 x/week to address the identified rehab impairments via gait training, therapeutic activities, therapeutic exercises, neuromuscular re-education and progress toward the  "following goals:    Plan of Care Expires:  07/05/23    Subjective     Chief Complaint: "I am tired - I just worked with OT, but I'll do it"  Patient/Family Comments/goals: To get stronger and return home  Pain/Comfort:  Pain Rating 1: 6/10  Location - Side 1: Left  Location - Orientation 1: generalized  Location 1: ankle  Pain Addressed 1: Nurse notified, Distraction, Reposition  Pain Rating Post-Intervention 1: 6/10      Objective:     Communicated with nurse Richter prior to session.  Patient found supine with bed alarm, Condom Catheter, peripheral IV, telemetry upon PT entry to room.     General Precautions: Standard, fall  Orthopedic Precautions: N/A  Braces: N/A  Respiratory Status: Room air     Functional Mobility:  Bed Mobility:     Rolling Left:  minimum assistance  Scooting: moderate assistance  Supine to Sit: moderate assistance  Sit to Supine: moderate assistance  Transfers:     Sit to Stand:  x 4 trials with max assist with RW, emphasis on increasing upright posture, NMR to L LE to promote knee and hip extension.  Tolerates standing x ~ 1-2 minutes with max assist, emphasis on increasing WB on L LE for NMR  Pre-Gait:  Standing:  lateral weight shifts, able to lift R LE minimally while therapist blocking L knee, tolerates ~ 3-4 trials with max assist  Balance: Seated:  EOB, SBA, midline   Standing:  requires RW, poor - requires max assist, Left lean      AM-PAC 6 CLICK MOBILITY  Turning over in bed (including adjusting bedclothes, sheets and blankets)?: 3  Sitting down on and standing up from a chair with arms (e.g., wheelchair, bedside commode, etc.): 2  Moving from lying on back to sitting on the side of the bed?: 2  Moving to and from a bed to a chair (including a wheelchair)?: 2  Need to walk in hospital room?: 2  Climbing 3-5 steps with a railing?: 1  Basic Mobility Total Score: 12       Treatment & Education:  Patient agreeable to PT session, despite fatigue.  Functional mobility as above.  Patient " requiring max VCs to attend to Left UE and LE activities.  After session above, patient returns to supine and performs B LE exercises/NMR with emphasis on strength training to L LE.   Patient able to assist and use L LE in functional tasks, but requires max verbal and tactile cues to attend to left side.      Patient left supine with all lines intact, call button in reach, bed alarm on, and nurse notified..    GOALS:   Multidisciplinary Problems       Physical Therapy Goals          Problem: Physical Therapy    Goal Priority Disciplines Outcome Goal Variances Interventions   Physical Therapy Goal     PT, PT/OT Ongoing, Progressing     Description: Goals to be met by: 2023     Patient will increase functional independence with mobility by performin. Supine to sit with MInimal Assistance  2. Sit to supine with MInimal Assistance  3. Rolling to Left and Right with Minimal Assistance.  4. Sit to stand transfer with Minimal Assistance  5. Bed to chair transfer with Minimal Assistance using Rolling Walker  6. Gait  x 10 feet with Maximum Assistance using LRAD.  7. Stand for 3 minutes with Moderate Assistance using LRAD to perform a functional activity.                           Time Tracking:     PT Received On: 23  PT Start Time: 1134     PT Stop Time: 1203  PT Total Time (min): 29 min     Billable Minutes: Therapeutic Activity 15 and Neuromuscular Re-education 14    Treatment Type: Treatment  PT/PTA: PT     Number of PTA visits since last PT visit: 0     2023

## 2023-06-12 NOTE — DISCHARGE SUMMARY
[facility] Hospital Medicine Discharge Summary    Primary Team: [facility] Hospitalist Team B  Attending Physician: Job Mendoza MD  Resident: Lisa  Intern: Jermaine    Date of Admit: 6/4/2023  Date of Discharge: 6/12/2023    Discharge to: [facility]  Condition: Fair    Discharge Diagnoses     Patient Active Problem List   Diagnosis    PAF (paroxysmal atrial fibrillation)    PSVT (paroxysmal supraventricular tachycardia)    Primary hypertension    HLD (hyperlipidemia)    Alcohol use disorder, severe, dependence    Alcohol withdrawal seizure with delirium    Cardioembolic stroke       Consultants and Procedures     Consultants:  Wound Care  Nutrition  Neurology    Procedures:   None    Imaging:  CT Head  MRI Brain  CTA Head/Neck  MBSS  US Carotid  CXRR  LE US    Brief History of Present Illness   Mr. Dejuan Correa is a 78 y.o.  male who has a past medical history of paroxysmal Atrial fibrillation, Supraventricular tachycardia, HTN, HLD, and alcohol use disorder with hx of withdrawal who patient presented to [facility] on 6/4/2023 with a primary complaint of slurred speech and left sided weakness that was first noticed by his son at around 2 pm the day of admission. He was last seen normal 1 hour prior to symptom onset.      For the full HPI please refer to the History & Physical from this admission.    Hospital Course By Problem with Pertinent Findings   Acute cerebellar CVA  - Slurred speech + mild L sided weakness, L pronator drift in patient on Xarelto for pAfib  - not TPA candidate, on xarelto for P-afib  - CT head: age indeterminate hypodensity L cerebellum  - MRI brain: multiple small acute infarctions in the left cerebellar hemisphere and in the right corona radiata.  - multifocal CVA suggests embolic etiology- has rate controlled P-afib adherent to Xarelto  - CTA without LVO or high grade stenosis; atherosclerotic disease of carotids bilaterally   - A1c 5.2, TSH wnl, B12 263, Folate 3.2  - Lipid panel with  HDL 31, LDL 68  - Xarelto restarted per neuro recs on 6/6/23  - Carotid US w/o significant stenosis   Plan:  - Head of bed > 30 degrees for aspiration prevention and aspiration precautions ordered.  - Continue Atorvastatin 40 mg daily (long-term goal LDL < 70)  - Continue ASA 81 daily  - Continue Xarelto 20mg daily  - Stroke education and counseling  - Placement with IPR, dysphagia diet soft (IDDSI Lvl 6)     CAP  - WBC 16.83 on 06/8/23- with new oxygen demand  - Afebrile, benign physical exam  - CXR with possible consolidation  - UA negative  - Ceftriaxone and azithromycin initiated   Plan:  - Continue abx for two more days with Levaquin on discharge     Paroxysmal A-fib  - reported adherent to coreg and xarelto, multifocal CVA raises concern for thromboembolic source  - NSR with normal rate since admission   - resumed xarelto 6/6/23 per neuro recs  Plan:  - Continue coreg 12.5  - Continue Xarelto 20mg daily     HTN   - Continue home meds: coreg 12.5 daily, HCTZ 12.5, valsartan 40      HLD  -Atorvastatin 40 mg daily (long-term goal LDL < 70)     Severe alcohol use disorder in early remission with hx complicated withdrawal  - reports no alcohol use since hospital discharge (last drink >7 days PTA)  - ethanol <10 on admission  - No sxs of withdrawal this admission     Macrocytic anemia  - mild anemia, MCV>100, likely 2/2 alcohol use   - B12 and folate deficient  - B12 1000mcg IM weekly for 4-weeks  - Folate 1mg PO daily    Discharge Medications        Medication List        START taking these medications      aspirin 81 MG EC tablet  Commonly known as: ECOTRIN  Take 1 tablet (81 mg total) by mouth once daily.     atorvastatin 40 MG tablet  Commonly known as: LIPITOR  Take 1 tablet (40 mg total) by mouth once daily.     cyanocobalamin 1,000 mcg/mL injection  Inject 1 mL (1,000 mcg total) into the muscle once a week.  Start taking on: June 14, 2023     folic acid 1 MG tablet  Commonly known as: FOLVITE  Take 1 tablet  (1 mg total) by mouth once daily.     levoFLOXacin 500 MG tablet  Commonly known as: LEVAQUIN  Take 1 tablet (500 mg total) by mouth once daily. for 2 days  Start taking on: June 13, 2023            CONTINUE taking these medications      carvediloL 12.5 MG tablet  Commonly known as: COREG     hydroCHLOROthiazide 12.5 MG Tab  Commonly known as: HYDRODIURIL     valsartan 40 MG tablet  Commonly known as: DIOVAN  TAKE 1 TABLET (40 MG TOTAL) BY MOUTH ONCE DAILY. PT STATED TAKING 320MG ONCE DAILY     XARELTO ORAL               Where to Get Your Medications        Information about where to get these medications is not yet available    Ask your nurse or doctor about these medications  aspirin 81 MG EC tablet  atorvastatin 40 MG tablet  cyanocobalamin 1,000 mcg/mL injection  folic acid 1 MG tablet  levoFLOXacin 500 MG tablet         Discharge Information:   Diet:  Diet dysphagia soft    Physical Activity:  Per PT/OT             Instructions:  1. Take all medications as prescribed  2. Keep all follow-up appointments  3. Return to the hospital or call your primary care physicians if any worsening symptoms such as fever, chest pain, shortness of breath, return of symptoms, or any other concerns.    Follow-Up Appointments:  Per IPR    Emiliano Gonzalez MD  [facility] Internal Medicine, -III

## 2023-06-12 NOTE — CONSULTS
"  [facility] - Telemetry  Adult Nutrition  Consult Note    SUMMARY     Recommendations    Recommendation:   1. Continue current diet with texture/consistency modifications per SLP.   2. Consider adding Low Na/Cardiac restriction to diet once advanced.   3. RD will monitor need for supplementation.   4. Monitor weight/labs.   5. RD to follow and monitor nutrition status    Goals:   Pt intake >/= 75% EEN/EPN by RD follow up    Nutrition Goal Status: new  Communication of RD Recs: other (comment) (POC)    Assessment and Plan    Neuro  * Cardioembolic stroke  Diagnosis:  Inadequate energy intake    Related to (etiology):   cardioembolic stroke    Signs and Symptoms (as evidenced by):   Pt admitted with cardioembolic stroke, resulting in difficulty swallowing-currently working with SLP and on dysphagia soft diet.     Interventions  Collaboration with other providers     Nutrition Diagnosis Status:   New         Reason for Assessment    Reason For Assessment: consult (wound healing)  Diagnosis: stroke/CVA (cardioembolic stroke)  Relevant Medical History: HTN, irregular heart beat, a fib, supraventricular tachycardia, ETOH abuse in remission  Interdisciplinary Rounds: did not attend  General Information Comments: Pt recieving dysphagia soft diet with thin liquids, 25-50% intake recorded. PIV, Tino Score 16: altered skin integrity left upper arm NFPE not completed 2/2 remote weekend coverage  Nutrition Discharge Planning: d/c to be determined    Nutrition Risk Screen    Nutrition Risk Screen: no indicators present    Nutrition/Diet History    Food Preferences: JAG  Spiritual, Cultural Beliefs, Uatsdin Practices, Values that Affect Care: no  Food Allergies: NKFA  Factors Affecting Nutritional Intake: difficulty/impaired swallowing    Anthropometrics    Temp: 97.3 °F (36.3 °C)  Height Method: Stated  Height: 5' 11" (180.3 cm)  Height (inches): 71 in  Weight Method: Bed Scale  Weight: 98.4 kg (216 lb 14.9 oz)  Weight (lb): " 216.93 lb  Ideal Body Weight (IBW), Male: 172 lb  % Ideal Body Weight, Male (lb): 126.12 %  BMI (Calculated): 30.3  BMI Grade: 30 - 34.9- obesity - grade I       Lab/Procedures/Meds    Pertinent Labs Reviewed: reviewed  Pertinent Labs Comments: CO2 32, Glu 125, Alb 2.8, ALT 5  Pertinent Medications Reviewed: reviewed  Pertinent Medications Comments: aspirin, statin, carvedilol, rocephin, cyanocobalamin, folic acid, hydrochlorothiazide, rivaroxaban, valsartan      Estimated/Assessed Needs    Weight Used For Calorie Calculations: 98.4 kg (216 lb 14.9 oz)  Energy Calorie Requirements (kcal): 2071  Energy Need Method: Warrens-St Jeor (MSJ x1.2)  Protein Requirements: 98 (1.0 gm/kg)  Weight Used For Protein Calculations: 98.4 kg (216 lb 14.9 oz)     Estimated Fluid Requirement Method: RDA Method (or PER MD)  RDA Method (mL): 2071         Nutrition Prescription Ordered    Current Diet Order: Dysphagia soft thin liquids    Evaluation of Received Nutrient/Fluid Intake    I/O: -300  Energy Calories Required: not meeting needs  Protein Required: not meeting needs  Fluid Required: not meeting needs  Comments: LBM 6/10  % Intake of Estimated Energy Needs: 25 - 50 %  % Meal Intake: 25 - 50 %    Nutrition Risk    Level of Risk/Frequency of Follow-up:  (2x/week)       Monitor and Evaluation    Food and Nutrient Intake: energy intake, food and beverage intake  Food and Nutrient Adminstration: diet order  Knowledge/Beliefs/Attitudes: food and nutrition knowledge/skill  Physical Activity and Function: nutrition-related ADLs and IADLs  Anthropometric Measurements: weight, weight change, body mass index  Biochemical Data, Medical Tests and Procedures: electrolyte and renal panel, gastrointestinal profile, glucose/endocrine profile, inflammatory profile, lipid profile  Nutrition-Focused Physical Findings: overall appearance       Nutrition Follow-Up    RD Follow-up?: Yes

## 2023-06-12 NOTE — PLAN OF CARE
"CM rec'd call from Anh with [facility]  -   Pt approved for IPR  - Auth # [id number]   Christian clarke - Stretcher set up for "now"   Nurse to call report to [phone] or [phone] when transportation arrives per Amira's request.       CM called both pt's daughter and brother to advise them of transfer     Daughter Alejandra  [phone];  Brother Usman            06/12/23 1548   Final Note   Assessment Type Final Discharge Note   Anticipated Discharge Disposition Rehab  ([facility])   What phone number can be called within the next 1-3 days to see how you are doing after discharge? [phone]   Hospital Resources/Appts/Education Provided Appointments scheduled and added to AVS   Post-Acute Status   Post-Acute Authorization Placement   Post-Acute Placement Status Set-up Complete/Auth obtained   Discharge Delays None known at this time       "

## 2023-06-13 ENCOUNTER — EXTERNAL HOME HEALTH (OUTPATIENT)
Dept: HOME HEALTH SERVICES | Facility: HOSPITAL | Age: 78
End: 2023-06-13
Payer: MEDICARE

## 2023-06-14 NOTE — PHYSICIAN QUERY
PT Name: Dejuan Correa  MR #: [id number]     DOCUMENTATION CLARIFICATION     CDS/: Scar Roman RN, BSN   Contact information: [email or web address]     This form is a permanent document in the medical record.    Query Date: 2023    By submitting this query, we are merely seeking further clarification of documentation.  Please utilize your independent clinical judgment when addressing the question(s) below.  The Medical Record contains the following:   Indicators   Supporting Clinical Findings Location in Medical Record   X Pneumonia documented Found to have likely aspiration pneumonitis vs pneumonia , began on antibiotics with clinical improvement and in leukocytosis.          CAP  - WBC 16.83 on 23- with new oxygen demand  - Afebrile, benign physical exam     Internal Medicine PN 6/10/2023              Internal Medicine Discharge Summary 2023   X Chest X-Ray/CT Scan CXR with possible consolidation Internal Medicine Discharge Summary 2023     X PaO2    PaCO2     O2 sat   Last 24 Hour Vital Signs:  SpO2  Av.8 %  Min: 92 %  Max: 95 %      Internal Medicine  PN 2023   X WBC   Lab 23  0439 23  0346 23  0732 23  0733 06/10/23  0508 23  0513 23  0413   WBC 9.58 16.83* 13.61*  --   --   --  10.04      Internal Medicine  PN 2023             X Vital Signs   Last 24 Hour Vital Signs:  BP  Min: 130/91  Max: 148/67  Temp  Av.7 °F (36.5 °C)  Min: 97.1 °F (36.2 °C)  Max: 98.4 °F (36.9 °C)  Pulse  Av.9  Min: 80  Max: 95  Resp  Av  Min: 16  Max: 18  SpO2  Av.4 %  Min: 94 %  Max: 98 %        Internal Medicine PN 6/10/2023    Cultures      X Treatment    - Ceftriaxone and azithromycin initiated   Plan:  - Continue abx for two more days with Levaquin on discharge     Internal Medicine Discharge Summary 2023    Supplemental O2     X Dysphagia/Swallow study   No overt s/s of aspiration noted across consistencies.      Recommendations:                  General Recommendations:  Dysphagia therapy  Diet recommendations:  Soft & Bite Sized Diet - IDDSI Level 6, Liquid Diet Level: Thin liquids - IDDSI Level 0   Aspiration Precautions: Alternating bites/sips, Frequent oral care, HOB to 90 degrees, Meds whole 1 at a time, and Small bites/sips   General Precautions: Standard, fall, seizure      Speech Therapy PN 6/12/2023      Other       Provider, please clarify the Pneumonia diagnosis related to the above clinical indicators:    [ X  ] CAP (Community Acquired Pneumonia)     [   ] Aspiration Pneumonia     [   ] Unspecified Pneumonia     [   ] Other type of pneumonia (please specify): ________     [   ] Other respiratory diagnosis (please specify): _________     [  ] Clinically undetermined         Please document in your progress notes daily for the duration of treatment, until resolved, and include in your discharge summary.     Form No. [id number]

## 2023-06-29 ENCOUNTER — TELEPHONE (OUTPATIENT)
Dept: CARDIOLOGY | Facility: CLINIC | Age: 78
End: 2023-06-29
Payer: MEDICARE

## 2023-06-29 NOTE — TELEPHONE ENCOUNTER
Returned caller call, and I spoke to patient brother,    He stated no one from the family called due to Patient     has been in the Hospital for 3 weeks and still admitted.    I asked him to call us back if they still need our Help.        Nw                                ----- Message from Jessica Del Toro sent at 6/29/2023 10:39 AM CDT -----  Type:  Sooner Apoointment Request    Caller is requesting a sooner appointment.  Caller declined first available appointment listed below.  Caller will not accept being placed on the waitlist and is requesting a message be sent to doctor.  Name of Caller: [facility]  When is the first available appointment? No template to sched   Symptoms:  Would the patient rather a call back or a response via [facility]?   Best Call Back Number:[phone]  Additional Information:

## 2023-07-18 ENCOUNTER — OUTSIDE PLACE OF SERVICE (OUTPATIENT)
Dept: ADMINISTRATIVE | Facility: OTHER | Age: 78
End: 2023-07-18
Payer: MEDICARE

## 2023-07-18 PROCEDURE — 99305 1ST NF CARE MODERATE MDM 35: CPT | Mod: ,,, | Performed by: FAMILY MEDICINE

## 2023-07-18 PROCEDURE — 99305 PR NURSING FACILITY CARE, INIT, MOD SEVERITY: ICD-10-PCS | Mod: ,,, | Performed by: FAMILY MEDICINE

## 2023-08-01 ENCOUNTER — OUTSIDE PLACE OF SERVICE (OUTPATIENT)
Dept: ADMINISTRATIVE | Facility: OTHER | Age: 78
End: 2023-08-01
Payer: MEDICARE

## 2023-08-01 PROCEDURE — 99308 SBSQ NF CARE LOW MDM 20: CPT | Mod: ,,, | Performed by: FAMILY MEDICINE

## 2023-08-01 PROCEDURE — 99308 PR NURSING FAC CARE, SUBSEQ, MINOR COMPLIC: ICD-10-PCS | Mod: ,,, | Performed by: FAMILY MEDICINE

## 2023-08-02 ENCOUNTER — OFFICE VISIT (OUTPATIENT)
Dept: CARDIOLOGY | Facility: CLINIC | Age: 78
End: 2023-08-02
Payer: MEDICARE

## 2023-08-02 VITALS
SYSTOLIC BLOOD PRESSURE: 135 MMHG | DIASTOLIC BLOOD PRESSURE: 84 MMHG | WEIGHT: 209 LBS | OXYGEN SATURATION: 95 % | BODY MASS INDEX: 29.26 KG/M2 | HEART RATE: 100 BPM | HEIGHT: 71 IN

## 2023-08-02 DIAGNOSIS — I48.0 PAF (PAROXYSMAL ATRIAL FIBRILLATION): ICD-10-CM

## 2023-08-02 DIAGNOSIS — I63.9 CEREBELLAR STROKE: Primary | ICD-10-CM

## 2023-08-02 DIAGNOSIS — I10 PRIMARY HYPERTENSION: ICD-10-CM

## 2023-08-02 PROCEDURE — 99204 OFFICE O/P NEW MOD 45 MIN: CPT | Mod: S$GLB,,, | Performed by: INTERNAL MEDICINE

## 2023-08-02 PROCEDURE — 1125F AMNT PAIN NOTED PAIN PRSNT: CPT | Mod: CPTII,S$GLB,, | Performed by: INTERNAL MEDICINE

## 2023-08-02 PROCEDURE — 3075F SYST BP GE 130 - 139MM HG: CPT | Mod: CPTII,S$GLB,, | Performed by: INTERNAL MEDICINE

## 2023-08-02 PROCEDURE — 1100F PTFALLS ASSESS-DOCD GE2>/YR: CPT | Mod: CPTII,S$GLB,, | Performed by: INTERNAL MEDICINE

## 2023-08-02 PROCEDURE — 3288F PR FALLS RISK ASSESSMENT DOCUMENTED: ICD-10-PCS | Mod: CPTII,S$GLB,, | Performed by: INTERNAL MEDICINE

## 2023-08-02 PROCEDURE — 1125F PR PAIN SEVERITY QUANTIFIED, PAIN PRESENT: ICD-10-PCS | Mod: CPTII,S$GLB,, | Performed by: INTERNAL MEDICINE

## 2023-08-02 PROCEDURE — 3288F FALL RISK ASSESSMENT DOCD: CPT | Mod: CPTII,S$GLB,, | Performed by: INTERNAL MEDICINE

## 2023-08-02 PROCEDURE — 3079F PR MOST RECENT DIASTOLIC BLOOD PRESSURE 80-89 MM HG: ICD-10-PCS | Mod: CPTII,S$GLB,, | Performed by: INTERNAL MEDICINE

## 2023-08-02 PROCEDURE — 1100F PR PT FALLS ASSESS DOC 2+ FALLS/FALL W/INJURY/YR: ICD-10-PCS | Mod: CPTII,S$GLB,, | Performed by: INTERNAL MEDICINE

## 2023-08-02 PROCEDURE — 1159F PR MEDICATION LIST DOCUMENTED IN MEDICAL RECORD: ICD-10-PCS | Mod: CPTII,S$GLB,, | Performed by: INTERNAL MEDICINE

## 2023-08-02 PROCEDURE — 1157F ADVNC CARE PLAN IN RCRD: CPT | Mod: CPTII,S$GLB,, | Performed by: INTERNAL MEDICINE

## 2023-08-02 PROCEDURE — 99204 PR OFFICE/OUTPT VISIT, NEW, LEVL IV, 45-59 MIN: ICD-10-PCS | Mod: S$GLB,,, | Performed by: INTERNAL MEDICINE

## 2023-08-02 PROCEDURE — 1159F MED LIST DOCD IN RCRD: CPT | Mod: CPTII,S$GLB,, | Performed by: INTERNAL MEDICINE

## 2023-08-02 PROCEDURE — 1160F PR REVIEW ALL MEDS BY PRESCRIBER/CLIN PHARMACIST DOCUMENTED: ICD-10-PCS | Mod: CPTII,S$GLB,, | Performed by: INTERNAL MEDICINE

## 2023-08-02 PROCEDURE — 99999 PR PBB SHADOW E&M-EST. PATIENT-LVL III: CPT | Mod: PBBFAC,,, | Performed by: INTERNAL MEDICINE

## 2023-08-02 PROCEDURE — 1160F RVW MEDS BY RX/DR IN RCRD: CPT | Mod: CPTII,S$GLB,, | Performed by: INTERNAL MEDICINE

## 2023-08-02 PROCEDURE — 3079F DIAST BP 80-89 MM HG: CPT | Mod: CPTII,S$GLB,, | Performed by: INTERNAL MEDICINE

## 2023-08-02 PROCEDURE — 99999 PR PBB SHADOW E&M-EST. PATIENT-LVL III: ICD-10-PCS | Mod: PBBFAC,,, | Performed by: INTERNAL MEDICINE

## 2023-08-02 PROCEDURE — 3075F PR MOST RECENT SYSTOLIC BLOOD PRESS GE 130-139MM HG: ICD-10-PCS | Mod: CPTII,S$GLB,, | Performed by: INTERNAL MEDICINE

## 2023-08-02 PROCEDURE — 1157F PR ADVANCE CARE PLAN OR EQUIV PRESENT IN MEDICAL RECORD: ICD-10-PCS | Mod: CPTII,S$GLB,, | Performed by: INTERNAL MEDICINE

## 2023-08-02 RX ORDER — COLCHICINE 0.6 MG/1
0.6 TABLET ORAL 2 TIMES DAILY
COMMUNITY
Start: 2023-07-19 | End: 2023-08-23 | Stop reason: SDUPTHER

## 2023-08-02 RX ORDER — DOCUSATE SODIUM 100 MG/1
100 CAPSULE, LIQUID FILLED ORAL
COMMUNITY
Start: 2023-06-29

## 2023-08-02 RX ORDER — HYDROCODONE BITARTRATE AND ACETAMINOPHEN 5; 325 MG/1; MG/1
1 TABLET ORAL
COMMUNITY
Start: 2023-06-30 | End: 2023-08-23

## 2023-08-02 RX ORDER — LOSARTAN POTASSIUM 25 MG/1
25 TABLET ORAL
COMMUNITY
Start: 2023-07-24 | End: 2023-08-02

## 2023-08-02 RX ORDER — ADHESIVE BANDAGE
30 BANDAGE TOPICAL DAILY PRN
COMMUNITY
Start: 2023-06-29 | End: 2023-08-02

## 2023-08-02 RX ORDER — DICLOFENAC SODIUM 10 MG/G
GEL TOPICAL 4 TIMES DAILY
COMMUNITY
Start: 2023-06-30

## 2023-08-02 NOTE — PROGRESS NOTES
"[facility] Cardiology 701     SUBJECTIVE:     History of Present Illness:  Patient is a 78 y.o. male presents to establish cardiac care. .Seen by  in . He is unclear why he is here     Primary Diagnosis:   Hypertension: positive  DM: none  Smoker: quit over 50 years ago  Family history of early CAD  Heart disease: atrial fibrillation diagnosed in; no history of MI or heart failure    ROS  Wheel chair bound  No chest pains  No shortness of breath; no PND or orthopnea  No syncope  No palpitatons    Review of patient's allergies indicates:   Allergen Reactions    Heparin analogues        Past Medical History:   Diagnosis Date    Atrial fibrillation     Hypertension     Irregular heart beat     Supraventricular tachycardia        No past surgical history on file.        Past Hospitalization:     : slurred speech and left sided weakness; acute infarcts left cerebellar area;     Cardiac meds:  ASA 81 mg  Atorvastatin 40 mg  Carvedilol 12.5 mg BID  HCTZ 12.5 mg  Losartan 25 mg  Xarelto 20 mg         OBJECTIVE:     Vital Signs (Most Recent)  Vitals:    23 1326   BP: 135/84   Pulse: 100   SpO2: 95%   Weight: 94.8 kg (208 lb 15.9 oz)   Height: 5' 11" (1.803 m)         Physical Exam:  In chair:   Neck: normal carotids, no bruits; normal JVP  Lungs :clear  Heart: RR, normal S1,S2, no murmurs, no gallops  Abd: not examined   Exts: normal DP and PT pulses bilaterally, normal radials; no edema noted           LABS  : GFR normal         Diagnostic Results:    1.EK, , : all sinus ; PVC; nonspecific T wave changes   2.Echo: : EF normal; LAE, diastolic dysfunction   3. Stress test  4. cath    Chart review:      ASSESSMENT/PLAN:     Paroxysmal atrial fibrillation: in sinus now  Hypertension: controlled  Chronic diastolic dysfunction - comensated  Previous stroke and debilitated     Plan: continue the same medications  Continue the xarelto for life  Return as needed     Dewayne TOSCANO" MD Kitty

## 2023-08-05 PROCEDURE — G0180 MD CERTIFICATION HHA PATIENT: HCPCS | Mod: ,,, | Performed by: FAMILY MEDICINE

## 2023-08-05 PROCEDURE — G0180 PR HOME HEALTH MD CERTIFICATION: ICD-10-PCS | Mod: ,,, | Performed by: FAMILY MEDICINE

## 2023-08-08 ENCOUNTER — TELEPHONE (OUTPATIENT)
Dept: FAMILY MEDICINE | Facility: CLINIC | Age: 78
End: 2023-08-08
Payer: MEDICARE

## 2023-08-08 NOTE — TELEPHONE ENCOUNTER
----- Message from Analisa Tierney LPN sent at 8/8/2023  9:56 AM CDT -----  Contact: Brother    ----- Message -----  From: Kiana Ledezma  Sent: 8/8/2023   9:36 AM CDT  To: Mark Pedroza Staff    Type:  RX Refill Request    Who Called: pt brother   Refill or New Rx:refill   RX Name and Strength:rivaroxaban (XARELTO ORAL)  How is the patient currently taking it? (ex. 1XDay):1  Is this a 30 day or 90 day RX:N/A  Preferred Pharmacy with phone number:[facility]/pharmacy #[id number] - [address] AT [facility]   Phone: [phone]  Fax:  [phone]  Local or Mail Order:local  Ordering Provider:  Would the patient rather a call back or a response via [facility]? Call   Best Call Back Number:[phone]  Additional Information:

## 2023-08-08 NOTE — TELEPHONE ENCOUNTER
----- Message from Nancy Cho sent at 5/29/2023 12:34 PM CDT -----  Regarding: Hospital follow up  Contact: [phone]  Nurse  is requesting a call back regarding establishing care for a hospital follow up.   Would the patient rather a call back or a response via [facility]?  Call   Best Call Back Number:  [phone]  Additional Information:               Patient with left breast tenderness in lateral breast and in axilla. He does have gynecomastia of bilateral breasts. We will obtain breast US to rule out any concerns.

## 2023-08-11 ENCOUNTER — TELEPHONE (OUTPATIENT)
Dept: FAMILY MEDICINE | Facility: CLINIC | Age: 78
End: 2023-08-11
Payer: MEDICARE

## 2023-08-11 NOTE — TELEPHONE ENCOUNTER
----- Message from Kiana Ledezma sent at 8/11/2023 11:40 AM CDT -----  Contact: Marguerite  Type:  RX Refill Request    Who Called: Marguerite with HH   Refill or New Rx:refill  RX Name and Strength:rivaroxaban (XARELTO ORAL)  How is the patient currently taking it? (ex. 1XDay):1  Is this a 30 day or 90 day RX:N/A  Preferred Pharmacy with phone number:[facility]/pharmacy #[id number] [address] AT [facility]   Phone: [phone]  Fax:  [phone]  Local or Mail Order:local  Ordering Provider:N/A  Would the patient rather a call back or a response via [facility]? Call   Best Call Back Number:[phone]  Additional Information:      Marguerite stated pt is out of this medication

## 2023-08-14 PROBLEM — I63.9 ACUTE CVA (CEREBROVASCULAR ACCIDENT): Status: ACTIVE | Noted: 2023-08-14

## 2023-08-14 PROBLEM — I48.0 PAROXYSMAL ATRIAL FIBRILLATION: Status: ACTIVE | Noted: 2023-08-14

## 2023-08-15 ENCOUNTER — TELEPHONE (OUTPATIENT)
Dept: FAMILY MEDICINE | Facility: CLINIC | Age: 78
End: 2023-08-15
Payer: MEDICARE

## 2023-08-15 NOTE — TELEPHONE ENCOUNTER
----- Message from Michelle Henriquez sent at 8/15/2023 10:14 AM CDT -----    [phone] Type:  RX Refill Request    Who Called: Marguerite/[facility]  Refill or New Rx:refill  RX Name and Strength:rivaroxaban (XARELTO ORAL)  Preferred Pharmacy with phone number:[facility]/pharmacy #[id number] [address] AT [facility]  Local or Mail Order:local  Ordering Provider:krystian  Would the patient rather a call back or a response via [facility]? call  Best Call Back Number:[phone]  Additional Information:

## 2023-08-18 ENCOUNTER — TELEPHONE (OUTPATIENT)
Dept: FAMILY MEDICINE | Facility: CLINIC | Age: 78
End: 2023-08-18
Payer: MEDICARE

## 2023-08-18 NOTE — TELEPHONE ENCOUNTER
I notified the pt HH nurse the pt was only seen by dr matthews in [place] rehab  He needs to follow up with PCP   Or obtain refills from cardiologist - Kitty

## 2023-08-18 NOTE — TELEPHONE ENCOUNTER
----- Message from Rosalie Armendariz sent at 8/18/2023 11:28 AM CDT -----  Type:  Needs Medical Advice    Who Called: zan    Would the patient rather a call back or a response via [facility]? call  Best Call Back Number: [phone]  Additional Information: pt requesting a call back to discuss rivaroxaban (XARELTO ORAL) not being refilled home health nurse have left several messages

## 2023-08-22 ENCOUNTER — TELEPHONE (OUTPATIENT)
Dept: FAMILY MEDICINE | Facility: CLINIC | Age: 78
End: 2023-08-22
Payer: MEDICARE

## 2023-08-22 NOTE — TELEPHONE ENCOUNTER
Spoke with pt's brother (Usman) advised 1st available new patient with Dr Clement not until 2024; provided brother with [facility] physician ph# to try scheduling appt     ----- Message from Nancy Cho sent at 8/22/2023  9:13 AM CDT -----  Regarding: NP to est care  Contact: [phone]  Patient is requesting a call back regarding establishing care. Needs a post rehab appointment this week for medications.    Would the patient rather a call back or a response via [facility]?  Call   Best Call Back Number:  [phone]   Additional Information:

## 2023-08-23 ENCOUNTER — TELEPHONE (OUTPATIENT)
Dept: SLEEP MEDICINE | Facility: CLINIC | Age: 78
End: 2023-08-23
Payer: MEDICARE

## 2023-08-23 ENCOUNTER — OFFICE VISIT (OUTPATIENT)
Dept: FAMILY MEDICINE | Facility: HOSPITAL | Age: 78
End: 2023-08-23
Payer: MEDICARE

## 2023-08-23 VITALS
DIASTOLIC BLOOD PRESSURE: 72 MMHG | BODY MASS INDEX: 28.09 KG/M2 | HEART RATE: 89 BPM | HEIGHT: 71 IN | SYSTOLIC BLOOD PRESSURE: 118 MMHG | WEIGHT: 200.63 LBS

## 2023-08-23 DIAGNOSIS — E78.5 HYPERLIPIDEMIA, UNSPECIFIED HYPERLIPIDEMIA TYPE: ICD-10-CM

## 2023-08-23 DIAGNOSIS — I10 PRIMARY HYPERTENSION: ICD-10-CM

## 2023-08-23 DIAGNOSIS — I69.328 CVA, OLD, SPEECH/LANGUAGE DEFICIT: ICD-10-CM

## 2023-08-23 DIAGNOSIS — M1A.9XX0 CHRONIC GOUT WITHOUT TOPHUS, UNSPECIFIED CAUSE, UNSPECIFIED SITE: ICD-10-CM

## 2023-08-23 DIAGNOSIS — I48.0 PAF (PAROXYSMAL ATRIAL FIBRILLATION): Primary | ICD-10-CM

## 2023-08-23 PROCEDURE — 99213 OFFICE O/P EST LOW 20 MIN: CPT | Performed by: STUDENT IN AN ORGANIZED HEALTH CARE EDUCATION/TRAINING PROGRAM

## 2023-08-23 RX ORDER — ALLOPURINOL 100 MG/1
100 TABLET ORAL DAILY
Qty: 30 TABLET | Refills: 11 | Status: SHIPPED | OUTPATIENT
Start: 2023-08-23

## 2023-08-23 RX ORDER — ALLOPURINOL 100 MG/1
100 TABLET ORAL
COMMUNITY
Start: 2023-07-24 | End: 2023-08-23 | Stop reason: SDUPTHER

## 2023-08-23 RX ORDER — CARVEDILOL 12.5 MG/1
12.5 TABLET ORAL DAILY
Qty: 30 TABLET | Refills: 11 | Status: SHIPPED | OUTPATIENT
Start: 2023-08-23 | End: 2023-09-21 | Stop reason: SDUPTHER

## 2023-08-23 RX ORDER — FOLIC ACID 1 MG/1
1 TABLET ORAL DAILY
Qty: 30 TABLET | Refills: 11
Start: 2023-08-23 | End: 2024-08-22

## 2023-08-23 RX ORDER — ASPIRIN 81 MG/1
81 TABLET ORAL DAILY
Qty: 90 TABLET | Refills: 3
Start: 2023-08-23 | End: 2024-08-22

## 2023-08-23 RX ORDER — ATORVASTATIN CALCIUM 40 MG/1
40 TABLET, FILM COATED ORAL DAILY
Qty: 90 TABLET | Refills: 3
Start: 2023-08-23 | End: 2023-10-02 | Stop reason: SDUPTHER

## 2023-08-23 RX ORDER — COLCHICINE 0.6 MG/1
0.6 TABLET ORAL 2 TIMES DAILY PRN
Qty: 20 TABLET | Refills: 0 | Status: SHIPPED | OUTPATIENT
Start: 2023-08-23

## 2023-08-23 NOTE — PROGRESS NOTES
History & Physical  [facility]      SUBJECTIVE:     Pt is a 77 yo M with pmh of CVA, alcohol withdrawal with seizure, PAF, HTN, HLD who presents to clinic for hospital follow up and to establish care. PT has been doing well since his discharge, has been sober since admission. Has not had further seizure, minimal speech deficit noted has had good progress with speech therapy. Adherent to medications. Needs refills on xarelto.        - Colonoscopy: DONE - three years ago wnl    (Grade A: adults 45-75; colonoscopy q10y, fecal immunochemical testing (FIT) q1y, FIT-fecal DNA testing (Cologuard) q3y)  - DM: Last A1c: 5.2  (Grade B: adults 40-70 with BMI ?25; if normal, repeat q3y)  - Statin: YES  (Grade B: adults 40-75 years with no history of CVD, ?1 CVD risk factors (dyslipidemia, DM, HTN, or smoking), and ASCVD ?10%)  Last lipid panel-  5/27/23  - Flu: COMPLETED  (All patients ?6 months, qyear)  - HIV: COMPLETED - NEGATIVE  (Grade A: ages 15-65 years; ?66 if high-risk)  Last STI testing- N/A  - DXA: N/A  (Grade B: women ?65 years or post-menopausal women with risk-factors)  - LDCT: N/A  (Grade B: q1yr for adults 50-80 years with 20 PY and currently smoke or have quit ?15 years)  - US abdomen: N/A   (Grade B: one-time screening for AAA in men 65-75 years who have ever smoked)  - Depression: NO  (All adults)  - Fall risk: YES  Get Up and Go Test (positive >30 seconds, or negative <20; get up, walk 10 feet, turn around and sit; adults ?65 years).  - Tobacco abuse: FORMER SMOKER - quit 25 years ago, pack years 10  (Grade A: all adults)    EtOH use-  abstinent since admission  Drug use- N/A    Review of patient's allergies indicates:   Allergen Reactions    Heparin     Heparin analogues        Past Medical History:   Diagnosis Date    Atrial fibrillation     Hypertension     Irregular heart beat     Supraventricular tachycardia      No past surgical history on file.  No family history on file.  Social History  "    Tobacco Use    Smoking status: Former    Smokeless tobacco: Never   Substance Use Topics    Alcohol use: Not Currently     Comment: severe alcohol use disorder in early remission (7 days)        OBJECTIVE:     Vital Signs (Most Recent)  Vitals:    08/23/23 1323   BP: 118/72   Pulse: 89   Weight: 91 kg (200 lb 9.9 oz)   Height: 5' 11" (1.803 m)     Body mass index is 27.98 kg/m².     Review of Systems   Constitutional:  Negative for chills, fever and malaise/fatigue.   HENT:  Negative for congestion, ear pain, hearing loss and sore throat.    Eyes:  Negative for blurred vision, pain and redness.   Respiratory:  Negative for cough and shortness of breath.    Cardiovascular:  Negative for chest pain, palpitations and leg swelling.   Gastrointestinal:  Negative for abdominal pain, constipation and diarrhea.   Genitourinary:  Negative for dysuria, frequency and urgency.   Musculoskeletal:  Negative for back pain, joint pain and myalgias.   Skin:  Negative for rash.   Neurological:  Positive for speech change and weakness. Negative for focal weakness, seizures and headaches.   Psychiatric/Behavioral:  Negative for depression and substance abuse.         Physical Exam  Vitals and nursing note reviewed.   Constitutional:       Appearance: Normal appearance.   HENT:      Head: Normocephalic and atraumatic.      Nose: Nose normal.      Mouth/Throat:      Mouth: Mucous membranes are moist.      Pharynx: Oropharynx is clear.   Eyes:      Extraocular Movements: Extraocular movements intact.      Conjunctiva/sclera: Conjunctivae normal.      Pupils: Pupils are equal, round, and reactive to light.   Cardiovascular:      Rate and Rhythm: Normal rate and regular rhythm.      Pulses: Normal pulses.      Heart sounds: Normal heart sounds.   Pulmonary:      Effort: Pulmonary effort is normal.      Breath sounds: Normal breath sounds.   Abdominal:      General: Abdomen is flat.      Palpations: Abdomen is soft.   Musculoskeletal:    "      General: Normal range of motion.      Cervical back: Normal range of motion.   Skin:     General: Skin is warm and dry.   Neurological:      General: No focal deficit present.      Mental Status: He is alert and oriented to person, place, and time.      Comments: Mild slurred speech, improved from prior   Psychiatric:         Mood and Affect: Mood normal.         Behavior: Behavior normal.         Thought Content: Thought content normal.          ASSESSMENT/PLAN:   78 y.o.male with above pmh presents to clinic to establish care. Doing well on current regimen, will refill all medications, encourage pt to follow up with physical therapy, graduated speech therapy    PAF (paroxysmal atrial fibrillation)  -     carvediloL (COREG) 12.5 MG tablet; Take 1 tablet (12.5 mg total) by mouth Daily. For blood pressure and heart rate  Dispense: 30 tablet; Refill: 11  -     rivaroxaban (XARELTO) 20 mg Tab; Take 1 tablet (20 mg total) by mouth Daily.  Dispense: 90 tablet; Refill: 1    Hyperlipidemia, unspecified hyperlipidemia type  -     atorvastatin (LIPITOR) 40 MG tablet; Take 1 tablet (40 mg total) by mouth once daily.  Dispense: 90 tablet; Refill: 3    Primary hypertension  -     carvediloL (COREG) 12.5 MG tablet; Take 1 tablet (12.5 mg total) by mouth Daily. For blood pressure and heart rate  Dispense: 30 tablet; Refill: 11    Chronic gout without tophus, unspecified cause, unspecified site  -     colchicine, gout, (COLCRYS) 0.6 mg tablet; Take 1 tablet (0.6 mg total) by mouth 2 (two) times daily as needed (Gout attack). Take at symptom onset for 4 days or until symptoms improve  Dispense: 20 tablet; Refill: 0  -     allopurinoL (ZYLOPRIM) 100 MG tablet; Take 1 tablet (100 mg total) by mouth once daily.  Dispense: 30 tablet; Refill: 11    CVA, old, speech/language deficit  -     aspirin (ECOTRIN) 81 MG EC tablet; Take 1 tablet (81 mg total) by mouth once daily.  Dispense: 90 tablet; Refill: 3    Other orders  -     folic  acid (FOLVITE) 1 MG tablet; Take 1 tablet (1 mg total) by mouth once daily.  Dispense: 30 tablet; Refill: 11        Follow-up: 1 month    Mike Morley MD, MPH  [facility], PGY-3    This note was partially created using [employer] Voice Recognition software. Typographical and content errors may occur with this process. While efforts are made to detect and correct such errors, in some cases errors will persist. For this reason, wording in this document should be considered in the proper context and not strictly verbatim.

## 2023-08-23 NOTE — TELEPHONE ENCOUNTER
----- Message from Kiana Ledezma sent at 8/22/2023  9:44 AM CDT -----  Type:  RX Refill Request    Who Called: Marguerite   Refill or New Rx:refill  RX Name and Strength:rivaroxaban (XARELTO ORAL)  How is the patient currently taking it? (ex. 1XDay):Take 20 mg by mouth Daily.   Is this a 30 day or 90 day RX:N/A  Preferred Pharmacy with phone number:[facility]/pharmacy #[id number] - [address] AT [facility]   Phone: [phone]  Fax:  [phone]  Local or Mail Order:local  Ordering Provider:Kitty  Would the patient rather a call back or a response via [facility]? Call   Best Call Back Number:[phone]  Additional Information:

## 2023-08-29 NOTE — PROGRESS NOTES
I assume primary medical responsibility for this patient. I have reviewed the history, physical, and assessment & treatment plan with the resident and agree that the care is reasonable and necessary. This service has been performed by a resident without the presence of a teaching physician under the primary care exception. If necessary, an addendum of additional findings or evaluation beyond the resident documentation will be noted below.        Vick Spears Jr., DO    [facility]

## 2023-09-01 DIAGNOSIS — I69.328 CVA, OLD, SPEECH/LANGUAGE DEFICIT: Primary | ICD-10-CM

## 2023-09-06 ENCOUNTER — TELEPHONE (OUTPATIENT)
Dept: FAMILY MEDICINE | Facility: CLINIC | Age: 78
End: 2023-09-06
Payer: MEDICARE

## 2023-09-06 NOTE — TELEPHONE ENCOUNTER
----- Message from Omkar Kim sent at 9/6/2023 11:55 AM CDT -----  Type:  home health    Who Called: irina at home [facility] care  Would the patient rather a call back or a response via [facility]? call  Best Call Back Number:   Additional Information: notifying a delay in treatment

## 2023-09-11 PROBLEM — I63.9 CARDIOEMBOLIC STROKE: Status: RESOLVED | Noted: 2023-06-04 | Resolved: 2023-09-11

## 2023-09-21 ENCOUNTER — TELEPHONE (OUTPATIENT)
Dept: FAMILY MEDICINE | Facility: CLINIC | Age: 78
End: 2023-09-21
Payer: MEDICARE

## 2023-09-21 ENCOUNTER — OFFICE VISIT (OUTPATIENT)
Dept: FAMILY MEDICINE | Facility: HOSPITAL | Age: 78
End: 2023-09-21
Payer: MEDICARE

## 2023-09-21 VITALS
HEIGHT: 71 IN | WEIGHT: 205.25 LBS | HEART RATE: 86 BPM | BODY MASS INDEX: 28.73 KG/M2 | DIASTOLIC BLOOD PRESSURE: 93 MMHG | SYSTOLIC BLOOD PRESSURE: 179 MMHG

## 2023-09-21 DIAGNOSIS — R53.1 LEFT-SIDED WEAKNESS: ICD-10-CM

## 2023-09-21 DIAGNOSIS — I69.328 CVA, OLD, SPEECH/LANGUAGE DEFICIT: Primary | ICD-10-CM

## 2023-09-21 DIAGNOSIS — I48.0 PAF (PAROXYSMAL ATRIAL FIBRILLATION): ICD-10-CM

## 2023-09-21 DIAGNOSIS — I10 PRIMARY HYPERTENSION: ICD-10-CM

## 2023-09-21 PROCEDURE — 99213 OFFICE O/P EST LOW 20 MIN: CPT | Performed by: STUDENT IN AN ORGANIZED HEALTH CARE EDUCATION/TRAINING PROGRAM

## 2023-09-21 RX ORDER — CARVEDILOL 12.5 MG/1
12.5 TABLET ORAL 2 TIMES DAILY
Qty: 60 TABLET | Refills: 11 | Status: SHIPPED | OUTPATIENT
Start: 2023-09-21 | End: 2023-10-03

## 2023-09-21 NOTE — PROGRESS NOTES
PROGR[facility]  [facility]    Subjective:       Patient ID: Dejuan Correa is a 78 y.o. male.    Chief Complaint: Follow-up      77 yo M with with pmh prior CVA with L sided deficit, htn, hld, PAF, who presents to clinic for follow up. PT continues to progress with PT for residual weakness. Now able to attend outpatient PT. Still requires wheelchair and would benefit from motorized wheel chair due to persistent LUE LLE weakness. Also has noted some lightheadedness when taking morning medication causing pt to lie down for an hour. Pt's BP checked by home health regularly between 140-130 systolic over 70-90 diastolic after taking medication. Pt has been taking carvedilol only once a day, has not checked his BP in the morning prior to chekcing BP. Additionally, pt curious about eligibility to drive. Pt's seizure was clearly provoked by alcohol withdrawal, pt has been sober since admission, no further seizures noted, therefore has no concern for seizure while driving if pt remains sober.     Review of Systems   Constitutional:  Negative for activity change, appetite change, chills and unexpected weight change.   HENT:  Negative for congestion, ear pain, facial swelling, hearing loss and rhinorrhea.    Eyes:  Negative for discharge and redness.   Respiratory:  Negative for cough and shortness of breath.    Cardiovascular:  Negative for chest pain and leg swelling.   Gastrointestinal:  Negative for abdominal distention and abdominal pain.   Musculoskeletal: Negative.    Skin: Negative.    Neurological:  Positive for weakness. Negative for seizures, facial asymmetry, speech difficulty and headaches.     Objective:      Vitals:    09/21/23 1328   BP: (!) 179/93   Pulse: 86     Body mass index is 28.63 kg/m².  Physical Exam  Vitals and nursing note reviewed.   Constitutional:       Appearance: Normal appearance.   HENT:      Head: Normocephalic and atraumatic.   Eyes:      Pupils: Pupils are equal, round, and  reactive to light.   Cardiovascular:      Rate and Rhythm: Normal rate and regular rhythm.      Heart sounds: Normal heart sounds.   Pulmonary:      Breath sounds: Normal breath sounds.   Abdominal:      General: Abdomen is flat.      Palpations: Abdomen is soft.   Musculoskeletal:         General: No swelling or tenderness.      Cervical back: Normal range of motion.      Comments: R sided ROM and strength wnl, L sided ROM wnl, strength decreased   Skin:     General: Skin is warm.   Neurological:      General: No focal deficit present.      Mental Status: He is alert.   Psychiatric:         Mood and Affect: Mood normal.       Assessment:       1. CVA, old, speech/language deficit    2. PAF (paroxysmal atrial fibrillation)    3. Primary hypertension    4. Left-sided weakness        Plan:       CVA, old, speech/language deficit    PAF (paroxysmal atrial fibrillation)  -     carvediloL (COREG) 12.5 MG tablet; Take 1 tablet (12.5 mg total) by mouth 2 (two) times daily. For blood pressure and heart rate  Dispense: 60 tablet; Refill: 11    Primary hypertension  -     carvediloL (COREG) 12.5 MG tablet; Take 1 tablet (12.5 mg total) by mouth 2 (two) times daily. For blood pressure and heart rate  Dispense: 60 tablet; Refill: 11    Left-sided weakness        Follow up in: 2 months        Mike Morley MD, MPH  [facility], PGY-3    This note was partially created using Helpa Voice Recognition software. Typographical and content errors may occur with this process. While efforts are made to detect and correct such errors, in some cases errors will persist. For this reason, wording in this document should be considered in the proper context and not strictly verbatim.

## 2023-09-21 NOTE — TELEPHONE ENCOUNTER
I LM for pt HH nurse  Pt was seen by dr matthews at [facility]   Pt has pcp at [facility] in [place]

## 2023-09-25 ENCOUNTER — DOCUMENT SCAN (OUTPATIENT)
Dept: HOME HEALTH SERVICES | Facility: HOSPITAL | Age: 78
End: 2023-09-25
Payer: MEDICARE

## 2023-09-26 ENCOUNTER — EXTERNAL HOME HEALTH (OUTPATIENT)
Dept: HOME HEALTH SERVICES | Facility: HOSPITAL | Age: 78
End: 2023-09-26
Payer: MEDICARE

## 2023-09-26 DIAGNOSIS — R53.1 LEFT-SIDED WEAKNESS: Primary | ICD-10-CM

## 2023-09-26 NOTE — PROGRESS NOTES
Pt graduating form home pt wants to proceed with outpatient pt at [facility] in [facility], orders placed

## 2023-10-02 DIAGNOSIS — I10 PRIMARY HYPERTENSION: ICD-10-CM

## 2023-10-02 DIAGNOSIS — E78.5 HYPERLIPIDEMIA, UNSPECIFIED HYPERLIPIDEMIA TYPE: ICD-10-CM

## 2023-10-02 DIAGNOSIS — I48.0 PAF (PAROXYSMAL ATRIAL FIBRILLATION): ICD-10-CM

## 2023-10-03 RX ORDER — ATORVASTATIN CALCIUM 40 MG/1
40 TABLET, FILM COATED ORAL DAILY
Qty: 90 TABLET | Refills: 3 | Status: SHIPPED | OUTPATIENT
Start: 2023-10-03 | End: 2024-10-02

## 2023-10-03 RX ORDER — CARVEDILOL 12.5 MG/1
25 TABLET ORAL 2 TIMES DAILY
Qty: 60 TABLET | Refills: 11 | Status: SHIPPED | OUTPATIENT
Start: 2023-10-03 | End: 2023-11-16 | Stop reason: SDUPTHER

## 2023-10-03 RX ORDER — ATORVASTATIN CALCIUM 40 MG/1
40 TABLET, FILM COATED ORAL DAILY
Qty: 90 TABLET | Refills: 3
Start: 2023-10-03 | End: 2023-10-03

## 2023-11-13 PROBLEM — I63.9 ACUTE CVA (CEREBROVASCULAR ACCIDENT): Status: RESOLVED | Noted: 2023-08-14 | Resolved: 2023-11-13

## 2023-11-16 ENCOUNTER — TELEPHONE (OUTPATIENT)
Dept: FAMILY MEDICINE | Facility: HOSPITAL | Age: 78
End: 2023-11-16
Payer: MEDICARE

## 2023-11-16 DIAGNOSIS — I48.0 PAF (PAROXYSMAL ATRIAL FIBRILLATION): ICD-10-CM

## 2023-11-16 DIAGNOSIS — I10 PRIMARY HYPERTENSION: ICD-10-CM

## 2023-11-16 RX ORDER — CARVEDILOL 25 MG/1
25 TABLET ORAL 2 TIMES DAILY
Qty: 180 TABLET | Refills: 3 | Status: SHIPPED | OUTPATIENT
Start: 2023-11-16 | End: 2024-04-03

## 2023-11-16 NOTE — TELEPHONE ENCOUNTER
Discussed with pt some unilateral foot swelling, will have pt make appointment to evaluate, will refill coreg at this time    ----- Message from Nydia Leyva sent at 11/16/2023 10:57 AM CST -----  Regarding: refill on med and call pt  Pt called and said he needs a refill on his meds and also the medicine that the Dr have him on and increase his dose on have his feet swelling now. Pt would like for the DR to give him a call back... pt # [phone] (M)  carvediloL  carvediloL (COREG) 12.5 MG tablet

## 2024-01-30 ENCOUNTER — OFFICE VISIT (OUTPATIENT)
Dept: FAMILY MEDICINE | Facility: HOSPITAL | Age: 79
End: 2024-01-30
Payer: MEDICARE

## 2024-01-30 ENCOUNTER — HOSPITAL ENCOUNTER (OUTPATIENT)
Dept: RADIOLOGY | Facility: HOSPITAL | Age: 79
Discharge: HOME | End: 2024-01-30
Attending: STUDENT IN AN ORGANIZED HEALTH CARE EDUCATION/TRAINING PROGRAM
Payer: MEDICARE

## 2024-01-30 VITALS
HEART RATE: 73 BPM | BODY MASS INDEX: 28.83 KG/M2 | DIASTOLIC BLOOD PRESSURE: 85 MMHG | HEIGHT: 71 IN | WEIGHT: 205.94 LBS | SYSTOLIC BLOOD PRESSURE: 159 MMHG

## 2024-01-30 DIAGNOSIS — N52.9 ERECTILE DYSFUNCTION, UNSPECIFIED ERECTILE DYSFUNCTION TYPE: ICD-10-CM

## 2024-01-30 DIAGNOSIS — R07.9 CHEST PAIN, UNSPECIFIED TYPE: ICD-10-CM

## 2024-01-30 DIAGNOSIS — I10 PRIMARY HYPERTENSION: Primary | ICD-10-CM

## 2024-01-30 DIAGNOSIS — I48.0 PAF (PAROXYSMAL ATRIAL FIBRILLATION): ICD-10-CM

## 2024-01-30 DIAGNOSIS — E78.5 HYPERLIPIDEMIA, UNSPECIFIED HYPERLIPIDEMIA TYPE: ICD-10-CM

## 2024-01-30 DIAGNOSIS — Z11.59 ENCOUNTER FOR HEPATITIS C SCREENING TEST FOR LOW RISK PATIENT: ICD-10-CM

## 2024-01-30 PROCEDURE — 71046 X-RAY EXAM CHEST 2 VIEWS: CPT | Mod: 26,,, | Performed by: RADIOLOGY

## 2024-01-30 PROCEDURE — 71046 X-RAY EXAM CHEST 2 VIEWS: CPT | Mod: TC,FY

## 2024-01-30 PROCEDURE — 99213 OFFICE O/P EST LOW 20 MIN: CPT | Mod: 25 | Performed by: STUDENT IN AN ORGANIZED HEALTH CARE EDUCATION/TRAINING PROGRAM

## 2024-01-30 RX ORDER — TADALAFIL 5 MG/1
5 TABLET ORAL DAILY PRN
Qty: 30 TABLET | Refills: 0 | Status: SHIPPED | OUTPATIENT
Start: 2024-01-30

## 2024-01-31 NOTE — PROGRESS NOTES
"PROGRESS NOTE  [facility]    Subjective:       Patient ID: Dejuan Correa is a 78 y.o. male.    Chief Complaint: lump to ribs      79 yo M with with pmh prior CVA with L sided deficit, htn, hld, PAF, who presents to clinic for for BP check and for a mass under ribs.  Patient's states that his blood pressure has been elevated at home with systolics in the 180s and diastolics in the 100s however he suspected that his machine at home was malfunctioning as these pressures are not his normal and only started to peer after he changed the batteries on his blood pressure cuff.  In clinic today he is less hypertensive, 160/85, denies headache, chest pain, changes in vision.  Is only on carvedilol b.i.d..  Continues progressed well with PT after his CVA.  States that he has had a "mass" in his substernal region for several years, feels that is hard and bony, has only just started to bother her in the past couple of months.    Review of Systems   Constitutional:  Negative for activity change, appetite change, chills and unexpected weight change.   HENT:  Negative for congestion, ear pain, facial swelling, hearing loss and rhinorrhea.    Eyes:  Negative for discharge and redness.   Respiratory:  Negative for cough and shortness of breath.    Cardiovascular:  Negative for chest pain and leg swelling.   Gastrointestinal:  Negative for abdominal distention and abdominal pain.   Musculoskeletal: Negative.    Skin: Negative.    Neurological:  Positive for weakness. Negative for seizures, facial asymmetry, speech difficulty and headaches.       Objective:      Vitals:    01/30/24 1010   BP: (!) 159/85   Pulse: 73     Body mass index is 28.72 kg/m².  Physical Exam  Vitals and nursing note reviewed.   Constitutional:       Appearance: Normal appearance.   HENT:      Head: Normocephalic and atraumatic.   Eyes:      Pupils: Pupils are equal, round, and reactive to light.   Cardiovascular:      Rate and Rhythm: Normal rate and " regular rhythm.      Heart sounds: Normal heart sounds.   Pulmonary:      Breath sounds: Normal breath sounds.   Abdominal:      General: Abdomen is flat.      Palpations: Abdomen is soft.   Musculoskeletal:         General: No swelling or tenderness.      Cervical back: Normal range of motion.      Comments: Bony prominence palpated in sternal region, likely xiphoid process, unlikely to be pathologic   Skin:     General: Skin is warm.   Neurological:      General: No focal deficit present.      Mental Status: He is alert.   Psychiatric:         Mood and Affect: Mood normal.         Assessment:       1. Primary hypertension    2. PAF (paroxysmal atrial fibrillation)    3. Erectile dysfunction, unspecified erectile dysfunction type    4. Chest pain, unspecified type    5. Hyperlipidemia, unspecified hyperlipidemia type    6. Encounter for hepatitis C screening test for low risk patient        Patient is a 78-year-old male with above past medical history here for blood pressure check and for chest mass.  Elevated blood pressure at home likely secondary to faulty machine, chest mass likely xiphoid process    Plan:       Encouraged patient to get new blood pressure cuff, keep blood pressure log and return to clinic in 2 weeks for blood pressure recheck, will get chest x-ray to rule out other bony pathology, masses likely benign.    Primary hypertension  -     CBC Auto Differential; Future; Expected date: 01/30/2024  -     Comprehensive Metabolic Panel; Future; Expected date: 01/30/2024    PAF (paroxysmal atrial fibrillation)    Erectile dysfunction, unspecified erectile dysfunction type  -     tadalafiL (CIALIS) 5 MG tablet; Take 1 tablet (5 mg total) by mouth daily as needed for Erectile Dysfunction.  Dispense: 30 tablet; Refill: 0    Chest pain, unspecified type  -     X-Ray Chest PA And Lateral; Future; Expected date: 01/30/2024    Hyperlipidemia, unspecified hyperlipidemia type  -     Lipid Panel; Future; Expected  date: 01/30/2024    Encounter for hepatitis C screening test for low risk patient  -     Hepatitis C Antibody; Future; Expected date: 01/30/2024        Follow up in: 2 months        Mike Morley MD, MPH  [facility], PGY-3    This note was partially created using [employer] Voice Recognition software. Typographical and content errors may occur with this process. While efforts are made to detect and correct such errors, in some cases errors will persist. For this reason, wording in this document should be considered in the proper context and not strictly verbatim.

## 2024-02-21 ENCOUNTER — TELEPHONE (OUTPATIENT)
Dept: FAMILY MEDICINE | Facility: HOSPITAL | Age: 79
End: 2024-02-21
Payer: MEDICARE

## 2024-02-21 NOTE — TELEPHONE ENCOUNTER
Called patient spoke with him regarding Blood pressure. Patient states he rested and his bp came down. He is feeling much better.

## 2024-02-21 NOTE — TELEPHONE ENCOUNTER
----- Message from Yazmin Miller sent at 2/21/2024 11:09 AM CST -----  Regarding: blood pressure  Type:  Needs Medical Advice    Who Called: pt  Would the patient rather a call back or a response via [facility]? Call  Best Call Back Number: [phone]  Additional Information: pt would like to speak with  in regards to his blood pressure he stated he has been monitoring it . He said when he last checked it was in the 200's

## 2024-03-26 ENCOUNTER — OFFICE VISIT (OUTPATIENT)
Dept: FAMILY MEDICINE | Facility: HOSPITAL | Age: 79
End: 2024-03-26
Payer: MEDICARE

## 2024-03-26 ENCOUNTER — TELEPHONE (OUTPATIENT)
Dept: FAMILY MEDICINE | Facility: HOSPITAL | Age: 79
End: 2024-03-26

## 2024-03-26 VITALS
HEART RATE: 70 BPM | SYSTOLIC BLOOD PRESSURE: 203 MMHG | WEIGHT: 209.19 LBS | BODY MASS INDEX: 29.29 KG/M2 | HEIGHT: 71 IN | DIASTOLIC BLOOD PRESSURE: 93 MMHG

## 2024-03-26 DIAGNOSIS — I10 PRIMARY HYPERTENSION: ICD-10-CM

## 2024-03-26 DIAGNOSIS — E80.6 HYPERBILIRUBINEMIA: Primary | ICD-10-CM

## 2024-03-26 DIAGNOSIS — E80.6 HYPERBILIRUBINEMIA: ICD-10-CM

## 2024-03-26 DIAGNOSIS — I10 UNCONTROLLED STAGE 2 HYPERTENSION: Primary | ICD-10-CM

## 2024-03-26 DIAGNOSIS — D64.9 NORMOCYTIC ANEMIA: ICD-10-CM

## 2024-03-26 DIAGNOSIS — E66.3 OVERWEIGHT (BMI 25.0-29.9): ICD-10-CM

## 2024-03-26 PROCEDURE — 99213 OFFICE O/P EST LOW 20 MIN: CPT

## 2024-03-26 RX ORDER — AMLODIPINE AND BENAZEPRIL HYDROCHLORIDE 5; 20 MG/1; MG/1
1 CAPSULE ORAL DAILY
Qty: 30 CAPSULE | Refills: 1 | Status: SHIPPED | OUTPATIENT
Start: 2024-03-26 | End: 2024-04-04 | Stop reason: DRUGHIGH

## 2024-03-26 NOTE — TELEPHONE ENCOUNTER
Contacted patient about lab results showing persistent anemia, hyperbilirubinemia over last year. Patient denies alcohol use for one year. Denies RUQ pain, dilshad colored, dark, bloody stools. States last colonoscopy done 4 years ago at outside facility. Unsure of results or when to repeat.     Will check direct bili, RUQ US and refer to hepatology and send to GI for anemia, evaluate for EGD, colonoscopy. Patient voiced understanding of plan.      Philip Whiting,    [facility] PGY-2

## 2024-03-26 NOTE — PROGRESS NOTES
"Progress Note  [facility]    Subjective:      Dejuan Correa is a 78 y.o. male here for blood pressure check. He has a paperwork for clearance to drive from [employer]. This is my first time evaluating this patient.       #HTN  - Advised at last appt with PCP to maintain BP log. Takes carvedilol 25 mg BID for atrial fibrillation. He is compliant with medication. Log notes BP ranges of 150-210/. Denies chest pain, palpitations, headache, SOB    #[employer] paperwork  - patient ambulates with cane/walker from prior CVA. Presents with paperwork for [facility] for physician clearance to drive. Patient sees PT MWF. States they just did a disability evaluation and said he was okay to drive.         Active Problem List with Overview Notes    Diagnosis Date Noted    Normocytic anemia 03/26/2024    Overweight (BMI 25.0-29.9) 03/26/2024    Hyperbilirubinemia 03/26/2024    Paroxysmal atrial fibrillation 08/14/2023    Alcohol withdrawal seizure with delirium 05/29/2023    Alcohol use disorder, severe, dependence 05/28/2023    PAF (paroxysmal atrial fibrillation) 10/21/2021    PSVT (paroxysmal supraventricular tachycardia) 10/21/2021    Uncontrolled stage 2 hypertension 10/21/2021    HLD (hyperlipidemia) 10/21/2021          Review of Systems   All other systems reviewed and are negative.        Objective:   BP (!) 203/93   Pulse 70   Ht 5' 11" (1.803 m)   Wt 94.9 kg (209 lb 3.5 oz)   BMI 29.18 kg/m²      Physical Exam  Vitals and nursing note reviewed.   Constitutional:       Appearance: Normal appearance.      Comments: Uses 4 point walker with wheels to ambulate   Cardiovascular:      Rate and Rhythm: Normal rate and regular rhythm.      Pulses: Normal pulses.      Heart sounds: Normal heart sounds.   Pulmonary:      Effort: Pulmonary effort is normal.      Breath sounds: Normal breath sounds.   Neurological:      Mental Status: He is alert and oriented to person, place, and time.          Assessment:   78 y.o. with        1. " Uncontrolled stage 2 hypertension    2. Primary hypertension    3. Normocytic anemia    4. Overweight (BMI 25.0-29.9)    5. Hyperbilirubinemia         Plan:   Dejuan was seen today for hypertension.    Diagnoses and all orders for this visit:    Uncontrolled stage 2 hypertension  Primary hypertension  - BP uncontrolled. Will start below medications. Warned about symptoms of hypotension. Advised to be mindful with position changes especially since patient already uses walker, has hx of falls.   -     amlodipine-benazepril 5-20 mg (LOTREL) 5-20 mg per capsule; Take 1 capsule by mouth once daily.  -     -     COMPREHENSIVE METABOLIC PANEL; Future  -     CBC Auto Differential; Future    Normocytic anemia  - noted on blood work in January. No further workup completed at that time. Hx of macrocytic anemia due to alcohol use. Last colonoscopy 4 years ago per patient. Unsure of when to repeat. Will recheck today and schedule close PCP follow up if continued anemia.   -     CBC Auto Differential; Future    Overweight (BMI 25.0-29.9)  - Lifestyle modification including heart healthy diet, low salt, exercise discussed.     Hyperbilirubinemia  noted on blood work in January. No further workup completed at that time. Will recheck today and schedule close PCP follow up if continued elevation  -     COMPREHENSIVE METABOLIC PANEL; Future    [employer] paperwork  -advised patient to have physical therapy office to fax over results of disability evaluation stating patient safe to drive. Will schedule follow up appointment to complete paperwork at that time.       Follow up in one month for BP check, anemia, hyperbilirubinemia.     Philip Whiting DO  [facility], PGY-2  2:50 PM, 03/26/2024    *This note was dictated using the M*Modal Fluency Direct word recognition program. There are word rescognition mistakes that are occasionally missed on review. \    The following information is provided to all patients.  This information is to  help you find resources for any of the problems found today that may be affecting your health:                Living healthy guide: www.[facility].[place].[facility]       Understanding Diabetes: www.diabetes.org       Eating healthy: www.cdc.gov/healthyweight      CDC home safety checklist: www.cdc.gov/steadi/patient.html      Agency on Aging: www.goea.[place].[facility]       Alcoholics anonymous (AA): www.aa.org      Physical Activity: www.therese.nih.gov/pt1xyoj       Tobacco use: www.quitwithusla.org

## 2024-03-26 NOTE — PROGRESS NOTES
I assume primary medical responsibility for this patient. I have reviewed the history, physical, and assessment & treatment plan with the resident and agree that the care is reasonable and necessary. This service has been performed by a resident without the presence of a teaching physician under the primary care exception. If necessary, an addendum of additional findings or evaluation beyond the resident documentation will be noted below.     Uncontrolled hypertension without signs/symptoms of end-organ damage during today's visit.  Recommend lifestyle modifications along with adherence to current antihypertensive medication regimen.  Recommended patient to maintain a BP log and bring it back to next scheduled clinic visit in approximately 2-4 weeks.  Recommended patient schedule a follow-up visit in approximately 2-4 weeks for repeat blood pressure check.  If blood pressure remains uncontrolled at that visit, may consider adding/adjusting current antihypertensive medication regimen.     Vick Spears Jr., DO    [facility]

## 2024-03-27 DIAGNOSIS — R74.8 ELEVATED ALKALINE PHOSPHATASE LEVEL: Primary | ICD-10-CM

## 2024-04-03 ENCOUNTER — OFFICE VISIT (OUTPATIENT)
Dept: FAMILY MEDICINE | Facility: HOSPITAL | Age: 79
End: 2024-04-03
Payer: MEDICARE

## 2024-04-03 VITALS
OXYGEN SATURATION: 96 % | WEIGHT: 198.88 LBS | HEART RATE: 101 BPM | DIASTOLIC BLOOD PRESSURE: 76 MMHG | HEIGHT: 71 IN | BODY MASS INDEX: 27.84 KG/M2 | SYSTOLIC BLOOD PRESSURE: 176 MMHG

## 2024-04-03 DIAGNOSIS — I10 PRIMARY HYPERTENSION: ICD-10-CM

## 2024-04-03 PROCEDURE — 99213 OFFICE O/P EST LOW 20 MIN: CPT | Performed by: STUDENT IN AN ORGANIZED HEALTH CARE EDUCATION/TRAINING PROGRAM

## 2024-04-03 RX ORDER — ACETAMINOPHEN 325 MG/1
650 TABLET ORAL EVERY 6 HOURS PRN
COMMUNITY
Start: 2023-06-29

## 2024-04-03 RX ORDER — AMLODIPINE AND BENAZEPRIL HYDROCHLORIDE 10; 20 MG/1; MG/1
1 CAPSULE ORAL DAILY
Qty: 90 CAPSULE | Refills: 3 | Status: SHIPPED | OUTPATIENT
Start: 2024-04-03 | End: 2025-04-03

## 2024-04-03 RX ORDER — CARVEDILOL 12.5 MG/1
TABLET ORAL
COMMUNITY
Start: 2024-02-25 | End: 2024-04-04

## 2024-04-03 NOTE — PROGRESS NOTES
PROGRESS NOTE  [facility] FAM[facility]ICINE    Subjective:       Patient ID: Dejuan Correa is a 79 y.o. male.    Chief Complaint: DMV form      Patient is a 79-year-old male with past medical history of alcohol use disorder, currently abstinent for over a year, AFib prior CVA, OA of the left knee.  Patient is here for paperwork for the DMV and for follow-up on hypertension.  Patient remains hypertensive on current regimen.  Patient is adherent to current regimen.  Patient's mobility has improved significantly since CVA.  No residual weakness.  Mobility only limited by pain in the knee secondary to OA.  Patient is seeing Orthopedics for this problem.  Patient also denies any abdominal symptoms.  Is to follow up on Dr. Lopez most recent lab studies for elevated alk-phos and bilirubin.  Patient also requires paperwork for handicap tag, mobility limited by arthritis.  Patient also needed certification of fitness to drive.  Patient has no conditions to impair his ability to drive safely.    Review of Systems   Constitutional:  Negative for activity change, appetite change, chills and unexpected weight change.   HENT:  Negative for congestion, ear pain, facial swelling, hearing loss and rhinorrhea.    Eyes:  Negative for discharge and redness.   Respiratory:  Negative for cough and shortness of breath.    Cardiovascular:  Negative for chest pain and leg swelling.   Gastrointestinal:  Negative for abdominal distention and abdominal pain.   Musculoskeletal:  Positive for arthralgias.   Skin: Negative.    Neurological:  Negative for facial asymmetry, speech difficulty, weakness and headaches.       Objective:      Vitals:    04/03/24 0948   BP: (!) 176/76   Pulse: 101     Body mass index is 27.73 kg/m².  Physical Exam  Vitals and nursing note reviewed.   Constitutional:       Appearance: Normal appearance.   HENT:      Head: Normocephalic and atraumatic.   Eyes:      Pupils: Pupils are equal, round, and reactive to light.    Cardiovascular:      Rate and Rhythm: Normal rate and regular rhythm.      Heart sounds: Normal heart sounds.   Pulmonary:      Breath sounds: Normal breath sounds.   Abdominal:      General: Abdomen is flat.      Palpations: Abdomen is soft.   Musculoskeletal:         General: No swelling or tenderness.      Cervical back: Normal range of motion.   Skin:     General: Skin is warm.   Neurological:      General: No focal deficit present.      Mental Status: He is alert.   Psychiatric:         Mood and Affect: Mood normal.         Assessment:       1. Primary hypertension        79-year-old male with above past medical history who presents for DMV paperwork for a handicap tag and to certify his fitness to drive as well as follow up on hypertension, not currently well-controlled    Plan:       Filled out paperwork for DMV stating patient's fitness to drive, need for handicap tag due to impaired mobility from arthritis.  Will also increase dose of amlodipine benazepril.    Primary hypertension  -     amlodipine-benazepril 10-20mg (LOTREL) 10-20 mg per capsule; Take 1 capsule by mouth once daily.  Dispense: 90 capsule; Refill: 3        Follow up in: 1 month        Mike Morley MD, MPH  \[facility] [place] [facility]\"" Family Medicine, PGY-3    This note was partially created using [employer] Voice Recognition software. Typographical and content errors may occur with this process. While efforts are made to detect and correct such errors, in some cases errors will persist. For this reason, wording in this document should be considered in the proper context and not strictly verbatim.

## 2024-04-04 ENCOUNTER — TELEPHONE (OUTPATIENT)
Dept: GASTROENTEROLOGY | Facility: CLINIC | Age: 79
End: 2024-04-04

## 2024-04-04 ENCOUNTER — OFFICE VISIT (OUTPATIENT)
Dept: GASTROENTEROLOGY | Facility: CLINIC | Age: 79
End: 2024-04-04
Payer: MEDICARE

## 2024-04-04 ENCOUNTER — HOSPITAL ENCOUNTER (OUTPATIENT)
Dept: RADIOLOGY | Facility: HOSPITAL | Age: 79
Discharge: HOME | End: 2024-04-04
Payer: MEDICARE

## 2024-04-04 VITALS — BODY MASS INDEX: 27.66 KG/M2 | HEIGHT: 71 IN | WEIGHT: 197.56 LBS

## 2024-04-04 DIAGNOSIS — Z12.11 SCREENING FOR COLON CANCER: Primary | ICD-10-CM

## 2024-04-04 DIAGNOSIS — E80.6 HYPERBILIRUBINEMIA: ICD-10-CM

## 2024-04-04 DIAGNOSIS — D64.9 NORMOCYTIC ANEMIA: ICD-10-CM

## 2024-04-04 PROCEDURE — 1157F ADVNC CARE PLAN IN RCRD: CPT | Mod: CPTII,S$GLB,, | Performed by: NURSE PRACTITIONER

## 2024-04-04 PROCEDURE — 99999 PR PBB SHADOW E&M-EST. PATIENT-LVL III: CPT | Mod: PBBFAC,,, | Performed by: NURSE PRACTITIONER

## 2024-04-04 PROCEDURE — 76705 ECHO EXAM OF ABDOMEN: CPT | Mod: TC

## 2024-04-04 PROCEDURE — 1159F MED LIST DOCD IN RCRD: CPT | Mod: CPTII,S$GLB,, | Performed by: NURSE PRACTITIONER

## 2024-04-04 PROCEDURE — 99205 OFFICE O/P NEW HI 60 MIN: CPT | Mod: S$GLB,,, | Performed by: NURSE PRACTITIONER

## 2024-04-04 PROCEDURE — 3288F FALL RISK ASSESSMENT DOCD: CPT | Mod: CPTII,S$GLB,, | Performed by: NURSE PRACTITIONER

## 2024-04-04 PROCEDURE — 1101F PT FALLS ASSESS-DOCD LE1/YR: CPT | Mod: CPTII,S$GLB,, | Performed by: NURSE PRACTITIONER

## 2024-04-04 PROCEDURE — 1126F AMNT PAIN NOTED NONE PRSNT: CPT | Mod: CPTII,S$GLB,, | Performed by: NURSE PRACTITIONER

## 2024-04-04 PROCEDURE — 76705 ECHO EXAM OF ABDOMEN: CPT | Mod: 26,,, | Performed by: RADIOLOGY

## 2024-04-04 RX ORDER — SODIUM, POTASSIUM,MAG SULFATES 17.5-3.13G
1 SOLUTION, RECONSTITUTED, ORAL ORAL DAILY
Qty: 1 KIT | Refills: 0 | Status: SHIPPED | OUTPATIENT
Start: 2024-04-04

## 2024-04-04 NOTE — PROGRESS NOTES
GASTROENTEROLOGY CLINIC NOTE    Chief Complaint: The primary encounter diagnosis was Screening for colon cancer. A diagnosis of Normocytic anemia was also pertinent to this visit.  Referring provider/PCP: Mike Morley MD    Dejuan Correa is a 79 y.o. male who is a new patient to me who presents to clinic for anemia. Normocytic anemia recently noted on labs.     Anemia  Presents for initial visit. There has been no abdominal pain, bruising/bleeding easily, light-headedness, malaise/fatigue, pallor, palpitations, pica or weight loss. Signs of blood loss that are not present include hematemesis, hematochezia and melena. Past treatments include nothing.       GLP-1s: No  NSAIDs: No  Anticoagulation or Antiplatelet: Xarelto    History of H.pylori:  H.pylori Treatment:  Prior Upper Endoscopy: no  Prior Colonoscopy: 6-7 years ago; polyps removed  Family h/o Colon Cancer: No  Family h/o Crohn's Disease or Ulcerative Colitis: No  Family h/o Celiac Sprue: No    Review of Systems   Constitutional:  Negative for malaise/fatigue and weight loss.   Cardiovascular:  Negative for palpitations.   Gastrointestinal:  Negative for abdominal pain, hematemesis, hematochezia and melena.   Skin:  Negative for pallor.   Neurological:  Negative for light-headedness.   Endo/Heme/Allergies:  Does not bruise/bleed easily.       Past Medical History: has a past medical history of Atrial fibrillation, Hypertension, Irregular heart beat, and Supraventricular tachycardia.    Past Surgical History: has no past surgical history on file.    Family History:family history is not on file.    Allergies:   Review of patient's allergies indicates:   Allergen Reactions    Heparin     Heparin analogues        Social History: reports that he has quit smoking. He has never used smokeless tobacco. He reports that he does not currently use alcohol.    Home medications:   Current Outpatient Medications on File Prior to Visit   Medication Sig Dispense Refill  "   acetaminophen (TYLENOL) 325 MG tablet Take 650 mg by mouth every 6 (six) hours as needed.      allopurinoL (ZYLOPRIM) 100 MG tablet Take 1 tablet (100 mg total) by mouth once daily. 30 tablet 11    amlodipine-benazepril 10-20mg (LOTREL) 10-20 mg per capsule Take 1 capsule by mouth once daily. 90 capsule 3    aspirin (ECOTRIN) 81 MG EC tablet Take 1 tablet (81 mg total) by mouth once daily. 90 tablet 3    atorvastatin (LIPITOR) 40 MG tablet Take 1 tablet (40 mg total) by mouth once daily. 90 tablet 3    colchicine, gout, (COLCRYS) 0.6 mg tablet Take 1 tablet (0.6 mg total) by mouth 2 (two) times daily as needed (Gout attack). Take at symptom onset for 4 days or until symptoms improve 20 tablet 0    diclofenac sodium (VOLTAREN) 1 % Gel Apply topically 4 (four) times daily.      docusate sodium (COLACE) 100 MG capsule Take 100 mg by mouth.      folic acid (FOLVITE) 1 MG tablet Take 1 tablet (1 mg total) by mouth once daily. 30 tablet 11    rivaroxaban (XARELTO) 20 mg Tab Take 1 tablet (20 mg total) by mouth Daily. 90 tablet 1    tadalafiL (CIALIS) 5 MG tablet Take 1 tablet (5 mg total) by mouth daily as needed for Erectile Dysfunction. 30 tablet 0    [DISCONTINUED] amlodipine-benazepril 5-20 mg (LOTREL) 5-20 mg per capsule Take 1 capsule by mouth once daily. 30 capsule 1    [DISCONTINUED] carvediloL (COREG) 12.5 MG tablet TAKE 1 TABLET (12.5 MG TOTAL) BY MOUTH DAILY. FOR BLOOD PRESSURE AND HEART RATE      [DISCONTINUED] carvediloL (COREG) 25 MG tablet Take 1 tablet (25 mg total) by mouth 2 (two) times daily. For blood pressure and heart rate (Patient not taking: Reported on 4/3/2024) 180 tablet 3     No current facility-administered medications on file prior to visit.       Vital signs:  Ht 5' 11" (1.803 m)   Wt 89.6 kg (197 lb 8.5 oz)   BMI 27.55 kg/m²     Physical Exam  Vitals reviewed.   Constitutional:       Appearance: Normal appearance.      Comments: Ambulates with walker   HENT:      Head: Normocephalic. " "  Pulmonary:      Effort: Pulmonary effort is normal. No respiratory distress.   Abdominal:      General: There is no distension.      Palpations: Abdomen is soft.      Tenderness: There is no abdominal tenderness.   Skin:     General: Skin is warm.   Neurological:      Mental Status: He is alert and oriented to person, place, and time.   Psychiatric:         Behavior: Behavior normal.         Thought Content: Thought content normal.         Routine labs:  Lab Results   Component Value Date    WBC 6.38 03/26/2024    HGB 11.3 (L) 03/26/2024    HCT 35.6 (L) 03/26/2024    MCV 84 03/26/2024     03/26/2024     Lab Results   Component Value Date    INR 1.2 06/05/2023     No results found for: "IRON", "FERRITIN", "TIBC", "FESATURATED"  Lab Results   Component Value Date     03/26/2024    K 3.7 03/26/2024     03/26/2024    CO2 27 03/26/2024    BUN 9 03/26/2024    CREATININE 1.0 03/26/2024     Lab Results   Component Value Date    ALBUMIN 4.0 03/26/2024    ALT 13 03/26/2024    AST 29 03/26/2024    GGT 47 03/26/2024    ALKPHOS 154 (H) 03/26/2024    BILITOT 2.3 (H) 03/26/2024     No results found for: "GLUCOSE"  Lab Results   Component Value Date    TSH 2.920 06/04/2023     Lab Results   Component Value Date    CALCIUM 9.6 03/26/2024    PHOS 3.4 06/05/2023       Imaging:  US Abdomen Limited  Narrative: EXAMINATION:  US ABDOMEN LIMITED    CLINICAL HISTORY:  hyperbilirubinemia; Other disorders of bilirubin metabolism    TECHNIQUE:  Limited ultrasound of the right upper quadrant of the abdomen (including pancreas, liver, gallbladder, common bile duct, and spleen) was performed.    COMPARISON:  Limited abdominal ultrasound dated 05/27/2023    FINDINGS:  Liver: Measures 16.3 cm, upper limits of normal in size.  Increased echogenicity throughout.  This limits detection for intrahepatic lesion, though none seen.    Gallbladder: Not well visualized, either decompressed or absent.    Biliary system: The common duct " measures 10 mm.  No intrahepatic biliary dilatation.    IVC: Visualized    Spleen: Measures 13.1 cm, mildly enlarged.    Miscellaneous: Pancreas mostly obscured.  No upper abdominal ascites.  Impression: Some increased liver echogenicity suggesting steatosis.    Mild splenomegaly.    Nonvisualized gallbladder, either contracted or absent.  To correlate with surgical history.    Nonspecific dilatation of the extrahepatic duct without intrahepatic biliary dilatation.  Recommend initial correlation with biliary indices.    Electronically signed by: Supa Escalona  Date:    04/04/2024  Time:    15:00      I have reviewed prior labs, imaging, and notes.      Assessment:  1. Screening for colon cancer    2. Normocytic anemia      Normocytic anemia noted on recent labs. H/H slowly decreasing over last year. No warning signs/symptoms. Does report h/o colon polyps with last colonoscopy about 6-7 years ago.   Due for screening colonoscopy    Plan:  Orders Placed This Encounter    sodium,potassium,mag sulfates (SUPREP BOWEL PREP KIT) 17.5-3.13-1.6 gram SolR    Case Request Endoscopy: COLONOSCOPY, EGD (ESOPHAGOGASTRODUODENOSCOPY)     EGD  Colonoscopy. Suprep  Clearance to hold Xarelto    Plan of care discussed with patient who is in agreement and verbalized understanding.     I have explained the planned procedures to the patient.The risks, benefits and alternatives of the procedure were also explained in detail. Patient verbalized understanding, all questions were answered. The patient agrees to proceed as planned    Follow Up: PRN      Higher than average risk given patient is on anticoagulant and will need clearance to hold Xarelto    Michaela Su, NESHA,FNP-BC  [facility]    (Portions of this note were dictated using voice recognition software and may contain dictation related errors in spelling/grammar/syntax not found on text review)

## 2024-04-04 NOTE — PATIENT INSTRUCTIONS
SUPREP Instructions    [facility] [address]  Clinic Office [phone]  Endoscopy Lab [phone]    You are scheduled for a Colonoscopy with   Dr. Castaneda  on  May 7, 2024    at [facility] in [place].    Check in at the Hospital -1st floor, Information desk.   Call [phone] to reschedule.    An adult friend/family member must come with you to drive you home.  You cannot drive, take a taxi, Uber/Lyft or bus to leave the Endoscopy Center alone.  If you do not have someone to drive you home, your test will be cancelled.     Please follow the directions of your doctor if you take any pills that thin your blood. If you take these meds: Aggrenox, Brilinta, Effient, Eliquis, Lovenox, Plavix, Pletal, Pradaxa, Ticilid, Xarelto or Coumadin, let the doctor's office know.      HOLD XARELTO 2 days prior procedure date       Please hold any GLP-1 medications prior to the procedure: Dulaglutide Trulicity(hold week prior), Exenatide Byetta (hold the morning of procedure), Semaglutide Ozempic (hold week prior), Liraglutide Victoza, Saxenda(hold week prior), Lixisenatide Adlyxin (hold the morning of procedure), Semaglutide Rybelsus (hold the morning of procedure), Tirzepatide Mounjaro (hold week prior)     DON'T: On the morning of the test do not take insulin or pills for diabetes.     DO: On the morning of the test, do take any pills for blood pressure, heart, anti-rejection and or seizures with a small sip of water. Bring any inhalers with you.    To have a good prep, you must follow these instructions - please do not use the directions from the pharmacy.    The doctor will send a prescription for the SUPREP.      The Day Before the test:    You can only drink CLEAR LIQUIDS the whole day before your test.  You can't eat any food for the whole day.    You CAN have:  Water, Coffee or decaf coffee (no milk or cream)  Tea  Soft drinks - regular and sugar free  Jello (green or  yellow)  Apple Juice, white grape juice, white cranberry juice  Gatorade, Power Aid, Crystal Light, Jeff Aid  Lemonade and Limeade  Bouillon, clear soup  Snowball, popsicles  YOU CAN'T DRINK ANYTHING RED, PURPLE ORANGE OR BLUE   YOU CAN'T DRINK ALCOHOL  ONLY DRINK WHAT IS ON THE LIST      At 5 pm the night before your test:    Pour the 1st bottle of SUPREP into the cup provided in the box. Add water to the line on the cup and mix well.  Drink the whole cup and then drink 2 more full cups of water over 1 hour.  This can be easier to drink if it is cold. You can mix it 20 minutes ahead of time and place in the refrigerator before you drink it.  You must drink it within 30-45 minutes of mixing it.  Do NOT pour the drink over ice.  You can drink it with a straw.    The Day of the test - We will call you 2 days before your test to tell you what time to get there.    5 hours before you come to the hospital (this may be in the middle of the night)  Pour the 2nd bottle of SUPREP into the cup provided in the box. Add water to the line on the cup and mix well.  Drink the whole cup and then drink 2 more full cups of water over 1 hour.  It might be easier to drink if it is cold. You can mix it 20 minutes ahead of time and place in the refrigerator before you drink it.  You must drink it within 30-45 minutes of mixing it.  Do NOT pour the drink over ice.  You can drink it with a straw.    YOU CAN'T EAT OR DRINK ANYTHING ELSE ONCE YOU FINISH THE PREP    Leave all valuables and jewelry at home. You will be at the hospital for 2-4 hours.    Call the Endoscopy department at [phone] with any questions about your procedure.

## 2024-04-19 NOTE — PROGRESS NOTES
I assume primary medical responsibility for this patient. I have reviewed the history, physical, and assessement & treatment plan with the resident and agree that the care is reasonable and necessary. This service has been performed by a resident without the presence of a teaching physician under the primary care exception. If necessary, an addendum of additional findings or evaluation beyond the resident documentation will be noted below.     Katerine Pratt MD

## 2024-05-01 ENCOUNTER — TELEPHONE (OUTPATIENT)
Dept: ENDOSCOPY | Facility: HOSPITAL | Age: 79
End: 2024-05-01
Payer: MEDICARE

## 2024-05-02 ENCOUNTER — OFFICE VISIT (OUTPATIENT)
Dept: FAMILY MEDICINE | Facility: HOSPITAL | Age: 79
End: 2024-05-02
Payer: MEDICARE

## 2024-05-02 VITALS
SYSTOLIC BLOOD PRESSURE: 168 MMHG | WEIGHT: 199.06 LBS | DIASTOLIC BLOOD PRESSURE: 83 MMHG | HEART RATE: 99 BPM | OXYGEN SATURATION: 97 % | BODY MASS INDEX: 27.87 KG/M2 | HEIGHT: 71 IN

## 2024-05-02 DIAGNOSIS — I10 PRIMARY HYPERTENSION: Primary | ICD-10-CM

## 2024-05-02 DIAGNOSIS — I48.0 PAF (PAROXYSMAL ATRIAL FIBRILLATION): ICD-10-CM

## 2024-05-02 PROCEDURE — 99214 OFFICE O/P EST MOD 30 MIN: CPT | Performed by: STUDENT IN AN ORGANIZED HEALTH CARE EDUCATION/TRAINING PROGRAM

## 2024-05-02 RX ORDER — CARVEDILOL 3.12 MG/1
3.12 TABLET ORAL 2 TIMES DAILY WITH MEALS
Qty: 180 TABLET | Refills: 3 | Status: SHIPPED | OUTPATIENT
Start: 2024-05-02 | End: 2025-05-02

## 2024-05-02 NOTE — PROGRESS NOTES
Subjective:      Patient ID: Dejuan Correa is a 79 y.o. male.    Chief Complaint: Follow-up    Patient, Sunny Zaidi, is a 79-year-old male with past medical history of alcohol use disorder, currently abstinent for over a year, AFib w prior CVA, and OA of the left knee, for which he does regular physical therapy. Patient is here for follow-up on hypertension. Patient has tried many different antihypertensives in the past and was started on a combo amlodipine-10/ benazepril-20 daily and asked to journal daily BP logs. BP logs show BP regularly above 180/100 despite being adherent to current regimen. Pt states he also occasionally feels a flutter or skipped heartbeats, but has not had any weakness, LOC, chest pain (states he does occasionally have some shoulder pain but that it might be related to his PT), lightheadness, or difficulty breathing. Patient's mobility has improved significantly since CVA. No residual weakness. Mobility only limited by pain in the knee secondary to OA. Patient is seeing Orthopedics and PT for this problem. No other acute issues noted at this visit.      Review of Systems   Constitutional:  Negative for activity change, appetite change, chills, fatigue, fever and unexpected weight change.   HENT: Negative.     Respiratory:  Negative for cough, chest tightness and shortness of breath.    Cardiovascular:  Positive for palpitations. Negative for leg swelling.   Gastrointestinal: Negative.    Genitourinary: Negative.    Musculoskeletal:  Positive for arthralgias (Left knee, Left shoulder).   Skin: Negative.    Neurological: Negative.    Psychiatric/Behavioral: Negative.          Past Medical History:   Diagnosis Date    Atrial fibrillation     Hypertension     Irregular heart beat     Supraventricular tachycardia        No past surgical history on file.    No family history on file.    Social History     Socioeconomic History    Marital status: Single   Tobacco Use    Smoking status: Former     Smokeless tobacco: Never   Substance and Sexual Activity    Alcohol use: Not Currently     Comment: severe alcohol use disorder in early remission (7 days)    Sexual activity: Not Currently       Current Outpatient Medications   Medication Sig Dispense Refill    acetaminophen (TYLENOL) 325 MG tablet Take 650 mg by mouth every 6 (six) hours as needed.      allopurinoL (ZYLOPRIM) 100 MG tablet Take 1 tablet (100 mg total) by mouth once daily. 30 tablet 11    amlodipine-benazepril 10-20mg (LOTREL) 10-20 mg per capsule Take 1 capsule by mouth once daily. 90 capsule 3    aspirin (ECOTRIN) 81 MG EC tablet Take 1 tablet (81 mg total) by mouth once daily. 90 tablet 3    atorvastatin (LIPITOR) 40 MG tablet Take 1 tablet (40 mg total) by mouth once daily. 90 tablet 3    colchicine, gout, (COLCRYS) 0.6 mg tablet Take 1 tablet (0.6 mg total) by mouth 2 (two) times daily as needed (Gout attack). Take at symptom onset for 4 days or until symptoms improve 20 tablet 0    diclofenac sodium (VOLTAREN) 1 % Gel Apply topically 4 (four) times daily.      docusate sodium (COLACE) 100 MG capsule Take 100 mg by mouth.      folic acid (FOLVITE) 1 MG tablet Take 1 tablet (1 mg total) by mouth once daily. 30 tablet 11    rivaroxaban (XARELTO) 20 mg Tab Take 1 tablet (20 mg total) by mouth Daily. 90 tablet 1    sodium,potassium,mag sulfates (SUPREP BOWEL PREP KIT) 17.5-3.13-1.6 gram SolR Take 177 mLs by mouth once daily. 1 kit 0    tadalafiL (CIALIS) 5 MG tablet Take 1 tablet (5 mg total) by mouth daily as needed for Erectile Dysfunction. 30 tablet 0    carvediloL (COREG) 3.125 MG tablet Take 1 tablet (3.125 mg total) by mouth 2 (two) times daily with meals. 180 tablet 3     No current facility-administered medications for this visit.       Review of patient's allergies indicates:   Allergen Reactions    Heparin     Heparin analogues        Objective:     Vitals:    05/02/24 1448   BP: (!) 168/83   Pulse: 99     Physical Exam  Vitals and  nursing note reviewed.   Constitutional:       Appearance: Normal appearance.   HENT:      Head: Normocephalic and atraumatic.   Eyes:      Pupils: Pupils are equal, round, and reactive to light.   Cardiovascular:      Rate and Rhythm: Normal rate and regular rhythm.      Heart sounds: Normal heart sounds.      Comments: Heart does have some skipped beats   Pulmonary:      Breath sounds: Normal breath sounds.   Abdominal:      General: Abdomen is flat.      Palpations: Abdomen is soft.   Musculoskeletal:         General: No swelling or tenderness.      Cervical back: Normal range of motion.   Skin:     General: Skin is warm.   Neurological:      General: No focal deficit present.      Mental Status: He is alert.   Psychiatric:         Mood and Affect: Mood normal.         Assessment:      1. Primary hypertension    2. PAF (paroxysmal atrial fibrillation)      Plan:     Hypertension is still poorly controlled with current regimen. Pt states he is adherent to the regimen. Pt has been on many antihypertensives previously, and it is unclear why some medications were changed or discontinued previously. Will resume Beta blocker, Carvedilol, today at low dose and will titrate as necessary at subsequent visits. Can reassess BP and if still hypertensive, consider other medications, such as MRA in future.    Patient had skipped beats on exam and there was concern for possible Afib vs Sinus rhythm with ectopic beats so ECG was ordered to rule out Afib. Pt was in sinus rhythm and not Afib.    Primary hypertension  -     carvediloL (COREG) 3.125 MG tablet; Take 1 tablet (3.125 mg total) by mouth 2 (two) times daily with meals.  Dispense: 180 tablet; Refill: 3  -     EKG RHYTHM STRIP (to Muse); Future; Expected date: 05/02/2024    PAF (paroxysmal atrial fibrillation)  -     carvediloL (COREG) 3.125 MG tablet; Take 1 tablet (3.125 mg total) by mouth 2 (two) times daily with meals.  Dispense: 180 tablet; Refill: 3  -     EKG RHYTHM  STRIP (to Muse); Future; Expected date: 05/02/2024  -     EKG 12-lead; Future      Follow up in about 1 month (around 6/2/2024).

## 2024-05-06 ENCOUNTER — TELEPHONE (OUTPATIENT)
Dept: ENDOSCOPY | Facility: HOSPITAL | Age: 79
End: 2024-05-06
Payer: MEDICARE

## 2024-05-06 NOTE — TELEPHONE ENCOUNTER
Returned pts call to confirm colonoscopy procedure and arrival times. Pt verbalized understanding.

## 2024-05-07 ENCOUNTER — ANESTHESIA (OUTPATIENT)
Dept: ENDOSCOPY | Facility: HOSPITAL | Age: 79
End: 2024-05-07
Payer: MEDICARE

## 2024-05-07 ENCOUNTER — HOSPITAL ENCOUNTER (OUTPATIENT)
Facility: HOSPITAL | Age: 79
Discharge: HOME | End: 2024-05-07
Attending: INTERNAL MEDICINE | Admitting: INTERNAL MEDICINE
Payer: MEDICARE

## 2024-05-07 ENCOUNTER — ANESTHESIA EVENT (OUTPATIENT)
Dept: ENDOSCOPY | Facility: HOSPITAL | Age: 79
End: 2024-05-07
Payer: MEDICARE

## 2024-05-07 VITALS
SYSTOLIC BLOOD PRESSURE: 119 MMHG | TEMPERATURE: 98 F | HEIGHT: 72 IN | RESPIRATION RATE: 12 BRPM | OXYGEN SATURATION: 98 % | BODY MASS INDEX: 26.95 KG/M2 | WEIGHT: 199 LBS | DIASTOLIC BLOOD PRESSURE: 58 MMHG | HEART RATE: 73 BPM

## 2024-05-07 DIAGNOSIS — D50.9 IDA (IRON DEFICIENCY ANEMIA): ICD-10-CM

## 2024-05-07 PROCEDURE — 43251 EGD REMOVE LESION SNARE: CPT | Mod: 51,,, | Performed by: INTERNAL MEDICINE

## 2024-05-07 PROCEDURE — 27201089 HC SNARE, DISP (ANY): Performed by: INTERNAL MEDICINE

## 2024-05-07 PROCEDURE — D9220A PRA ANESTHESIA: Mod: CRNA,,, | Performed by: NURSE ANESTHETIST, CERTIFIED REGISTERED

## 2024-05-07 PROCEDURE — 88342 IMHCHEM/IMCYTCHM 1ST ANTB: CPT | Mod: 26,,, | Performed by: STUDENT IN AN ORGANIZED HEALTH CARE EDUCATION/TRAINING PROGRAM

## 2024-05-07 PROCEDURE — 25000003 PHARM REV CODE 250: Performed by: INTERNAL MEDICINE

## 2024-05-07 PROCEDURE — 43239 EGD BIOPSY SINGLE/MULTIPLE: CPT | Mod: 59,,, | Performed by: INTERNAL MEDICINE

## 2024-05-07 PROCEDURE — 37000009 HC ANESTHESIA EA ADD 15 MINS: Performed by: INTERNAL MEDICINE

## 2024-05-07 PROCEDURE — 43239 EGD BIOPSY SINGLE/MULTIPLE: CPT | Mod: 59 | Performed by: INTERNAL MEDICINE

## 2024-05-07 PROCEDURE — 37000008 HC ANESTHESIA 1ST 15 MINUTES: Performed by: INTERNAL MEDICINE

## 2024-05-07 PROCEDURE — 63600175 PHARM REV CODE 636 W HCPCS: Performed by: NURSE ANESTHETIST, CERTIFIED REGISTERED

## 2024-05-07 PROCEDURE — D9220A PRA ANESTHESIA: Mod: ANES,,, | Performed by: ANESTHESIOLOGY

## 2024-05-07 PROCEDURE — 88305 TISSUE EXAM BY PATHOLOGIST: CPT | Performed by: STUDENT IN AN ORGANIZED HEALTH CARE EDUCATION/TRAINING PROGRAM

## 2024-05-07 PROCEDURE — 43251 EGD REMOVE LESION SNARE: CPT | Performed by: INTERNAL MEDICINE

## 2024-05-07 PROCEDURE — 88305 TISSUE EXAM BY PATHOLOGIST: CPT | Mod: 26,,, | Performed by: STUDENT IN AN ORGANIZED HEALTH CARE EDUCATION/TRAINING PROGRAM

## 2024-05-07 PROCEDURE — 45385 COLONOSCOPY W/LESION REMOVAL: CPT | Performed by: INTERNAL MEDICINE

## 2024-05-07 PROCEDURE — 45385 COLONOSCOPY W/LESION REMOVAL: CPT | Mod: ,,, | Performed by: INTERNAL MEDICINE

## 2024-05-07 PROCEDURE — 27201012 HC FORCEPS, HOT/COLD, DISP: Performed by: INTERNAL MEDICINE

## 2024-05-07 PROCEDURE — 25000003 PHARM REV CODE 250: Performed by: NURSE ANESTHETIST, CERTIFIED REGISTERED

## 2024-05-07 PROCEDURE — 88342 IMHCHEM/IMCYTCHM 1ST ANTB: CPT | Performed by: STUDENT IN AN ORGANIZED HEALTH CARE EDUCATION/TRAINING PROGRAM

## 2024-05-07 RX ORDER — LIDOCAINE HYDROCHLORIDE 20 MG/ML
INJECTION INTRAVENOUS
Status: DISCONTINUED | OUTPATIENT
Start: 2024-05-07 | End: 2024-05-07

## 2024-05-07 RX ORDER — PROPOFOL 10 MG/ML
VIAL (ML) INTRAVENOUS
Status: DISCONTINUED | OUTPATIENT
Start: 2024-05-07 | End: 2024-05-07

## 2024-05-07 RX ORDER — PHENYLEPHRINE HYDROCHLORIDE 10 MG/ML
INJECTION INTRAVENOUS
Status: DISCONTINUED | OUTPATIENT
Start: 2024-05-07 | End: 2024-05-07

## 2024-05-07 RX ORDER — SODIUM CHLORIDE 9 MG/ML
INJECTION, SOLUTION INTRAVENOUS CONTINUOUS
Status: DISCONTINUED | OUTPATIENT
Start: 2024-05-07 | End: 2024-05-07 | Stop reason: HOSPADM

## 2024-05-07 RX ORDER — PROPOFOL 10 MG/ML
VIAL (ML) INTRAVENOUS CONTINUOUS PRN
Status: DISCONTINUED | OUTPATIENT
Start: 2024-05-07 | End: 2024-05-07

## 2024-05-07 RX ADMIN — GLYCOPYRROLATE 0.1 MG: 0.2 INJECTION, SOLUTION INTRAMUSCULAR; INTRAVITREAL at 11:05

## 2024-05-07 RX ADMIN — Medication 20 MG: at 11:05

## 2024-05-07 RX ADMIN — PROPOFOL 75 MCG/KG/MIN: 10 INJECTION, EMULSION INTRAVENOUS at 11:05

## 2024-05-07 RX ADMIN — Medication 40 MG: at 11:05

## 2024-05-07 RX ADMIN — PHENYLEPHRINE HYDROCHLORIDE 150 MCG: 10 INJECTION INTRAVENOUS at 11:05

## 2024-05-07 RX ADMIN — SODIUM CHLORIDE: 9 INJECTION, SOLUTION INTRAVENOUS at 10:05

## 2024-05-07 RX ADMIN — LIDOCAINE HYDROCHLORIDE 75 MG: 20 INJECTION, SOLUTION INTRAVENOUS at 11:05

## 2024-05-07 NOTE — PROVATION PATIENT INSTRUCTIONS
Discharge Summary/Instructions after an Endoscopic Procedure  Patient Name: Dejuan Correa  Patient MRN: [id number]  Patient YOB: 1945  Tuesday, May 7, 2024  Manolo Castaneda MD  Dear patient,  As a result of recent federal legislation (The Federal Cures Act), you may   receive lab or pathology results from your procedure in your [facility]   account before your physician is able to contact you. Your physician or   their representative will relay the results to you with their   recommendations at their soonest availability.  Thank you,  Your health is very important to us during the Covid Crisis. Following your   procedure today, you will receive a daily text for 2 weeks asking about   signs or symptoms of Covid 19.  Please respond to this text when you   receive it so we can follow up and keep you as safe as possible.   RESTRICTIONS:  During your procedure today, you received medications for sedation.  These   medications may affect your judgment, balance and coordination.  Therefore,   for 24 hours, you have the following restrictions:   - DO NOT drive a car, operate machinery, make legal/financial decisions,   sign important papers or drink alcohol.    ACTIVITY:  Today: no heavy lifting, straining or running due to procedural   sedation/anesthesia.  The following day: return to full activity including work.  DIET:  Eat and drink normally unless instructed otherwise.     TREATMENT FOR COMMON SIDE EFFECTS:  - Mild abdominal pain, nausea, belching, bloating or excessive gas:  rest,   eat lightly and use a heating pad.  - Sore Throat: treat with throat lozenges and/or gargle with warm salt   water.  - Because air was used during the procedure, expelling large amounts of air   from your rectum or belching is normal.  - If a bowel prep was taken, you may not have a bowel movement for 1-3 days.    This is normal.  SYMPTOMS TO WATCH FOR AND REPORT TO YOUR PHYSICIAN:  1. Abdominal pain or bloating, other  than gas cramps.  2. Chest pain.  3. Back pain.  4. Signs of infection such as: chills or fever occurring within 24 hours   after the procedure.  5. Rectal bleeding, which would show as bright red, maroon, or black stools.   (A tablespoon of blood from the rectum is not serious, especially if   hemorrhoids are present.)  6. Vomiting.  7. Weakness or dizziness.  GO DIRECTLY TO THE NEAREST EMERGENCY ROOM IF YOU HAVE ANY OF THE FOLLOWING:      Difficulty breathing              Chills and/or fever over 101 F   Persistent vomiting and/or vomiting blood   Severe abdominal pain   Severe chest pain   Black, tarry stools   Bleeding- more than one tablespoon   Any other symptom or condition that you feel may need urgent attention  Your doctor recommends these additional instructions:  If any biopsies were taken, your doctors clinic will contact you in 1 to 2   weeks with any results.  - Discharge patient to home.   - Resume previous diet.   - Continue present medications.   - Await pathology results.   - Repeat colonoscopy is not recommended due to current age (66 years or   older) for surveillance.   - Return to nurse practitioner PRN.   - Patient has a contact number available for emergencies.  The signs and   symptoms of potential delayed complications were discussed with the   patient.  Return to normal activities tomorrow.  Written discharge   instructions were provided to the patient.  For questions, problems or results please call your physician - Manolo Castaneda MD.  EMERGENCY PHONE NUMBER: [phone],  LAB RESULTS: [phone]  IF A COMPLICATION OR EMERGENCY SITUATION ARISES AND YOU ARE UNABLE TO REACH   YOUR PHYSICIAN - GO DIRECTLY TO THE EMERGENCY ROOM.  Manolo Castaneda MD  5/7/2024 12:08:56 PM  This report has been verified and signed electronically.  Dear patient,  As a result of recent federal legislation (The Federal Cures Act), you may   receive lab or pathology results from your  procedure in your [facility]   account before your physician is able to contact you. Your physician or   their representative will relay the results to you with their   recommendations at their soonest availability.  Thank you,  PROVATION

## 2024-05-07 NOTE — TRANSFER OF CARE
Anesthesia Transfer of Care Note    Patient: Dejuan Correa    Procedure(s) Performed: Procedure(s) (LRB):  COLONOSCOPY (N/A)  EGD (ESOPHAGOGASTRODUODENOSCOPY) (N/A)    Patient location: GI    Anesthesia Type: general    Transport from OR: Transported from OR on room air with adequate spontaneous ventilation    Post pain: adequate analgesia    Post assessment: no apparent anesthetic complications and tolerated procedure well    Post vital signs: stable    Level of consciousness: responds to stimulation and sedated    Nausea/Vomiting: no nausea/vomiting    Complications: none    Transfer of care protocol was followed      Last vitals: Visit Vitals  BP (!) 175/80 (BP Location: Left arm, Patient Position: Lying)   Pulse 63   Temp 36.7 °C (98.1 °F) (Skin)   Resp 18   Ht 6' (1.829 m)   Wt 90.3 kg (199 lb)   SpO2 98%   BMI 26.99 kg/m²

## 2024-05-07 NOTE — ANESTHESIA PREPROCEDURE EVALUATION
05/07/2024  Dejuan Correa is a 79 y.o., male.      Pre-op Assessment    I have reviewed the Patient Summary Reports.     I have reviewed the Nursing Notes. I have reviewed the NPO Status.   I have reviewed the Medications.     Review of Systems  Anesthesia Hx:  No problems with previous Anesthesia                Social:  Former Smoker, No Alcohol Use No ETOH in over a year\  Previous heavy drinker      Hematology/Oncology:    Oncology Normal                                   EENT/Dental:  EENT/Dental Normal           Cardiovascular:     Hypertension    Dysrhythmias atrial fibrillation          History of A-fib, currently in NSR    Last xarelto may 4 2024                         Pulmonary:  Pulmonary Normal                       Renal/:  Renal/ Normal                 Hepatic/GI:  Hepatic/GI Normal                 Neurological:   CVA, no residual symptoms        CVA approximately 1 yr ago                            Endocrine:  Endocrine Normal            Psych:  Psychiatric Normal                    Physical Exam  General: Well nourished, Cooperative, Alert and Oriented    Airway:  Mallampati: II / II  Mouth Opening: Normal  TM Distance: Normal  Tongue: Normal  Neck ROM: Normal ROM    Dental:  Dentures    Chest/Lungs:  Clear to auscultation, Normal Respiratory Rate    Heart:  Rate: Normal  Rhythm: Regular Rhythm  Sounds: Normal        Anesthesia Plan  Type of Anesthesia, risks & benefits discussed:    Anesthesia Type: Gen Natural Airway, MAC  Intra-op Monitoring Plan: Standard ASA Monitors  Induction:  IV  Informed Consent: Informed consent signed with the Patient and all parties understand the risks and agree with anesthesia plan.  All questions answered.   ASA Score: 3  Day of Surgery Review of History & Physical: H&P Update referred to the surgeon/provider.    Ready For Surgery From Anesthesia  Perspective.     .

## 2024-05-07 NOTE — ANESTHESIA POSTPROCEDURE EVALUATION
Anesthesia Post Evaluation    Patient: Dejuan Correa    Procedure(s) Performed: Procedure(s) (LRB):  COLONOSCOPY (N/A)  EGD (ESOPHAGOGASTRODUODENOSCOPY) (N/A)    Final Anesthesia Type: general      Patient location during evaluation: PACU  Patient participation: Yes- Able to Participate  Level of consciousness: awake and alert  Post-procedure vital signs: reviewed and stable  Pain management: adequate  Airway patency: patent    PONV status at discharge: No PONV  Anesthetic complications: no      Respiratory status: spontaneous ventilation and room air  Hydration status: euvolemic  Follow-up not needed.              Vitals Value Taken Time   /58 05/07/24 1229   Temp 36.8 °C (98.2 °F) 05/07/24 1155   Pulse 73 05/07/24 1229   Resp 12 05/07/24 1229   SpO2 98 % 05/07/24 1229         Event Time   Out of Recovery 12:30:35         Pain/Sharmila Score: Sharmila Score: 10 (5/7/2024 12:08 PM)

## 2024-05-07 NOTE — PROVATION PATIENT INSTRUCTIONS
Discharge Summary/Instructions after an Endoscopic Procedure  Patient Name: Dejuan Correa  Patient MRN: [id number]  Patient YOB: 1945  Tuesday, May 7, 2024  Manolo Castaneda MD  Dear patient,  As a result of recent federal legislation (The Federal Cures Act), you may   receive lab or pathology results from your procedure in your [facility]   account before your physician is able to contact you. Your physician or   their representative will relay the results to you with their   recommendations at their soonest availability.  Thank you,  Your health is very important to us during the Covid Crisis. Following your   procedure today, you will receive a daily text for 2 weeks asking about   signs or symptoms of Covid 19.  Please respond to this text when you   receive it so we can follow up and keep you as safe as possible.   RESTRICTIONS:  During your procedure today, you received medications for sedation.  These   medications may affect your judgment, balance and coordination.  Therefore,   for 24 hours, you have the following restrictions:   - DO NOT drive a car, operate machinery, make legal/financial decisions,   sign important papers or drink alcohol.    ACTIVITY:  Today: no heavy lifting, straining or running due to procedural   sedation/anesthesia.  The following day: return to full activity including work.  DIET:  Eat and drink normally unless instructed otherwise.     TREATMENT FOR COMMON SIDE EFFECTS:  - Mild abdominal pain, nausea, belching, bloating or excessive gas:  rest,   eat lightly and use a heating pad.  - Sore Throat: treat with throat lozenges and/or gargle with warm salt   water.  - Because air was used during the procedure, expelling large amounts of air   from your rectum or belching is normal.  - If a bowel prep was taken, you may not have a bowel movement for 1-3 days.    This is normal.  SYMPTOMS TO WATCH FOR AND REPORT TO YOUR PHYSICIAN:  1. Abdominal pain or bloating, other  than gas cramps.  2. Chest pain.  3. Back pain.  4. Signs of infection such as: chills or fever occurring within 24 hours   after the procedure.  5. Rectal bleeding, which would show as bright red, maroon, or black stools.   (A tablespoon of blood from the rectum is not serious, especially if   hemorrhoids are present.)  6. Vomiting.  7. Weakness or dizziness.  GO DIRECTLY TO THE NEAREST EMERGENCY ROOM IF YOU HAVE ANY OF THE FOLLOWING:      Difficulty breathing              Chills and/or fever over 101 F   Persistent vomiting and/or vomiting blood   Severe abdominal pain   Severe chest pain   Black, tarry stools   Bleeding- more than one tablespoon   Any other symptom or condition that you feel may need urgent attention  Your doctor recommends these additional instructions:  If any biopsies were taken, your doctors clinic will contact you in 1 to 2   weeks with any results.  - Discharge patient to home.   - Resume previous diet.   - Continue present medications.   - Await pathology results.   - Perform a colonoscopy as previously scheduled.  For questions, problems or results please call your physician - Manolo Castaneda MD.  EMERGENCY PHONE NUMBER: [phone],  LAB RESULTS: [phone]  IF A COMPLICATION OR EMERGENCY SITUATION ARISES AND YOU ARE UNABLE TO REACH   YOUR PHYSICIAN - GO DIRECTLY TO THE EMERGENCY ROOM.  Manolo Castaneda MD  5/7/2024 11:30:33 AM  This report has been verified and signed electronically.  Dear patient,  As a result of recent federal legislation (The Federal Cures Act), you may   receive lab or pathology results from your procedure in your [facility]   account before your physician is able to contact you. Your physician or   their representative will relay the results to you with their   recommendations at their soonest availability.  Thank you,  PROVATION

## 2024-05-07 NOTE — H&P
Short Stay Endoscopy History and Physical    PCP - Mike Morley MD    Procedure - EGD/colonoscopy  ASA - III  Mallampati - per anesthesia  History of Anesthesia problems - no  Family history Anesthesia problems - no     HPI:  This is a 79 y.o. male here for evaluation of : Anemia. CRC screen    ROS:  Constitutional: No fevers, chills, No weight loss  ENT: No allergies  CV: No chest pain  Pulm: No shortness of breath  GI: see HPI  Derm: No rash    Medical History:  has a past medical history of Atrial fibrillation, Hypertension, Irregular heart beat, and Supraventricular tachycardia.    Surgical History:  has no past surgical history on file.    Family History: family history is not on file.. Otherwise no colon cancer, inflammatory bowel disease, or GI malignancies.    Social History:  reports that he has quit smoking. He has never used smokeless tobacco. He reports that he does not currently use alcohol.    Review of patient's allergies indicates:   Allergen Reactions    Heparin     Heparin analogues        Medications:   Medications Prior to Admission   Medication Sig Dispense Refill Last Dose    acetaminophen (TYLENOL) 325 MG tablet Take 650 mg by mouth every 6 (six) hours as needed.       allopurinoL (ZYLOPRIM) 100 MG tablet Take 1 tablet (100 mg total) by mouth once daily. 30 tablet 11     amlodipine-benazepril 10-20mg (LOTREL) 10-20 mg per capsule Take 1 capsule by mouth once daily. 90 capsule 3     aspirin (ECOTRIN) 81 MG EC tablet Take 1 tablet (81 mg total) by mouth once daily. 90 tablet 3     atorvastatin (LIPITOR) 40 MG tablet Take 1 tablet (40 mg total) by mouth once daily. 90 tablet 3     carvediloL (COREG) 3.125 MG tablet Take 1 tablet (3.125 mg total) by mouth 2 (two) times daily with meals. 180 tablet 3     colchicine, gout, (COLCRYS) 0.6 mg tablet Take 1 tablet (0.6 mg total) by mouth 2 (two) times daily as needed (Gout attack). Take at symptom onset for 4 days or until symptoms improve 20 tablet 0      diclofenac sodium (VOLTAREN) 1 % Gel Apply topically 4 (four) times daily.       docusate sodium (COLACE) 100 MG capsule Take 100 mg by mouth.       folic acid (FOLVITE) 1 MG tablet Take 1 tablet (1 mg total) by mouth once daily. 30 tablet 11     rivaroxaban (XARELTO) 20 mg Tab Take 1 tablet (20 mg total) by mouth Daily. 90 tablet 1     sodium,potassium,mag sulfates (SUPREP BOWEL PREP KIT) 17.5-3.13-1.6 gram SolR Take 177 mLs by mouth once daily. 1 kit 0     tadalafiL (CIALIS) 5 MG tablet Take 1 tablet (5 mg total) by mouth daily as needed for Erectile Dysfunction. 30 tablet 0          Objective Findings:    Vital Signs: see nursing notes  Physical Exam:  General Appearance: Well appearing in no acute distress  Eyes:    No scleral icterus  ENT: Neck supple  Lungs: CTA anteriorly  Heart:  S1, S2 normal, no murmurs heard  Abdomen: Soft, non tender, non distended with positive bowel sounds. No hepatosplenomegaly, ascites, or mass  Extremities: no edema  Skin: No rash      Labs:  Lab Results   Component Value Date    WBC 6.38 03/26/2024    HGB 11.3 (L) 03/26/2024    HCT 35.6 (L) 03/26/2024     03/26/2024    CHOL 111 (L) 01/30/2024    TRIG 81 01/30/2024    HDL 35 (L) 01/30/2024    ALT 13 03/26/2024    AST 29 03/26/2024     03/26/2024    K 3.7 03/26/2024     03/26/2024    CREATININE 1.0 03/26/2024    BUN 9 03/26/2024    CO2 27 03/26/2024    TSH 2.920 06/04/2023    INR 1.2 06/05/2023    HGBA1C 5.2 06/06/2023       I have explained the risks and benefits of endoscopy procedures to the patient including but not limited to bleeding, perforation, infection, and death.    Manolo Castaneda MD

## 2024-05-10 LAB
FINAL PATHOLOGIC DIAGNOSIS: NORMAL
GROSS: NORMAL
Lab: NORMAL

## 2024-05-13 NOTE — PROGRESS NOTES
Pathologic findings for recent EGD reviewed.  No evidence of H pylori infection identified.  Some mild inflammation was noted.  Continue current medications.      Polyps removed from stomach and colon benign

## 2024-05-15 NOTE — PROGRESS NOTES
I assume primary medical responsibility for this patient. I have reviewed the history, physical, and assessement & treatment plan with the resident and agree that the care is reasonable and necessary. This service has been performed by a resident with the presence of a teaching physician for the key parts of the history/exam. If necessary, an addendum of additional findings or evaluation beyond the resident documentation will be noted below.     Katerine Pratt MD

## 2024-05-17 ENCOUNTER — TELEPHONE (OUTPATIENT)
Dept: GASTROENTEROLOGY | Facility: CLINIC | Age: 79
End: 2024-05-17
Payer: MEDICARE

## 2024-05-17 NOTE — TELEPHONE ENCOUNTER
Reviewed test results. Verbal understanding.     ----- Message from Manolo Castaneda MD sent at 5/13/2024  1:59 PM CDT -----  Pathologic findings for recent EGD reviewed.  No evidence of H pylori infection identified.  Some mild inflammation was noted.  Continue current medications.      Polyps removed from stomach and colon benign

## 2024-05-23 ENCOUNTER — OFFICE VISIT (OUTPATIENT)
Dept: HEPATOLOGY | Facility: CLINIC | Age: 79
End: 2024-05-23
Payer: MEDICARE

## 2024-05-23 ENCOUNTER — PROCEDURE VISIT (OUTPATIENT)
Dept: HEPATOLOGY | Facility: CLINIC | Age: 79
End: 2024-05-23
Payer: MEDICARE

## 2024-05-23 VITALS
HEART RATE: 94 BPM | HEIGHT: 72 IN | DIASTOLIC BLOOD PRESSURE: 78 MMHG | RESPIRATION RATE: 20 BRPM | WEIGHT: 198.88 LBS | BODY MASS INDEX: 26.94 KG/M2 | SYSTOLIC BLOOD PRESSURE: 153 MMHG | OXYGEN SATURATION: 99 %

## 2024-05-23 DIAGNOSIS — E80.6 HYPERBILIRUBINEMIA: ICD-10-CM

## 2024-05-23 DIAGNOSIS — E80.6 HYPERBILIRUBINEMIA: Primary | ICD-10-CM

## 2024-05-23 DIAGNOSIS — K83.8 DILATION OF BILIARY TRACT: ICD-10-CM

## 2024-05-23 PROCEDURE — 3078F DIAST BP <80 MM HG: CPT | Mod: CPTII,S$GLB,, | Performed by: STUDENT IN AN ORGANIZED HEALTH CARE EDUCATION/TRAINING PROGRAM

## 2024-05-23 PROCEDURE — 99203 OFFICE O/P NEW LOW 30 MIN: CPT | Mod: S$GLB,,, | Performed by: STUDENT IN AN ORGANIZED HEALTH CARE EDUCATION/TRAINING PROGRAM

## 2024-05-23 PROCEDURE — 3288F FALL RISK ASSESSMENT DOCD: CPT | Mod: CPTII,S$GLB,, | Performed by: STUDENT IN AN ORGANIZED HEALTH CARE EDUCATION/TRAINING PROGRAM

## 2024-05-23 PROCEDURE — 1157F ADVNC CARE PLAN IN RCRD: CPT | Mod: CPTII,S$GLB,, | Performed by: STUDENT IN AN ORGANIZED HEALTH CARE EDUCATION/TRAINING PROGRAM

## 2024-05-23 PROCEDURE — 99999 PR PBB SHADOW E&M-EST. PATIENT-LVL IV: CPT | Mod: PBBFAC,,, | Performed by: STUDENT IN AN ORGANIZED HEALTH CARE EDUCATION/TRAINING PROGRAM

## 2024-05-23 PROCEDURE — 1160F RVW MEDS BY RX/DR IN RCRD: CPT | Mod: CPTII,S$GLB,, | Performed by: STUDENT IN AN ORGANIZED HEALTH CARE EDUCATION/TRAINING PROGRAM

## 2024-05-23 PROCEDURE — 3077F SYST BP >= 140 MM HG: CPT | Mod: CPTII,S$GLB,, | Performed by: STUDENT IN AN ORGANIZED HEALTH CARE EDUCATION/TRAINING PROGRAM

## 2024-05-23 PROCEDURE — 1125F AMNT PAIN NOTED PAIN PRSNT: CPT | Mod: CPTII,S$GLB,, | Performed by: STUDENT IN AN ORGANIZED HEALTH CARE EDUCATION/TRAINING PROGRAM

## 2024-05-23 PROCEDURE — 1101F PT FALLS ASSESS-DOCD LE1/YR: CPT | Mod: CPTII,S$GLB,, | Performed by: STUDENT IN AN ORGANIZED HEALTH CARE EDUCATION/TRAINING PROGRAM

## 2024-05-23 PROCEDURE — 1159F MED LIST DOCD IN RCRD: CPT | Mod: CPTII,S$GLB,, | Performed by: STUDENT IN AN ORGANIZED HEALTH CARE EDUCATION/TRAINING PROGRAM

## 2024-05-23 PROCEDURE — 91200 LIVER ELASTOGRAPHY: CPT | Mod: S$GLB,,, | Performed by: STUDENT IN AN ORGANIZED HEALTH CARE EDUCATION/TRAINING PROGRAM

## 2024-05-23 NOTE — PROGRESS NOTES
Hepatology Consult Note    Referring provider: Dr. Philip Whiting  PCP: Mike Morley MD    Chief complaint:  Elevated bilirubin    HPI:  Dejuan Correa is a 79 y.o. male who was referred to Hepatology Clinic for elevated bilirubin.    He says that 3 weeks ago he was told that his bilirubin was elevated.  On chart review he has had intermittent hyperbilirubinemia since at least 2021. Ultrasound in April 2024 showed increased echogenicity of the liver suggesting steatosis as well as extrahepatic biliary dilation.   He has otherwise never been told that he had any liver issues.  He reports history of social alcohol use though did drink daily for approximately 3 years.  He quit drinking alcohol 1 year ago.  Previous testing negative for hepatitis C. No known family history of liver disease. He denies signs of decompensated liver disease including no recent abdominal distension, encephalopathy, jaundice, GI bleeding.    Past Medical History:   Diagnosis Date    Atrial fibrillation     Hypertension     Irregular heart beat     Supraventricular tachycardia        Past Surgical History:   Procedure Laterality Date    COLONOSCOPY N/A 5/7/2024    Procedure: COLONOSCOPY;  Surgeon: Manolo Castaneda MD;  Location: [facility];  Service: Endoscopy;  Laterality: N/A;  5/1-[facility]    ESOPHAGOGASTRODUODENOSCOPY N/A 5/7/2024    Procedure: EGD (ESOPHAGOGASTRODUODENOSCOPY);  Surgeon: Manolo Castaneda MD;  Location: [facility];  Service: Endoscopy;  Laterality: N/A;    WRIST SURGERY Right 1999    fell at a restaurant    WRIST SURGERY Left 1989    cyst removal       No family history on file.    Social History     Tobacco Use    Smoking status: Former    Smokeless tobacco: Never   Substance Use Topics    Alcohol use: Not Currently     Comment: severe alcohol abuse hx. stopped May 2023.    Drug use: Never       Current Outpatient Medications   Medication Sig Dispense Refill    acetaminophen (TYLENOL) 325 MG  tablet Take 650 mg by mouth every 6 (six) hours as needed.      allopurinoL (ZYLOPRIM) 100 MG tablet Take 1 tablet (100 mg total) by mouth once daily. 30 tablet 11    amlodipine-benazepril 10-20mg (LOTREL) 10-20 mg per capsule Take 1 capsule by mouth once daily. 90 capsule 3    aspirin (ECOTRIN) 81 MG EC tablet Take 1 tablet (81 mg total) by mouth once daily. 90 tablet 3    atorvastatin (LIPITOR) 40 MG tablet Take 1 tablet (40 mg total) by mouth once daily. 90 tablet 3    carvediloL (COREG) 3.125 MG tablet Take 1 tablet (3.125 mg total) by mouth 2 (two) times daily with meals. 180 tablet 3    colchicine, gout, (COLCRYS) 0.6 mg tablet Take 1 tablet (0.6 mg total) by mouth 2 (two) times daily as needed (Gout attack). Take at symptom onset for 4 days or until symptoms improve 20 tablet 0    diclofenac sodium (VOLTAREN) 1 % Gel Apply topically 4 (four) times daily.      docusate sodium (COLACE) 100 MG capsule Take 100 mg by mouth.      folic acid (FOLVITE) 1 MG tablet Take 1 tablet (1 mg total) by mouth once daily. 30 tablet 11    rivaroxaban (XARELTO) 20 mg Tab Take 1 tablet (20 mg total) by mouth Daily. 90 tablet 1    sodium,potassium,mag sulfates (SUPREP BOWEL PREP KIT) 17.5-3.13-1.6 gram SolR Take 177 mLs by mouth once daily. 1 kit 0    tadalafiL (CIALIS) 5 MG tablet Take 1 tablet (5 mg total) by mouth daily as needed for Erectile Dysfunction. 30 tablet 0     No current facility-administered medications for this visit.       Review of patient's allergies indicates:   Allergen Reactions    Heparin     Heparin analogues        Review of Systems   Constitutional:  Negative for fever and weight loss.   Cardiovascular:  Negative for leg swelling.   Gastrointestinal:  Negative for abdominal pain, blood in stool, constipation, diarrhea, heartburn, melena, nausea and vomiting.       Vitals:    05/23/24 1112   BP: (!) 153/78   Pulse: 94   Resp: 20   SpO2: 99%   Weight: 90.2 kg (198 lb 13.7 oz)   Height: 6' (1.829 m)        Physical Exam  Constitutional:       General: He is not in acute distress.  Eyes:      General: No scleral icterus.  Cardiovascular:      Rate and Rhythm: Normal rate and regular rhythm.   Pulmonary:      Effort: Pulmonary effort is normal. No respiratory distress.   Abdominal:      General: Bowel sounds are normal. There is no distension.      Palpations: Abdomen is soft.      Tenderness: There is no abdominal tenderness. There is no guarding or rebound.   Musculoskeletal:      Right lower leg: No edema.      Left lower leg: No edema.   Skin:     Coloration: Skin is not jaundiced.         LABS: I personally reviewed pertinent laboratory findings.    Lab Results   Component Value Date    WBC 6.38 03/26/2024    HGB 11.3 (L) 03/26/2024    HCT 35.6 (L) 03/26/2024    MCV 84 03/26/2024     03/26/2024       Lab Results   Component Value Date     03/26/2024    K 3.7 03/26/2024     03/26/2024    CO2 27 03/26/2024    BUN 9 03/26/2024    CREATININE 1.0 03/26/2024    CALCIUM 9.6 03/26/2024    ANIONGAP 12 03/26/2024    ESTGFRAFRICA >60 09/30/2021    EGFRNONAA >60 09/30/2021       Lab Results   Component Value Date    ALT 13 03/26/2024    AST 29 03/26/2024    GGT 47 03/26/2024    ALKPHOS 154 (H) 03/26/2024    BILITOT 2.3 (H) 03/26/2024       Lab Results   Component Value Date    HEPCAB Non-reactive 01/30/2024        IMAGING: I personally reviewed imaging studies.      Assessment:  79 y.o. male who was referred to Hepatology Clinic for elevated bilirubin.    He says that 3 weeks ago he was told that his bilirubin was elevated.  On chart review he has had intermittent hyperbilirubinemia since at least 2021.  Other LFTs within normal limits.  Ultrasound in April 2024 showed increased echogenicity of the liver suggesting steatosis as well as extrahepatic biliary dilation.  FibroScan obtained today showing S0 steatosis and F1 fibrosis.    He has an indirect hyperbilirubinemia without anemia to suggest  hemolysis which is consistent with Gilbert's syndrome. I discussed the benign nature of this with him.     1. Hyperbilirubinemia    2. Dilation of biliary tract        Recommendations:  - Obtain MRI/MRCP given biliary dilation. If unremarkable, no long term hepatology follow up needed.    I have sent communication to the referring physician and/or primary care provider.    I spent a total of 30 minutes on the day of the visit. This includes face to face time and non-face to face time preparing to see the patient (eg, review of tests), obtaining and/or reviewing separately obtained history, documenting clinical information in the electronic or other health record, independently interpreting results, and communicating results to the patient/family/caregiver, or care coordination.    This note includes dictation done using [employer] speech recognition program. Word recognition mistakes are occasionally missed on review.    Jaime Quiroz MD  Staff Physician  Hepatology and Liver Transplant  [facility]  [facility] [place]

## 2024-05-27 NOTE — PROCEDURES
FibroScan Transplant Hepatology(Vibration Controlled Transient Elastography)    Date/Time: 5/23/2024 1:15 PM    Performed by: Jaime Quiroz MD  Authorized by: Jaime Quiroz MD    Diagnosis:  Alcohol    Probe:  M    Universal Protocol: Patient's identity, procedure and site were verified, confirmatory pause was performed.  Discussed procedure including risks and potential complications.  Questions answered.  Patient verbalizes understanding and wishes to proceed with VCTE.     Procedure: After providing explanations of the procedure, patient was placed in the supine position with right arm in maximum abduction to allow optimal exposure of right lateral abdomen.  Patient was briefly assessed, Testing was performed in the mid-axillary location, 50Hz Shear Wave pulses were applied and the resulting Shear Wave and Propagation Speed detected with a 3.5 MHz ultrasonic signal, using the FibroScan probe, Skin to liver capsule distance and liver parenchyma were accessed during the entire examination with the FibroScan probe, Patient was instructed to breathe normally and to abstain from sudden movements during the procedure, allowing for random measurements of liver stiffness. At least 10 Shear Waves were produced, Individual measurements of each Shear Wave were calculated.  Patient tolerated the procedure well with no complications.  Meets discharge criteria as was dismissed.  Rates pain 0 out of 10.  Patient will follow up with ordering provider to review results.    Findings  Median liver stiffness score:  8.2  CAP Reading: dB/m:  205    IQR/med %:  13  Interpretation  Fibrosis interpretation is based on medial liver stiffness - Kilopascal (kPa).    Fibrosis Stage:  F 0-1  Steatosis interpretation is based on controlled attenuation parameter - (dB/m).    Steatosis Grade:  <S1

## 2024-06-06 ENCOUNTER — HOSPITAL ENCOUNTER (OUTPATIENT)
Dept: RADIOLOGY | Facility: HOSPITAL | Age: 79
Discharge: HOME | End: 2024-06-06
Attending: STUDENT IN AN ORGANIZED HEALTH CARE EDUCATION/TRAINING PROGRAM
Payer: MEDICARE

## 2024-06-06 DIAGNOSIS — E80.6 HYPERBILIRUBINEMIA: ICD-10-CM

## 2024-06-06 DIAGNOSIS — K83.8 DILATION OF BILIARY TRACT: ICD-10-CM

## 2024-06-21 ENCOUNTER — TELEPHONE (OUTPATIENT)
Dept: HEPATOLOGY | Facility: CLINIC | Age: 79
End: 2024-06-21
Payer: MEDICARE

## 2024-06-21 ENCOUNTER — HOSPITAL ENCOUNTER (OUTPATIENT)
Dept: RADIOLOGY | Facility: HOSPITAL | Age: 79
Discharge: HOME | End: 2024-06-21
Attending: STUDENT IN AN ORGANIZED HEALTH CARE EDUCATION/TRAINING PROGRAM
Payer: MEDICARE

## 2024-06-21 PROCEDURE — A9585 GADOBUTROL INJECTION: HCPCS | Performed by: STUDENT IN AN ORGANIZED HEALTH CARE EDUCATION/TRAINING PROGRAM

## 2024-06-21 PROCEDURE — 25500020 PHARM REV CODE 255: Performed by: STUDENT IN AN ORGANIZED HEALTH CARE EDUCATION/TRAINING PROGRAM

## 2024-06-21 PROCEDURE — 74183 MRI ABD W/O CNTR FLWD CNTR: CPT | Mod: TC

## 2024-06-21 PROCEDURE — 76376 3D RENDER W/INTRP POSTPROCES: CPT | Mod: TC

## 2024-06-21 RX ORDER — GADOBUTROL 604.72 MG/ML
9 INJECTION INTRAVENOUS
Status: COMPLETED | OUTPATIENT
Start: 2024-06-21 | End: 2024-06-21

## 2024-06-21 RX ADMIN — GADOBUTROL 9 ML: 604.72 INJECTION INTRAVENOUS at 11:06

## 2024-06-21 NOTE — TELEPHONE ENCOUNTER
"Patient: Dejuan Correa       MRN: [id number]      : 1945     Age: 79 y.o.  [address] [facility] [address]    Providers: Jaime Quiroz MD    Priority of review: Other    Patient Transplant Status: Hepatology    Reason for presentation: Reassessment    Clinical Summary: 79 y.o. male who was referred to Hepatology Clinic for elevated bilirubin. He has an indirect hyperbilirubinemia consistent with Gilbert's syndrome. Ultrasound in 2024 showed increased echogenicity of the liver suggesting steatosis as well as extrahepatic biliary dilation.  FibroScan showed S0 steatosis and F1 fibrosis.  Given biliary dilation, MRCP obtain showing asymmetric left-sided intrahepatic biliary dilation.  Any concerning findings?    Imaging to be reviewed:  MRI/MRCP 2024    HCC Treatment History: n/a    Platelets:   Lab Results   Component Value Date/Time     2024 02:45 PM     Creatinine:   Lab Results   Component Value Date/Time    CREATININE 1.18 2024 10:05 AM     Bilirubin:   Lab Results   Component Value Date/Time    BILITOT 2.3 (H) 2024 02:45 PM     AFP Last 3 each if available: No results found for: "AFP", "EXTAFP"    MELD: Computed MELD 3.0 unavailable. One or more values for this score either were not found within the given timeframe or did not fit some other criterion.  Computed MELD-Na unavailable. One or more values for this score either were not found within the given timeframe or did not fit some other criterion.      Plan:     Follow-up Provider:   "

## 2024-08-06 ENCOUNTER — OFFICE VISIT (OUTPATIENT)
Dept: FAMILY MEDICINE | Facility: HOSPITAL | Age: 79
End: 2024-08-06
Payer: MEDICARE

## 2024-08-06 VITALS
BODY MASS INDEX: 26.31 KG/M2 | WEIGHT: 194.25 LBS | HEIGHT: 72 IN | DIASTOLIC BLOOD PRESSURE: 72 MMHG | HEART RATE: 79 BPM | SYSTOLIC BLOOD PRESSURE: 110 MMHG

## 2024-08-06 DIAGNOSIS — I95.1 ORTHOSTATIC HYPOTENSION AFTER EXERCISE: Primary | ICD-10-CM

## 2024-08-06 DIAGNOSIS — I10 PRIMARY HYPERTENSION: ICD-10-CM

## 2024-08-06 DIAGNOSIS — Z28.21 IMMUNIZATION DECLINED: ICD-10-CM

## 2024-08-06 DIAGNOSIS — E80.6 HYPERBILIRUBINEMIA: ICD-10-CM

## 2024-08-06 PROCEDURE — 99214 OFFICE O/P EST MOD 30 MIN: CPT

## 2024-08-22 DIAGNOSIS — K83.8 DILATION OF BILIARY TRACT: Primary | ICD-10-CM

## 2024-09-16 DIAGNOSIS — I48.0 PAF (PAROXYSMAL ATRIAL FIBRILLATION): ICD-10-CM

## 2024-09-16 NOTE — TELEPHONE ENCOUNTER
----- Message from Tanya Ibrahim sent at 9/16/2024 12:14 PM CDT -----  Type:  RX Refill Request    Who Called:  Pt   Refill or New Rx: Refill   RX Name and Strength:rivaroxaban (XARELTO) 20 mg Tab  Preferred Pharmacy with phone number:[facility]/pharmacy #[id number] [facility] [address] AT [facility]  Local or Mail Order: Local   Ordering Provider: Morgan   Would the patient rather a call back or a response via [facility]? Call   Best Call Back Number:[phone]   Additional Information:  pt reached out last week

## 2024-09-30 ENCOUNTER — HOSPITAL ENCOUNTER (OUTPATIENT)
Dept: RADIOLOGY | Facility: HOSPITAL | Age: 79
Discharge: HOME | End: 2024-09-30
Attending: STUDENT IN AN ORGANIZED HEALTH CARE EDUCATION/TRAINING PROGRAM
Payer: MEDICARE

## 2024-09-30 DIAGNOSIS — K83.8 DILATION OF BILIARY TRACT: ICD-10-CM

## 2024-09-30 PROCEDURE — 76700 US EXAM ABDOM COMPLETE: CPT | Mod: 26,,, | Performed by: STUDENT IN AN ORGANIZED HEALTH CARE EDUCATION/TRAINING PROGRAM

## 2024-09-30 PROCEDURE — 76700 US EXAM ABDOM COMPLETE: CPT | Mod: TC,PN

## 2024-10-09 ENCOUNTER — TELEPHONE (OUTPATIENT)
Dept: HEPATOLOGY | Facility: CLINIC | Age: 79
End: 2024-10-09
Payer: MEDICARE

## 2024-10-09 NOTE — TELEPHONE ENCOUNTER
Result letter sent  ----- Message from Jaime Quiroz MD sent at 10/9/2024 10:09 AM CDT -----  Mr. Correa did not view portal message about results. Please let him know that his ultrasound showed that his liver and bile ducts appear normal.

## 2024-12-18 ENCOUNTER — HOSPITAL ENCOUNTER (OUTPATIENT)
Dept: CARDIOLOGY | Facility: HOSPITAL | Age: 79
Discharge: HOME | End: 2024-12-18
Payer: MEDICARE

## 2024-12-18 DIAGNOSIS — I10 HYPERTENSION, ESSENTIAL: Primary | ICD-10-CM

## 2024-12-18 DIAGNOSIS — I10 HYPERTENSION, ESSENTIAL: ICD-10-CM

## 2024-12-18 LAB
OHS QRS DURATION: 92 MS
OHS QTC CALCULATION: 416 MS

## 2024-12-18 PROCEDURE — 93010 ELECTROCARDIOGRAM REPORT: CPT | Mod: ,,, | Performed by: INTERNAL MEDICINE

## 2024-12-18 PROCEDURE — 93005 ELECTROCARDIOGRAM TRACING: CPT | Mod: PN

## 2024-12-19 ENCOUNTER — TELEPHONE (OUTPATIENT)
Dept: CARDIOLOGY | Facility: CLINIC | Age: 79
End: 2024-12-19
Payer: MEDICARE

## 2024-12-19 ENCOUNTER — APPOINTMENT (OUTPATIENT)
Dept: LAB | Facility: HOSPITAL | Age: 79
End: 2024-12-19
Payer: MEDICARE

## 2024-12-19 DIAGNOSIS — Z01.818 ENCOUNTER FOR OTHER PREPROCEDURAL EXAMINATION: Primary | ICD-10-CM

## 2024-12-19 LAB
BACTERIA #/AREA URNS AUTO: ABNORMAL /HPF
BILIRUB UR QL STRIP: NEGATIVE
CLARITY UR REFRACT.AUTO: CLEAR
COLOR UR AUTO: YELLOW
GLUCOSE UR QL STRIP: NEGATIVE
HGB UR QL STRIP: NEGATIVE
HYALINE CASTS UR QL AUTO: 0 /LPF
KETONES UR QL STRIP: NEGATIVE
LEUKOCYTE ESTERASE UR QL STRIP: NEGATIVE
MICROSCOPIC COMMENT: ABNORMAL
NITRITE UR QL STRIP: NEGATIVE
PH UR STRIP: 6 [PH] (ref 5–8)
PROT UR QL STRIP: ABNORMAL
RBC #/AREA URNS AUTO: 2 /HPF (ref 0–4)
SP GR UR STRIP: 1.02 (ref 1–1.03)
URN SPEC COLLECT METH UR: ABNORMAL
UROBILINOGEN UR STRIP-ACNC: NEGATIVE EU/DL
WBC #/AREA URNS AUTO: 80 /HPF (ref 0–5)

## 2024-12-19 PROCEDURE — 81000 URINALYSIS NONAUTO W/SCOPE: CPT | Mod: PN

## 2024-12-19 NOTE — TELEPHONE ENCOUNTER
Called pt to let him know that I have a sooner appt for him to see Dr, Sub lvm for pt to call me back       ad        ----- Message from Med Assistant Sosa sent at 12/19/2024 10:15 AM CST -----    ----- Message -----  From: Michelle Henriquez  Sent: 12/19/2024   9:58 AM CST  To: Kitty Buchanan Staff    Type:  Sooner Appointment Request    Caller is requesting a sooner appointment.  Caller declined first available appointment listed below.  Caller will not accept being placed on the waitlist and is requesting a message be sent to doctor.  Name of Caller:pt  When is the first available appointment?01/14/25  Symptoms:Surgery Clearance  Would the patient rather a call back or a response via [facility]? call  Best Call Back Number: [phone]  Additional Information:

## 2025-01-03 ENCOUNTER — OFFICE VISIT (OUTPATIENT)
Dept: FAMILY MEDICINE | Facility: HOSPITAL | Age: 80
End: 2025-01-03
Payer: MEDICARE

## 2025-01-03 VITALS
BODY MASS INDEX: 25.35 KG/M2 | SYSTOLIC BLOOD PRESSURE: 142 MMHG | WEIGHT: 187.19 LBS | HEIGHT: 72 IN | OXYGEN SATURATION: 99 % | HEART RATE: 91 BPM | DIASTOLIC BLOOD PRESSURE: 84 MMHG

## 2025-01-03 DIAGNOSIS — Z01.818 PREOP EXAMINATION: ICD-10-CM

## 2025-01-03 DIAGNOSIS — I10 PRIMARY HYPERTENSION: Primary | ICD-10-CM

## 2025-01-03 DIAGNOSIS — E78.5 HYPERLIPIDEMIA, UNSPECIFIED HYPERLIPIDEMIA TYPE: ICD-10-CM

## 2025-01-03 PROCEDURE — 99214 OFFICE O/P EST MOD 30 MIN: CPT

## 2025-01-03 RX ORDER — ATORVASTATIN CALCIUM 40 MG/1
40 TABLET, FILM COATED ORAL DAILY
Qty: 90 TABLET | Refills: 3 | Status: SHIPPED | OUTPATIENT
Start: 2025-01-03 | End: 2026-01-03

## 2025-01-03 NOTE — PROGRESS NOTES
[facility]    Subjective:     Dejuan Correa is a 79 y.o. year old male with PMHx of HTN, Paroxysmal Afib, who presents to clinic for Pre-op Exam  .  Patient without acute complaints. Will be undergoing Left TKA on 2/5/25. Has had hand surgeries in the past.    Patient Active Problem List    Diagnosis Date Noted    Normocytic anemia 03/26/2024    Overweight (BMI 25.0-29.9) 03/26/2024    Hyperbilirubinemia 03/26/2024    Paroxysmal atrial fibrillation 08/14/2023    Alcohol withdrawal seizure with delirium 05/29/2023    Alcohol use disorder, severe, dependence 05/28/2023    PAF (paroxysmal atrial fibrillation) 10/21/2021    PSVT (paroxysmal supraventricular tachycardia) 10/21/2021    Uncontrolled stage 2 hypertension 10/21/2021    HLD (hyperlipidemia) 10/21/2021        Review of patient's allergies indicates:   Allergen Reactions    Heparin     Heparin analogues         Past Medical History:   Diagnosis Date    Atrial fibrillation     Hypertension     Irregular heart beat     Supraventricular tachycardia       Past Surgical History:   Procedure Laterality Date    COLONOSCOPY N/A 5/7/2024    Procedure: COLONOSCOPY;  Surgeon: Manolo Castaneda MD;  Location: [facility];  Service: Endoscopy;  Laterality: N/A;  5/1-lvm for precall-MS    ESOPHAGOGASTRODUODENOSCOPY N/A 5/7/2024    Procedure: EGD (ESOPHAGOGASTRODUODENOSCOPY);  Surgeon: Manolo Castaneda MD;  Location: [facility];  Service: Endoscopy;  Laterality: N/A;    WRIST SURGERY Right 1999    fell at a restaurant    WRIST SURGERY Left 1989    cyst removal      No family history on file.   Social History     Tobacco Use    Smoking status: Former    Smokeless tobacco: Never   Substance Use Topics    Alcohol use: Not Currently     Comment: severe alcohol abuse hx. stopped May 2023.      Review of Systems   Constitutional:  Negative for chills, fever and weight loss.   HENT:  Negative for congestion and sore throat.    Eyes:  Negative for blurred  vision.   Respiratory:  Negative for cough and shortness of breath.    Cardiovascular:  Negative for chest pain and palpitations.   Gastrointestinal:  Negative for constipation, diarrhea, nausea and vomiting.   Genitourinary:  Negative for dysuria and flank pain.   Neurological:  Negative for dizziness and headaches.      Objective:     Vitals:    01/03/25 1055   BP: (!) 142/84   Pulse: 91   SpO2: 99%   Weight: 84.9 kg (187 lb 2.7 oz)   Height: 6' (1.829 m)     Body mass index is 25.38 kg/m².    Physical Exam  Constitutional:       General: He is not in acute distress.     Appearance: Normal appearance. He is normal weight. He is not ill-appearing, toxic-appearing or diaphoretic.   Cardiovascular:      Rate and Rhythm: Normal rate and regular rhythm.      Heart sounds: Normal heart sounds. No murmur heard.     No friction rub. No gallop.   Pulmonary:      Effort: Pulmonary effort is normal. No respiratory distress.      Breath sounds: Normal breath sounds. No stridor. No wheezing or rhonchi.   Abdominal:      General: Abdomen is flat.      Palpations: Abdomen is soft.   Musculoskeletal:         General: No swelling or deformity.      Cervical back: Normal range of motion.      Right lower leg: No edema.      Left lower leg: No edema.      Comments: Using walker   Skin:     General: Skin is warm and dry.   Neurological:      General: No focal deficit present.      Mental Status: He is alert. Mental status is at baseline.            Assessment/Plan:     Dejuan Correa is a 79 y.o. year old male who presents to clinic for Pre-op Exam  .    Dejuan was seen today for pre-op exam.    Diagnoses and all orders for this visit:    Primary hypertension   - Elevated, 142/84 not improved on repeat. Recommended improved BP control but patient does not want to change medications at this time.    Hyperlipidemia, unspecified hyperlipidemia type  -     atorvastatin (LIPITOR) 40 MG tablet; Take 1 tablet (40 mg total) by mouth once  daily.    Preop examination  Pre-operative evaluation with risk assessment              -           Scheduled for L TKA on 02/04/25 by Dr. Rosario at [facility]   -           DASI score: 18.7 w/ Functional Capacity in METS: 5.04 (>4) with a revised cardiac risk index of 6%.    - he  is on antiplatelets/anticoagulation.   - he does not have a history of blood dyscrasias or previous reaction to anesthesia   - he is a FORMER SMOKER - quit 50 years ago, pack years unknown .  - he does have a prior h/o HTN. BP: 142/84.   - he does have a prior h/o HLD/CAD w/ either MI or CVA. See below for latest ASCVD if all necessary values are available. Had stroke about 2 years ago. Continue medical management w/ aspirin.  - he does not have a prior h/o DM.  See below for last A1C.  - he does not have a prior h/o thyroid disease. See below for last TSH.   - he does not have a prior h/o COPD/Asthma/CAREY/OHS. does not use CPAP. does not have changes from baseline function.   - he does have a prior h/o HF. Echo: EF 55% Grade I diastolic dysfunction.  - BMI: Body mass index is 25.38 kg/m².               -           The patient is medically optimized with a low to moderate risk to proceed with (per ACC/AHA kevin-operative guidelines) a moderate risk surgery.   - Please call our office for any additional questions or concerns.    Will need to hold Xarelto 2 days before surgery and aspirin 5 days before surgery. Patient understanding of risks pertaining to surgery and holding anticoagulation. Told patient that he will need to hold Xarelto, however did not mention the aspirin. Tried to call him to clarify but no answer.    The ASCVD Risk score (Darien MONTGOMERY, et al., 2019) failed to calculate for the following reasons:    Risk score cannot be calculated because patient has a medical history suggesting prior/existing ASCVD  Lab Results   Component Value Date    HGBA1C 5.2 06/06/2023      Lab Results   Component Value Date    TSH 2.920 06/04/2023        Advanced care planning  Patient has living will. Daughter is current MPOA, documentation is in EPIC. Patient interested in filling out [place] Post at next visit.    Follow-up: 2 months f/u HTN, LA Post form    A total of 30 minutes was spent on patient care during this encounter which included chart review, examining the patient, formulating a treatment plan and documentation.     Case discussed with staff: Dr. Terence Rivera MD  [facility], PGY-1  01/03/2025

## 2025-01-12 NOTE — PROGRESS NOTES
[facility] [address]     SUBJECTIVE:     History of Present Illness:  Patient is a 79 y.o. male presents to followup for atrial fibrillation diagnosed in 2021. Here for clearance for left knee surgery.     Primary Diagnosis:   Hypertension: positive  DM: none  Smoker: quit over 50 years ago  Family history of early CAD  Heart disease: atrial fibrillation diagnosed in 2021; no history of MI or heart failure    ROS  Since last visit 8/2023:   Wheel chair bound due to knee and arthritis   No chest pains  No shortness of breath; no PND or orthopnea  No syncope  No palpitatons  Activity: walks with cane or walker in the house; sometimes walks without cane. Walked to the clinic but had to stop due to knee issues   7. Blood pressures at home: 140's at times   Review of patient's allergies indicates:   Allergen Reactions    Heparin     Heparin analogues        Past Medical History:   Diagnosis Date    Atrial fibrillation     Hypertension     Irregular heart beat     Supraventricular tachycardia        Past Surgical History:   Procedure Laterality Date    COLONOSCOPY N/A 5/7/2024    Procedure: COLONOSCOPY;  Surgeon: Manolo Castaneda MD;  Location: [facility];  Service: Endoscopy;  Laterality: N/A;  5/1-[facility] for precall-MS    ESOPHAGOGASTRODUODENOSCOPY N/A 5/7/2024    Procedure: EGD (ESOPHAGOGASTRODUODENOSCOPY);  Surgeon: Manolo Castaneda MD;  Location: [facility];  Service: Endoscopy;  Laterality: N/A;    WRIST SURGERY Right 1999    fell at a restaurant    WRIST SURGERY Left 1989    cyst removal           Past Hospitalization:     6/23: slurred speech and left sided weakness; acute infarcts left cerebellar area;     Cardiac meds:  ASA 81 mg  Atorvastatin 40 mg  Carvedilol 3.125 mg BID  Xarelto 20 mg   Lotrel 10/20 daily       OBJECTIVE:     Vital Signs (Most Recent)  Vitals:    01/13/25 1023   BP: (!) 165/93   Pulse: 82   SpO2: 99%   Weight: 86.7 kg (191 lb 2.2 oz)   Height: 6' (1.829 m)           Physical  Exam:  In chair:   Neck: normal carotids, no bruits; normal JVP  Lungs :clear  Heart: RR, normal S1,S2, no murmurs, no gallops  Abd: not examined   Exts: normal DP and PT pulses bilaterally, normal radials; no edema noted           LABS  : GFR normal         Diagnostic Results:    1.EK, , : all sinus ; PVC; nonspecific T wave changes   1b. EK24: sinus; PAC; poor precordial R wave progression ; possible old ASMI: no change from 24 EKG  2.Echo: : EF normal; LAE, diastolic dysfunction   3. Stress test  4. cath    Chart review:      ASSESSMENT/PLAN:     Paroxysmal atrial fibrillation: in sinus now  Hypertension: controlled  Chronic diastolic dysfunction - compensated  Previous stroke and debilitated   5. Blood pressures can be better  6. No symptoms of angina or failure  7. At least 4 METS of activity  Plan: 1. Low to moderate risk for surgery  2. Hold xarelto for 2 to 3 days prior to surgery and resume  3.Return 6 months     Dewayne Tang MD

## 2025-01-13 ENCOUNTER — OFFICE VISIT (OUTPATIENT)
Dept: CARDIOLOGY | Facility: CLINIC | Age: 80
End: 2025-01-13
Payer: MEDICARE

## 2025-01-13 VITALS
BODY MASS INDEX: 25.89 KG/M2 | DIASTOLIC BLOOD PRESSURE: 93 MMHG | SYSTOLIC BLOOD PRESSURE: 165 MMHG | HEART RATE: 82 BPM | HEIGHT: 72 IN | WEIGHT: 191.13 LBS | OXYGEN SATURATION: 99 %

## 2025-01-13 DIAGNOSIS — I48.0 PAF (PAROXYSMAL ATRIAL FIBRILLATION): ICD-10-CM

## 2025-01-13 DIAGNOSIS — I63.9 CEREBELLAR STROKE: ICD-10-CM

## 2025-01-13 DIAGNOSIS — I10 HYPERTENSION, ESSENTIAL: Primary | ICD-10-CM

## 2025-01-13 DIAGNOSIS — Z01.818 PREOPERATIVE CLEARANCE: ICD-10-CM

## 2025-01-13 PROCEDURE — 99999 PR PBB SHADOW E&M-EST. PATIENT-LVL III: CPT | Mod: PBBFAC,,, | Performed by: INTERNAL MEDICINE

## 2025-01-13 PROCEDURE — 1125F AMNT PAIN NOTED PAIN PRSNT: CPT | Mod: CPTII,S$GLB,, | Performed by: INTERNAL MEDICINE

## 2025-01-13 PROCEDURE — 1160F RVW MEDS BY RX/DR IN RCRD: CPT | Mod: CPTII,S$GLB,, | Performed by: INTERNAL MEDICINE

## 2025-01-13 PROCEDURE — 99214 OFFICE O/P EST MOD 30 MIN: CPT | Mod: S$GLB,,, | Performed by: INTERNAL MEDICINE

## 2025-01-13 PROCEDURE — 1101F PT FALLS ASSESS-DOCD LE1/YR: CPT | Mod: CPTII,S$GLB,, | Performed by: INTERNAL MEDICINE

## 2025-01-13 PROCEDURE — 1159F MED LIST DOCD IN RCRD: CPT | Mod: CPTII,S$GLB,, | Performed by: INTERNAL MEDICINE

## 2025-01-13 PROCEDURE — 3080F DIAST BP >= 90 MM HG: CPT | Mod: CPTII,S$GLB,, | Performed by: INTERNAL MEDICINE

## 2025-01-13 PROCEDURE — 1157F ADVNC CARE PLAN IN RCRD: CPT | Mod: CPTII,S$GLB,, | Performed by: INTERNAL MEDICINE

## 2025-01-13 PROCEDURE — 3077F SYST BP >= 140 MM HG: CPT | Mod: CPTII,S$GLB,, | Performed by: INTERNAL MEDICINE

## 2025-01-13 PROCEDURE — 3288F FALL RISK ASSESSMENT DOCD: CPT | Mod: CPTII,S$GLB,, | Performed by: INTERNAL MEDICINE

## 2025-04-11 DIAGNOSIS — I48.0 PAF (PAROXYSMAL ATRIAL FIBRILLATION): ICD-10-CM

## 2025-04-14 DIAGNOSIS — I48.0 PAF (PAROXYSMAL ATRIAL FIBRILLATION): ICD-10-CM

## 2025-04-30 DIAGNOSIS — I48.0 PAF (PAROXYSMAL ATRIAL FIBRILLATION): ICD-10-CM

## 2025-04-30 DIAGNOSIS — I10 PRIMARY HYPERTENSION: ICD-10-CM

## 2025-04-30 RX ORDER — CARVEDILOL 3.12 MG/1
3.12 TABLET ORAL 2 TIMES DAILY WITH MEALS
Qty: 180 TABLET | Refills: 3 | Status: SHIPPED | OUTPATIENT
Start: 2025-04-30 | End: 2026-04-30

## 2025-05-20 ENCOUNTER — HOSPITAL ENCOUNTER (EMERGENCY)
Facility: HOSPITAL | Age: 80
Discharge: HOME | End: 2025-05-20
Attending: EMERGENCY MEDICINE
Payer: MEDICARE

## 2025-05-20 VITALS
HEART RATE: 74 BPM | OXYGEN SATURATION: 99 % | HEIGHT: 72 IN | BODY MASS INDEX: 25.86 KG/M2 | SYSTOLIC BLOOD PRESSURE: 198 MMHG | RESPIRATION RATE: 21 BRPM | WEIGHT: 190.94 LBS | TEMPERATURE: 98 F | DIASTOLIC BLOOD PRESSURE: 96 MMHG

## 2025-05-20 DIAGNOSIS — N30.01 ACUTE CYSTITIS WITH HEMATURIA: ICD-10-CM

## 2025-05-20 DIAGNOSIS — R31.0 HEMATURIA, GROSS: Primary | ICD-10-CM

## 2025-05-20 LAB
ABSOLUTE EOSINOPHIL (OHS): 0.16 K/UL
ABSOLUTE MONOCYTE (OHS): 0.4 K/UL (ref 0.3–1)
ABSOLUTE NEUTROPHIL COUNT (OHS): 3.8 K/UL (ref 1.8–7.7)
ALBUMIN SERPL BCP-MCNC: 3.8 G/DL (ref 3.5–5.2)
ALP SERPL-CCNC: 94 UNIT/L (ref 40–150)
ALT SERPL W/O P-5'-P-CCNC: <5 UNIT/L (ref 10–44)
ANION GAP (OHS): 9 MMOL/L (ref 8–16)
AST SERPL-CCNC: 11 UNIT/L (ref 11–45)
BASOPHILS # BLD AUTO: 0.05 K/UL
BASOPHILS NFR BLD AUTO: 0.9 %
BILIRUB SERPL-MCNC: 2.4 MG/DL (ref 0.1–1)
BILIRUB UR QL STRIP.AUTO: NEGATIVE
BUN SERPL-MCNC: 18 MG/DL (ref 8–23)
CALCIUM SERPL-MCNC: 9.3 MG/DL (ref 8.7–10.5)
CHLORIDE SERPL-SCNC: 105 MMOL/L (ref 95–110)
CLARITY UR: CLEAR
CO2 SERPL-SCNC: 28 MMOL/L (ref 23–29)
COLOR UR AUTO: YELLOW
CREAT SERPL-MCNC: 1.1 MG/DL (ref 0.5–1.4)
ERYTHROCYTE [DISTWIDTH] IN BLOOD BY AUTOMATED COUNT: 14.9 % (ref 11.5–14.5)
GFR SERPLBLD CREATININE-BSD FMLA CKD-EPI: >60 ML/MIN/1.73/M2
GLUCOSE SERPL-MCNC: 103 MG/DL (ref 70–110)
GLUCOSE UR QL STRIP: NEGATIVE
HCT VFR BLD AUTO: 37.5 % (ref 40–54)
HGB BLD-MCNC: 11.4 GM/DL (ref 14–18)
HGB UR QL STRIP: ABNORMAL
HOLD SPECIMEN: NORMAL
HYALINE CASTS UR QL AUTO: 1 /LPF (ref 0–1)
IMM GRANULOCYTES # BLD AUTO: 0 K/UL (ref 0–0.04)
IMM GRANULOCYTES NFR BLD AUTO: 0 % (ref 0–0.5)
KETONES UR QL STRIP: NEGATIVE
LEUKOCYTE ESTERASE UR QL STRIP: ABNORMAL
LYMPHOCYTES # BLD AUTO: 1.05 K/UL (ref 1–4.8)
MCH RBC QN AUTO: 25.2 PG (ref 27–31)
MCHC RBC AUTO-ENTMCNC: 30.4 G/DL (ref 32–36)
MCV RBC AUTO: 83 FL (ref 82–98)
MICROSCOPIC COMMENT: ABNORMAL
NITRITE UR QL STRIP: NEGATIVE
NUCLEATED RBC (/100WBC) (OHS): 0 /100 WBC
PH UR STRIP: 6 [PH]
PLATELET # BLD AUTO: 196 K/UL (ref 150–450)
PMV BLD AUTO: 10.9 FL (ref 9.2–12.9)
POTASSIUM SERPL-SCNC: 3.6 MMOL/L (ref 3.5–5.1)
PROT SERPL-MCNC: 7.5 GM/DL (ref 6–8.4)
PROT UR QL STRIP: ABNORMAL
RBC # BLD AUTO: 4.53 M/UL (ref 4.6–6.2)
RBC #/AREA URNS AUTO: >100 /HPF (ref 0–4)
RELATIVE EOSINOPHIL (OHS): 2.9 %
RELATIVE LYMPHOCYTE (OHS): 19.2 % (ref 18–48)
RELATIVE MONOCYTE (OHS): 7.3 % (ref 4–15)
RELATIVE NEUTROPHIL (OHS): 69.7 % (ref 38–73)
SODIUM SERPL-SCNC: 142 MMOL/L (ref 136–145)
SP GR UR STRIP: 1.01
SQUAMOUS #/AREA URNS AUTO: 1 /HPF
UROBILINOGEN UR STRIP-ACNC: NEGATIVE EU/DL
WBC # BLD AUTO: 5.46 K/UL (ref 3.9–12.7)
WBC #/AREA URNS AUTO: 55 /HPF (ref 0–5)

## 2025-05-20 PROCEDURE — 25000003 PHARM REV CODE 250: Performed by: EMERGENCY MEDICINE

## 2025-05-20 PROCEDURE — 81003 URINALYSIS AUTO W/O SCOPE: CPT | Performed by: EMERGENCY MEDICINE

## 2025-05-20 PROCEDURE — 51798 US URINE CAPACITY MEASURE: CPT

## 2025-05-20 PROCEDURE — 87086 URINE CULTURE/COLONY COUNT: CPT | Performed by: EMERGENCY MEDICINE

## 2025-05-20 PROCEDURE — 85025 COMPLETE CBC W/AUTO DIFF WBC: CPT | Performed by: EMERGENCY MEDICINE

## 2025-05-20 PROCEDURE — 99284 EMERGENCY DEPT VISIT MOD MDM: CPT | Mod: 25

## 2025-05-20 PROCEDURE — 63600175 PHARM REV CODE 636 W HCPCS: Performed by: EMERGENCY MEDICINE

## 2025-05-20 PROCEDURE — 96374 THER/PROPH/DIAG INJ IV PUSH: CPT

## 2025-05-20 PROCEDURE — 82247 BILIRUBIN TOTAL: CPT | Performed by: EMERGENCY MEDICINE

## 2025-05-20 RX ORDER — AMLODIPINE BESYLATE 5 MG/1
10 TABLET ORAL
Status: COMPLETED | OUTPATIENT
Start: 2025-05-20 | End: 2025-05-20

## 2025-05-20 RX ORDER — CARVEDILOL 3.12 MG/1
3.12 TABLET ORAL
Status: COMPLETED | OUTPATIENT
Start: 2025-05-20 | End: 2025-05-20

## 2025-05-20 RX ORDER — AMOXICILLIN AND CLAVULANATE POTASSIUM 875; 125 MG/1; MG/1
1 TABLET, FILM COATED ORAL 2 TIMES DAILY
Qty: 14 TABLET | Refills: 0 | Status: SHIPPED | OUTPATIENT
Start: 2025-05-20

## 2025-05-20 RX ORDER — CEFTRIAXONE 1 G/1
1 INJECTION, POWDER, FOR SOLUTION INTRAMUSCULAR; INTRAVENOUS
Status: COMPLETED | OUTPATIENT
Start: 2025-05-20 | End: 2025-05-20

## 2025-05-20 RX ADMIN — CEFTRIAXONE SODIUM 1 G: 1 INJECTION, POWDER, FOR SOLUTION INTRAMUSCULAR; INTRAVENOUS at 01:05

## 2025-05-20 RX ADMIN — CARVEDILOL 3.12 MG: 3.12 TABLET, FILM COATED ORAL at 03:05

## 2025-05-20 RX ADMIN — AMLODIPINE BESYLATE 10 MG: 5 TABLET ORAL at 03:05

## 2025-05-20 NOTE — ED NOTES
Pt brought in by [facility] from home. Pt reports that he has been taking medication he purchased online to increase the blood flow to his penis. Pt also states that he took 100 mg of viagra yesterday and participated in intercourse. Pt states he urinated at approximately 0330 today without any difficulty. Pt reports that when he urinated at approximately 0830 his urine was tanner blood. No reports of abdominal pain. Pt does take blood thinner. Pt ambulatory with cane.

## 2025-05-20 NOTE — ED NOTES
Pt stated that he has been out of BP medications and he will not be able to get some until tomorrow morning. MD Pham aware.

## 2025-05-20 NOTE — ED PROVIDER NOTES
Encounter Date: 5/20/2025       History     Chief Complaint   Patient presents with    Hematuria     The pt presents to the ED from home with a complaint of hematuria that started yesterday but acutely worse this morning.  Pt urinated tanner blood prior to arrival. Pt takes xeralto.      Patient is an 80-year-old male who presents emergency room with hematuria.  He has takes Xarelto.  He has a past medical history hypertension AFib.  He has no history of gross hematuria or BPH.  He denies being a long-term smoker.  No history of bladder cancer.  He denies any significant urinary hesitancy.  He feels that his urine has already started to clear up some.      Review of patient's allergies indicates:   Allergen Reactions    Heparin     Heparin analogues      Past Medical History:   Diagnosis Date    Atrial fibrillation     Hypertension     Irregular heart beat     Supraventricular tachycardia      Past Surgical History:   Procedure Laterality Date    COLONOSCOPY N/A 5/7/2024    Procedure: COLONOSCOPY;  Surgeon: Manolo Castaneda MD;  Location: [facility];  Service: Endoscopy;  Laterality: N/A;  5/1-lvm for precall-MS    ESOPHAGOGASTRODUODENOSCOPY N/A 5/7/2024    Procedure: EGD (ESOPHAGOGASTRODUODENOSCOPY);  Surgeon: Manolo Castaneda MD;  Location: [facility];  Service: Endoscopy;  Laterality: N/A;    WRIST SURGERY Right 1999    fell at a restaurant    WRIST SURGERY Left 1989    cyst removal     No family history on file.  Social History[1]  Review of Systems   Constitutional:  Negative for fever.   HENT:  Negative for ear pain, rhinorrhea and sore throat.    Eyes:  Negative for pain and visual disturbance.   Respiratory:  Negative for cough and shortness of breath.    Cardiovascular:  Negative for chest pain.   Gastrointestinal:  Negative for abdominal pain, diarrhea, nausea and vomiting.   Genitourinary:  Positive for hematuria. Negative for difficulty urinating.   Musculoskeletal:  Negative for  arthralgias.   Skin:  Negative for rash.   Neurological:  Negative for weakness, numbness and headaches.   All other systems reviewed and are negative.      Physical Exam     Initial Vitals [05/20/25 1019]   BP Pulse Resp Temp SpO2   (!) 173/101 94 20 99.3 °F (37.4 °C) 99 %      MAP       --         Physical Exam    Nursing note and vitals reviewed.  Constitutional: He appears well-developed and well-nourished. No distress.   HENT:   Head: Normocephalic and atraumatic. Mouth/Throat: Oropharynx is clear and moist.   Eyes: Conjunctivae and EOM are normal. Pupils are equal, round, and reactive to light.   Neck: Neck supple.   Normal range of motion.  Cardiovascular:  Normal rate, regular rhythm, normal heart sounds and intact distal pulses.     Exam reveals no gallop and no friction rub.       No murmur heard.  Pulmonary/Chest: Breath sounds normal. He has no wheezes. He has no rhonchi. He has no rales.   Abdominal: Abdomen is soft. Bowel sounds are normal. There is no abdominal tenderness. There is no rebound and no guarding.   Genitourinary:    Genitourinary Comments: Uncircumcised.  No testicular pain.  Urine is jessica colored     Musculoskeletal:         General: No edema. Normal range of motion.      Cervical back: Normal range of motion and neck supple.     Neurological: He is alert and oriented to person, place, and time. He has normal strength. No cranial nerve deficit or sensory deficit. GCS score is 15. GCS eye subscore is 4. GCS verbal subscore is 5. GCS motor subscore is 6.   Skin: Skin is warm and dry. No rash noted.   Psychiatric: He has a normal mood and affect.         ED Course   Procedures  Labs Reviewed   COMPREHENSIVE METABOLIC PANEL - Abnormal       Result Value    Sodium 142      Potassium 3.6      Chloride 105      CO2 28      Glucose 103      BUN 18      Creatinine 1.1      Calcium 9.3      Protein Total 7.5      Albumin 3.8      Bilirubin Total 2.4 (*)     ALP 94      AST 11      ALT <5 (*)      Anion Gap 9      eGFR >60     URINALYSIS, REFLEX TO URINE CULTURE - Abnormal    Color, UA Yellow      Appearance, UA Clear      pH, UA 6.0      Spec Grav UA 1.010      Protein, UA Trace (*)     Glucose, UA Negative      Ketones, UA Negative      Bilirubin, UA Negative      Blood, UA 3+ (*)     Nitrites, UA Negative      Urobilinogen, UA Negative      Leukocyte Esterase, UA 1+ (*)    CBC WITH DIFFERENTIAL - Abnormal    WBC 5.46      RBC 4.53 (*)     HGB 11.4 (*)     HCT 37.5 (*)     MCV 83      MCH 25.2 (*)     MCHC 30.4 (*)     RDW 14.9 (*)     Platelet Count 196      MPV 10.9      Nucleated RBC 0      Neut % 69.7      Lymph % 19.2      Mono % 7.3      Eos % 2.9      Basophil % 0.9      Imm Grans % 0.0      Neut # 3.80      Lymph # 1.05      Mono # 0.40      Eos # 0.16      Baso # 0.05      Imm Grans # 0.00     URINALYSIS MICROSCOPIC - Abnormal    RBC, UA >100 (*)     WBC, UA 55 (*)     Squamous Epithelial Cells, UA 1      Hyaline Casts, UA 1      Microscopic Comment       CULTURE, URINE   CBC W/ AUTO DIFFERENTIAL    Narrative:     The following orders were created for panel order CBC Auto Differential.  Procedure                               Abnormality         Status                     ---------                               -----------         ------                     CBC with Differential[[id number]]       Abnormal            Final result                 Please view results for these tests on the individual orders.   GREY TOP URINE HOLD          Imaging Results    None          Medications   cefTRIAXone injection 1 g (has no administration in time range)     Medical Decision Making  Patient takes Xarelto.  He had gross hematuria this morning but does not have incomplete emptying.  He appears have urinary tract infection I have given him a dose of Rocephin.  I will place him on antibiotics and refer to Urology.    Amount and/or Complexity of Data Reviewed  Labs: ordered.    Risk  Prescription drug  management.                                      Clinical Impression:  Final diagnoses:  [R31.0] Hematuria, gross (Primary)  [N30.01] Acute cystitis with hematuria          ED Disposition Condition    Discharge Stable          ED Prescriptions       Medication Sig Dispense Start Date End Date Auth. Provider    amoxicillin-clavulanate 875-125mg (AUGMENTIN) 875-125 mg per tablet Take 1 tablet by mouth 2 (two) times daily. 14 tablet 5/20/2025 -- Arpit Pham MD          Follow-up Information       Follow up With Specialties Details Why Contact Info    Humble Baxter MD Urology Schedule an appointment as soon as possible for a visit   [address]  [facility] [address]  [facility] [address]  [phone]                     [1]   Social History  Tobacco Use    Smoking status: Former    Smokeless tobacco: Never   Substance Use Topics    Alcohol use: Not Currently     Comment: severe alcohol abuse hx. stopped May 2023.    Drug use: Never        Arpit Pham MD  05/20/25 2922

## 2025-05-20 NOTE — DISCHARGE INSTRUCTIONS
Do not take your Xarelto until your urine has been yellow for 24 hours.  Return the ER if you are feeling chest pain lightheadedness shortness breath or unable to urinate and you feel like the urine is blocked.  I have referred you to a urologist.  Call the office today to schedule follow up appointment for the next few days.  Return the ER for worsening symptoms

## 2025-05-22 LAB — BACTERIA UR CULT: NORMAL

## 2025-06-13 ENCOUNTER — TELEPHONE (OUTPATIENT)
Dept: UROLOGY | Facility: CLINIC | Age: 80
End: 2025-06-13
Payer: MEDICARE

## 2025-06-13 NOTE — TELEPHONE ENCOUNTER
Called patient to consult and advise what was the reason for his visit. Patient was Referred from the Emergency Department. Patient was experiencing Hematuria. Patient is schedule for in patient visit with Dr. Smith on Monday.

## 2025-06-16 ENCOUNTER — OFFICE VISIT (OUTPATIENT)
Dept: UROLOGY | Facility: CLINIC | Age: 80
End: 2025-06-16
Payer: MEDICARE

## 2025-06-16 VITALS
DIASTOLIC BLOOD PRESSURE: 108 MMHG | HEIGHT: 72 IN | SYSTOLIC BLOOD PRESSURE: 171 MMHG | WEIGHT: 190.94 LBS | BODY MASS INDEX: 25.86 KG/M2 | HEART RATE: 86 BPM

## 2025-06-16 DIAGNOSIS — R31.0 GROSS HEMATURIA: Primary | ICD-10-CM

## 2025-06-16 DIAGNOSIS — N30.01 ACUTE CYSTITIS WITH HEMATURIA: ICD-10-CM

## 2025-06-16 PROCEDURE — 3288F FALL RISK ASSESSMENT DOCD: CPT | Mod: CPTII,S$GLB,, | Performed by: UROLOGY

## 2025-06-16 PROCEDURE — 99999 PR PBB SHADOW E&M-EST. PATIENT-LVL IV: CPT | Mod: PBBFAC,,, | Performed by: UROLOGY

## 2025-06-16 PROCEDURE — 1126F AMNT PAIN NOTED NONE PRSNT: CPT | Mod: CPTII,S$GLB,, | Performed by: UROLOGY

## 2025-06-16 PROCEDURE — 1160F RVW MEDS BY RX/DR IN RCRD: CPT | Mod: CPTII,S$GLB,, | Performed by: UROLOGY

## 2025-06-16 PROCEDURE — 1101F PT FALLS ASSESS-DOCD LE1/YR: CPT | Mod: CPTII,S$GLB,, | Performed by: UROLOGY

## 2025-06-16 PROCEDURE — G2211 COMPLEX E/M VISIT ADD ON: HCPCS | Mod: S$GLB,,, | Performed by: UROLOGY

## 2025-06-16 PROCEDURE — 1157F ADVNC CARE PLAN IN RCRD: CPT | Mod: CPTII,S$GLB,, | Performed by: UROLOGY

## 2025-06-16 PROCEDURE — 3077F SYST BP >= 140 MM HG: CPT | Mod: CPTII,S$GLB,, | Performed by: UROLOGY

## 2025-06-16 PROCEDURE — 99204 OFFICE O/P NEW MOD 45 MIN: CPT | Mod: S$GLB,,, | Performed by: UROLOGY

## 2025-06-16 PROCEDURE — 3080F DIAST BP >= 90 MM HG: CPT | Mod: CPTII,S$GLB,, | Performed by: UROLOGY

## 2025-06-16 PROCEDURE — 1159F MED LIST DOCD IN RCRD: CPT | Mod: CPTII,S$GLB,, | Performed by: UROLOGY

## 2025-06-16 NOTE — PROGRESS NOTES
[place] - Urology   Clinic Note    SUBJECTIVE:     Chief Complaint: Gross Hematuria    Referral from: Arpit Pham MD.    History of Present Illness:  Dejuan Correa is a 80 y.o. male who presents to clinic for evaluation of gross hematuria.    Here for evaluation of gross hematuria. Takes Xarelto. Hematuria has since resolved since ED visit. No recent UTIs. No personal or family history of urologic cancers. Endorses any previous smoking history. Denies a previous history of hematuria. He has not had a hematuria workup performed before.    Patient endorses no additional complaints at this time.    Past Medical History:   Diagnosis Date    Atrial fibrillation     Hypertension     Irregular heart beat     Supraventricular tachycardia        Past Surgical History:   Procedure Laterality Date    COLONOSCOPY N/A 5/7/2024    Procedure: COLONOSCOPY;  Surgeon: Manolo Castaneda MD;  Location: [facility];  Service: Endoscopy;  Laterality: N/A;  5/1-lv for precall-MS    ESOPHAGOGASTRODUODENOSCOPY N/A 5/7/2024    Procedure: EGD (ESOPHAGOGASTRODUODENOSCOPY);  Surgeon: Manolo Castaneda MD;  Location: [facility];  Service: Endoscopy;  Laterality: N/A;    WRIST SURGERY Right 1999    fell at a restaurant    WRIST SURGERY Left 1989    cyst removal       No family history on file.    Social History[1]    Medications Ordered Prior to Encounter[2]    Review of patient's allergies indicates:   Allergen Reactions    Heparin     Heparin analogues        Review of Systems:  A review of 10+ systems was conducted with pertinent positive and negative findings documented in HPI with all other systems reviewed and negative.    OBJECTIVE:     Estimated body mass index is 25.89 kg/m² as calculated from the following:    Height as of 5/20/25: 6' (1.829 m).    Weight as of 5/20/25: 86.6 kg (190 lb 14.7 oz).    Vital Signs (Most Recent)  There were no vitals filed for this visit.    Physical Exam:  GENERAL: patient sitting  "comfortably  HEENT: normocephalic  NECK: supple, no JVD  PULM: normal chest rise, no increased WOB  HEART: non-diaphoretic  ABDO: soft, nondistended, nontender  BACK: no CVA tenderness bilaterally  SKIN: warm, dry, well perfused  EXT: no bruising or edema  NEURO: grossly normal with no focal deficits  PSYCH: appropriate mood and affect    Genitourinary Exam:  deferred    Lab Results   Component Value Date    BUN 18 05/20/2025    CREATININE 1.1 05/20/2025    WBC 5.46 05/20/2025    HGB 11.4 (L) 05/20/2025    HCT 37.5 (L) 05/20/2025     05/20/2025    AST 11 05/20/2025    ALT <5 (L) 05/20/2025    ALKPHOS 94 05/20/2025    ALBUMIN 3.8 05/20/2025    HGBA1C 5.2 06/06/2023        PSA:  No results found for: "PSA", "PSADIAG", "PSATOTAL", "PSAFREE"    Imaging:  All relevant imaging studies have been reviewed personally by me.    No results found for this or any previous visit (from the past 2160 hours).  No results found for this or any previous visit (from the past 2160 hours).      ASSESSMENT     1. Gross hematuria        PLAN:     Gross Hematuria    - He has gross hematuria.    I counseled the patient on the AUA Guidelines for a hematuria workup. An evaluation is recommend on all patients with gross hematuria. The goal of upper tract imaging in patients is to identify malignancies of the renal parenchyma and upper tract urothelium, as well as to identify actionable non-malignant diagnoses of the kidney, collecting system, and ureters. The choice of imaging modality involves tradeoffs between diagnostic accuracy versus risk. The evaluation also involves a flexible cystoscopy performed in the clinic. A urine cytology may also be sent at the time of cystoscopy.    - Plan for flexible cystoscopy, urine cytology, and CT urogram.     - Visit today included increased complexity associated with the ongoing care of the episodic problem gross hematuria which has been addressed and requires the longitudinal care of the " patient due to the serious and/or complex managed problem of gross hematuria.     Humble Baxter MD  Urology  [facility] [place]    Disclaimer: This note has been generated using voice-recognition software. There may be typographical errors that have been missed during proof-reading.          [1]   Social History  Tobacco Use    Smoking status: Former    Smokeless tobacco: Never   Substance Use Topics    Alcohol use: Not Currently     Comment: severe alcohol abuse hx. stopped May 2023.    Drug use: Never   [2]   Current Outpatient Medications on File Prior to Visit   Medication Sig Dispense Refill    acetaminophen (TYLENOL) 325 MG tablet Take 650 mg by mouth every 6 (six) hours as needed.      allopurinoL (ZYLOPRIM) 100 MG tablet Take 1 tablet (100 mg total) by mouth once daily. 30 tablet 11    amlodipine-benazepril 10-20mg (LOTREL) 10-20 mg per capsule Take 1 capsule by mouth once daily. 90 capsule 3    amoxicillin-clavulanate 875-125mg (AUGMENTIN) 875-125 mg per tablet Take 1 tablet by mouth 2 (two) times daily. 14 tablet 0    aspirin (ECOTRIN) 81 MG EC tablet Take 1 tablet (81 mg total) by mouth once daily. 90 tablet 3    atorvastatin (LIPITOR) 40 MG tablet Take 1 tablet (40 mg total) by mouth once daily. 90 tablet 3    carvediloL (COREG) 3.125 MG tablet Take 1 tablet (3.125 mg total) by mouth 2 (two) times daily with meals. 180 tablet 3    colchicine, gout, (COLCRYS) 0.6 mg tablet Take 1 tablet (0.6 mg total) by mouth 2 (two) times daily as needed (Gout attack). Take at symptom onset for 4 days or until symptoms improve (Patient not taking: Reported on 1/13/2025) 20 tablet 0    docusate sodium (COLACE) 100 MG capsule Take 100 mg by mouth. (Patient not taking: Reported on 1/13/2025)      folic acid (FOLVITE) 1 MG tablet Take 1 tablet (1 mg total) by mouth once daily. 30 tablet 11    rivaroxaban (XARELTO) 20 mg Tab Take 1 tablet (20 mg total) by mouth Daily. 90 tablet 1    sodium,potassium,mag  sulfates (SUPREP BOWEL PREP KIT) 17.5-3.13-1.6 gram SolR Take 177 mLs by mouth once daily. 1 kit 0    tadalafiL (CIALIS) 5 MG tablet Take 1 tablet (5 mg total) by mouth daily as needed for Erectile Dysfunction. 30 tablet 0     No current facility-administered medications on file prior to visit.

## 2025-06-19 ENCOUNTER — LAB VISIT (OUTPATIENT)
Dept: LAB | Facility: HOSPITAL | Age: 80
End: 2025-06-19
Attending: UROLOGY
Payer: MEDICARE

## 2025-06-19 DIAGNOSIS — R31.0 GROSS HEMATURIA: ICD-10-CM

## 2025-06-19 LAB
CREAT SERPL-MCNC: 0.9 MG/DL (ref 0.5–1.4)
GFR SERPLBLD CREATININE-BSD FMLA CKD-EPI: >60 ML/MIN/1.73/M2

## 2025-06-19 PROCEDURE — 82565 ASSAY OF CREATININE: CPT | Mod: PN

## 2025-06-19 PROCEDURE — 36415 COLL VENOUS BLD VENIPUNCTURE: CPT | Mod: PN

## 2025-06-23 ENCOUNTER — HOSPITAL ENCOUNTER (OUTPATIENT)
Dept: RADIOLOGY | Facility: HOSPITAL | Age: 80
Discharge: HOME | End: 2025-06-23
Attending: UROLOGY
Payer: MEDICARE

## 2025-06-23 DIAGNOSIS — R31.0 GROSS HEMATURIA: ICD-10-CM

## 2025-06-23 PROCEDURE — 74178 CT ABD&PLV WO CNTR FLWD CNTR: CPT | Mod: TC,PN

## 2025-06-23 PROCEDURE — 74178 CT ABD&PLV WO CNTR FLWD CNTR: CPT | Mod: 26,,, | Performed by: STUDENT IN AN ORGANIZED HEALTH CARE EDUCATION/TRAINING PROGRAM

## 2025-06-23 PROCEDURE — 25500020 PHARM REV CODE 255: Mod: PN | Performed by: UROLOGY

## 2025-06-23 RX ADMIN — IOHEXOL 100 ML: 350 INJECTION, SOLUTION INTRAVENOUS at 08:06

## 2025-07-10 ENCOUNTER — PROCEDURE VISIT (OUTPATIENT)
Dept: UROLOGY | Facility: CLINIC | Age: 80
End: 2025-07-10
Payer: MEDICARE

## 2025-07-10 VITALS
DIASTOLIC BLOOD PRESSURE: 106 MMHG | SYSTOLIC BLOOD PRESSURE: 176 MMHG | HEART RATE: 94 BPM | BODY MASS INDEX: 23.4 KG/M2 | WEIGHT: 172.75 LBS | HEIGHT: 72 IN

## 2025-07-10 DIAGNOSIS — N28.1 RENAL CYST: Primary | ICD-10-CM

## 2025-07-10 DIAGNOSIS — R31.0 GROSS HEMATURIA: ICD-10-CM

## 2025-07-10 PROCEDURE — 52000 CYSTOURETHROSCOPY: CPT | Mod: S$GLB,,, | Performed by: UROLOGY

## 2025-07-10 PROCEDURE — 99499 UNLISTED E&M SERVICE: CPT | Mod: S$GLB,,, | Performed by: UROLOGY

## 2025-07-10 NOTE — PROCEDURES
Cystoscopy Procedure Note    Procedure:   Flexible cysto-uretheroscopy    Pre Procedure Diagnosis:  gross hematuria    Post Procedure Diagnosis:  gross hematuria    Surgeon: Humble Baxter MD     Anesthesia: 2% uro-jet lidocaine jelly for local analgesia    Procedure note:  A flexible cysto-urethroscopy was performed after consent was obtained. Prior to procedure a timeout was performed and the correct patient, procedure, and site was verified  The risks and benefits were explained. 2% lidocaine urojet was used for local analgesia. The genitalia was prepped and draped in the sterile fashion.     The flexible scope was advanced into the urethra and into the bladder. There were no urethral strictures noted throughout the course of the urethra. during scope passage.     The bladder was completely surveyed in a systematic fashion. The bilateral ureteral orifices were identified in their normal orthotopic locations with efflux noted bilaterally. The remained of the bladder was evaluated. No bladder tumors or lesions suspicious for malignancy were seen.     Upon retroflexion there was no significant median lobe enlargement. As the flexible cystoscope was being withdrawn, the prostate was evaluated carefully. The lateral lobes of the prostate were not significantly enlarged. A bladder barbotage was obtained and a urine specimen for cytology was sent.    The patient tolerated the procedure well without complication.     Plan:  - Renal cysts appear unchanged  - Follow up cytology  - Hematuria workup otherwise negative. Can see me back PRN    Humble Baxter MD  Urology  [facility] [place]